# Patient Record
Sex: FEMALE | Race: WHITE
[De-identification: names, ages, dates, MRNs, and addresses within clinical notes are randomized per-mention and may not be internally consistent; named-entity substitution may affect disease eponyms.]

---

## 2021-01-06 ENCOUNTER — HOSPITAL ENCOUNTER (OUTPATIENT)
Dept: HOSPITAL 8 - CFH | Age: 31
Discharge: HOME | End: 2021-01-06
Payer: COMMERCIAL

## 2021-01-06 DIAGNOSIS — N20.0: Primary | ICD-10-CM

## 2021-01-06 DIAGNOSIS — N23: ICD-10-CM

## 2021-01-06 PROCEDURE — 74178 CT ABD&PLV WO CNTR FLWD CNTR: CPT

## 2022-01-05 ENCOUNTER — TELEPHONE (OUTPATIENT)
Dept: SCHEDULING | Facility: IMAGING CENTER | Age: 32
End: 2022-01-05

## 2022-02-07 ENCOUNTER — TELEPHONE (OUTPATIENT)
Dept: SCHEDULING | Facility: IMAGING CENTER | Age: 32
End: 2022-02-07

## 2023-07-19 ENCOUNTER — HOSPITAL ENCOUNTER (OUTPATIENT)
Dept: LAB | Facility: MEDICAL CENTER | Age: 33
End: 2023-07-19
Attending: UROLOGY
Payer: COMMERCIAL

## 2023-07-19 LAB
ALBUMIN SERPL BCP-MCNC: 4.2 G/DL (ref 3.2–4.9)
ALBUMIN/GLOB SERPL: 1.2 G/DL
ALP SERPL-CCNC: 98 U/L (ref 30–99)
ALT SERPL-CCNC: 22 U/L (ref 2–50)
ANION GAP SERPL CALC-SCNC: 12 MMOL/L (ref 7–16)
AST SERPL-CCNC: 22 U/L (ref 12–45)
BILIRUB SERPL-MCNC: 0.2 MG/DL (ref 0.1–1.5)
BUN SERPL-MCNC: 11 MG/DL (ref 8–22)
CALCIUM ALBUM COR SERPL-MCNC: 8.8 MG/DL (ref 8.5–10.5)
CALCIUM SERPL-MCNC: 9 MG/DL (ref 8.5–10.5)
CHLORIDE SERPL-SCNC: 104 MMOL/L (ref 96–112)
CO2 SERPL-SCNC: 22 MMOL/L (ref 20–33)
CREAT SERPL-MCNC: 0.67 MG/DL (ref 0.5–1.4)
GFR SERPLBLD CREATININE-BSD FMLA CKD-EPI: 118 ML/MIN/1.73 M 2
GLOBULIN SER CALC-MCNC: 3.6 G/DL (ref 1.9–3.5)
GLUCOSE SERPL-MCNC: 81 MG/DL (ref 65–99)
POTASSIUM SERPL-SCNC: 4.2 MMOL/L (ref 3.6–5.5)
PROT SERPL-MCNC: 7.8 G/DL (ref 6–8.2)
PTH-INTACT SERPL-MCNC: 47.3 PG/ML (ref 14–72)
SODIUM SERPL-SCNC: 138 MMOL/L (ref 135–145)
URATE SERPL-MCNC: 3.5 MG/DL (ref 1.9–8.2)

## 2023-07-19 PROCEDURE — 80053 COMPREHEN METABOLIC PANEL: CPT

## 2023-07-19 PROCEDURE — 36415 COLL VENOUS BLD VENIPUNCTURE: CPT

## 2023-07-19 PROCEDURE — 84550 ASSAY OF BLOOD/URIC ACID: CPT

## 2023-07-19 PROCEDURE — 83970 ASSAY OF PARATHORMONE: CPT

## 2023-08-17 ENCOUNTER — APPOINTMENT (OUTPATIENT)
Dept: URGENT CARE | Facility: CLINIC | Age: 33
End: 2023-08-17
Payer: COMMERCIAL

## 2023-08-18 ENCOUNTER — HOSPITAL ENCOUNTER (EMERGENCY)
Facility: MEDICAL CENTER | Age: 33
End: 2023-08-18
Attending: STUDENT IN AN ORGANIZED HEALTH CARE EDUCATION/TRAINING PROGRAM
Payer: COMMERCIAL

## 2023-08-18 VITALS
WEIGHT: 174.16 LBS | TEMPERATURE: 97.8 F | BODY MASS INDEX: 32.88 KG/M2 | HEART RATE: 85 BPM | SYSTOLIC BLOOD PRESSURE: 145 MMHG | DIASTOLIC BLOOD PRESSURE: 65 MMHG | RESPIRATION RATE: 18 BRPM | HEIGHT: 61 IN | OXYGEN SATURATION: 97 %

## 2023-08-18 DIAGNOSIS — R10.9 FLANK PAIN: ICD-10-CM

## 2023-08-18 LAB
ALBUMIN SERPL BCP-MCNC: 3.9 G/DL (ref 3.2–4.9)
ALBUMIN/GLOB SERPL: 1.2 G/DL
ALP SERPL-CCNC: 98 U/L (ref 30–99)
ALT SERPL-CCNC: 39 U/L (ref 2–50)
ANION GAP SERPL CALC-SCNC: 12 MMOL/L (ref 7–16)
APPEARANCE UR: CLEAR
AST SERPL-CCNC: 27 U/L (ref 12–45)
BACTERIA #/AREA URNS HPF: NEGATIVE /HPF
BASOPHILS # BLD AUTO: 0.5 % (ref 0–1.8)
BASOPHILS # BLD: 0.03 K/UL (ref 0–0.12)
BILIRUB SERPL-MCNC: 0.3 MG/DL (ref 0.1–1.5)
BILIRUB UR QL STRIP.AUTO: NEGATIVE
BUN SERPL-MCNC: 8 MG/DL (ref 8–22)
CALCIUM ALBUM COR SERPL-MCNC: 9.1 MG/DL (ref 8.5–10.5)
CALCIUM SERPL-MCNC: 9 MG/DL (ref 8.5–10.5)
CHLORIDE SERPL-SCNC: 105 MMOL/L (ref 96–112)
CO2 SERPL-SCNC: 19 MMOL/L (ref 20–33)
COLOR UR: YELLOW
CREAT SERPL-MCNC: 0.52 MG/DL (ref 0.5–1.4)
EOSINOPHIL # BLD AUTO: 0.15 K/UL (ref 0–0.51)
EOSINOPHIL NFR BLD: 2.6 % (ref 0–6.9)
EPI CELLS #/AREA URNS HPF: ABNORMAL /HPF
ERYTHROCYTE [DISTWIDTH] IN BLOOD BY AUTOMATED COUNT: 38.8 FL (ref 35.9–50)
GFR SERPLBLD CREATININE-BSD FMLA CKD-EPI: 126 ML/MIN/1.73 M 2
GLOBULIN SER CALC-MCNC: 3.3 G/DL (ref 1.9–3.5)
GLUCOSE SERPL-MCNC: 125 MG/DL (ref 65–99)
GLUCOSE UR STRIP.AUTO-MCNC: NEGATIVE MG/DL
HCG SERPL QL: NEGATIVE
HCT VFR BLD AUTO: 43.2 % (ref 37–47)
HGB BLD-MCNC: 14.4 G/DL (ref 12–16)
HYALINE CASTS #/AREA URNS LPF: ABNORMAL /LPF
IMM GRANULOCYTES # BLD AUTO: 0.01 K/UL (ref 0–0.11)
IMM GRANULOCYTES NFR BLD AUTO: 0.2 % (ref 0–0.9)
KETONES UR STRIP.AUTO-MCNC: 15 MG/DL
LEUKOCYTE ESTERASE UR QL STRIP.AUTO: ABNORMAL
LIPASE SERPL-CCNC: 30 U/L (ref 11–82)
LYMPHOCYTES # BLD AUTO: 1.63 K/UL (ref 1–4.8)
LYMPHOCYTES NFR BLD: 28.3 % (ref 22–41)
MCH RBC QN AUTO: 28.3 PG (ref 27–33)
MCHC RBC AUTO-ENTMCNC: 33.3 G/DL (ref 32.2–35.5)
MCV RBC AUTO: 85 FL (ref 81.4–97.8)
MICRO URNS: ABNORMAL
MONOCYTES # BLD AUTO: 0.63 K/UL (ref 0–0.85)
MONOCYTES NFR BLD AUTO: 10.9 % (ref 0–13.4)
NEUTROPHILS # BLD AUTO: 3.31 K/UL (ref 1.82–7.42)
NEUTROPHILS NFR BLD: 57.5 % (ref 44–72)
NITRITE UR QL STRIP.AUTO: NEGATIVE
NRBC # BLD AUTO: 0 K/UL
NRBC BLD-RTO: 0 /100 WBC (ref 0–0.2)
PH UR STRIP.AUTO: 5.5 [PH] (ref 5–8)
PLATELET # BLD AUTO: 283 K/UL (ref 164–446)
PMV BLD AUTO: 10 FL (ref 9–12.9)
POTASSIUM SERPL-SCNC: 3.8 MMOL/L (ref 3.6–5.5)
PROT SERPL-MCNC: 7.2 G/DL (ref 6–8.2)
PROT UR QL STRIP: NEGATIVE MG/DL
RBC # BLD AUTO: 5.08 M/UL (ref 4.2–5.4)
RBC # URNS HPF: ABNORMAL /HPF
RBC UR QL AUTO: ABNORMAL
SODIUM SERPL-SCNC: 136 MMOL/L (ref 135–145)
SP GR UR STRIP.AUTO: 1.01
UROBILINOGEN UR STRIP.AUTO-MCNC: 0.2 MG/DL
WBC # BLD AUTO: 5.8 K/UL (ref 4.8–10.8)
WBC #/AREA URNS HPF: ABNORMAL /HPF

## 2023-08-18 PROCEDURE — 80053 COMPREHEN METABOLIC PANEL: CPT

## 2023-08-18 PROCEDURE — 85025 COMPLETE CBC W/AUTO DIFF WBC: CPT

## 2023-08-18 PROCEDURE — 96374 THER/PROPH/DIAG INJ IV PUSH: CPT

## 2023-08-18 PROCEDURE — 84703 CHORIONIC GONADOTROPIN ASSAY: CPT

## 2023-08-18 PROCEDURE — 81001 URINALYSIS AUTO W/SCOPE: CPT

## 2023-08-18 PROCEDURE — 36415 COLL VENOUS BLD VENIPUNCTURE: CPT

## 2023-08-18 PROCEDURE — 700111 HCHG RX REV CODE 636 W/ 250 OVERRIDE (IP): Performed by: STUDENT IN AN ORGANIZED HEALTH CARE EDUCATION/TRAINING PROGRAM

## 2023-08-18 PROCEDURE — 83690 ASSAY OF LIPASE: CPT

## 2023-08-18 PROCEDURE — 99284 EMERGENCY DEPT VISIT MOD MDM: CPT

## 2023-08-18 PROCEDURE — 700105 HCHG RX REV CODE 258: Mod: JZ | Performed by: STUDENT IN AN ORGANIZED HEALTH CARE EDUCATION/TRAINING PROGRAM

## 2023-08-18 RX ORDER — SODIUM CHLORIDE, SODIUM LACTATE, POTASSIUM CHLORIDE, CALCIUM CHLORIDE 600; 310; 30; 20 MG/100ML; MG/100ML; MG/100ML; MG/100ML
1000 INJECTION, SOLUTION INTRAVENOUS ONCE
Status: COMPLETED | OUTPATIENT
Start: 2023-08-18 | End: 2023-08-18

## 2023-08-18 RX ORDER — IBUPROFEN 600 MG/1
600 TABLET ORAL EVERY 6 HOURS PRN
Qty: 30 TABLET | Refills: 0 | Status: SHIPPED
Start: 2023-08-18 | End: 2023-08-23

## 2023-08-18 RX ORDER — KETOROLAC TROMETHAMINE 30 MG/ML
15 INJECTION, SOLUTION INTRAMUSCULAR; INTRAVENOUS ONCE
Status: COMPLETED | OUTPATIENT
Start: 2023-08-18 | End: 2023-08-18

## 2023-08-18 RX ORDER — ONDANSETRON 4 MG/1
4 TABLET, ORALLY DISINTEGRATING ORAL EVERY 6 HOURS PRN
Qty: 10 TABLET | Refills: 0 | Status: SHIPPED | OUTPATIENT
Start: 2023-08-18 | End: 2023-08-23

## 2023-08-18 RX ORDER — TAMSULOSIN HYDROCHLORIDE 0.4 MG/1
0.4 CAPSULE ORAL DAILY
Qty: 14 CAPSULE | Refills: 0 | Status: SHIPPED | OUTPATIENT
Start: 2023-08-18 | End: 2023-08-30

## 2023-08-18 RX ADMIN — KETOROLAC TROMETHAMINE 15 MG: 30 INJECTION, SOLUTION INTRAMUSCULAR; INTRAVENOUS at 22:25

## 2023-08-18 RX ADMIN — SODIUM CHLORIDE, POTASSIUM CHLORIDE, SODIUM LACTATE AND CALCIUM CHLORIDE 1000 ML: 600; 310; 30; 20 INJECTION, SOLUTION INTRAVENOUS at 20:17

## 2023-08-19 NOTE — ED TRIAGE NOTES
"Chief Complaint   Patient presents with    Flank Pain     Started around 19:00. Pt has hx of kidney stones and had CT scan done  on Tuesday that showed 6 mm kidney stone. Pt feels like she may have passed it now.          Pt ambulated to triage for above complaint.  Pt is AO x 4, follows commands, and responds appropriately to questions. Patient's breathing is unlabored and pain is currently  0/10 on the 0-10 pain scale.  Pt placed in lobby. Patient educated on triage process and encouraged to alert staff for any changes.     BP (!) 172/104   Pulse 95   Temp 36.4 °C (97.5 °F) (Temporal)   Resp 16   Ht 1.549 m (5' 1\")   Wt 79 kg (174 lb 2.6 oz)   SpO2 97%     "

## 2023-08-19 NOTE — DISCHARGE INSTRUCTIONS
You were seen in the emergency department for flank pain.  Your labs show that your kidney function is normal.  Your urine is not consistent with urinary tract infection.  Please continue to use urine strainer to see if you pass kidney stone.  Please use Tylenol and Motrin at home to control your pain.  Please take Flomax to help with ureteral dilation to help you pass the stone.  Follow-up with your urologist for definitive management of the stone.  Return to the ER for uncontrolled pain, fever or any other concerns.  Follow-up with your doctor regarding routine health maintenance such as diabetes testing.

## 2023-08-19 NOTE — ED PROVIDER NOTES
ED Provider Note    CHIEF COMPLAINT  Chief Complaint   Patient presents with    Flank Pain     Started around 19:00. Pt has hx of kidney stones and had CT scan done  on Tuesday that showed 6 mm kidney stone. Pt feels like she may have passed it now.        EXTERNAL RECORDS REVIEWED  Other patient had CT scan of abdomen without contrast performed on 8/09/2023 which showed 5 mm calculus in the right kidney and 3 mm calculus in the left kidney with bilateral ureters and urinary bladder unremarkable    HPI/ROS  LIMITATION TO HISTORY   Select: : None  OUTSIDE HISTORIAN(S):  None    Deisy Gilbert is a 32 y.o. female with history of nephrolithiasis s/p ureteroscopy in 2017 who presents with right flank pain that started this evening.  States that pain was severe and sharp and feels like she was passing a kidney stone. Pain has now totally resolved spontaneously. She states that she had this pain earlier this week and was seen at outside ER where CT scan was done.  They were unable to get CT results but her urologist called her a few days ago and told her that she had a 6 mm calculus.  She is scheduled to see urology soon.  She took Tylenol for the pain last week but has not taken anything today.  She is not given any pain medications at ER visit earlier this week.  She also notes that she is getting over a stomach bug and states that the diarrhea that she previously had is getting better.  She is not vomiting.  She has no fever, abdominal pain, dysuria, hematuria.  She does feel some pain in the right flank when she tries to urinate but this is only present when she tries to urinate.  She otherwise has no chronic medical problems.    PAST MEDICAL HISTORY   She has history of nephrolithiasis    SURGICAL HISTORY  patient denies any surgical history    FAMILY HISTORY  History reviewed. No pertinent family history.    SOCIAL HISTORY  Social History     Tobacco Use    Smoking status: Never    Smokeless tobacco: Never  "  Substance and Sexual Activity    Alcohol use: Not Currently    Drug use: Not Currently    Sexual activity: Not on file       CURRENT MEDICATIONS  Home Medications       Reviewed by Kimberly Blancas R.N. (Registered Nurse) on 08/18/23 at 1931  Med List Status: Partial     Medication Last Dose Status        Patient Denny Taking any Medications                           ALLERGIES  No Known Allergies    PHYSICAL EXAM  VITAL SIGNS: BP (!) 172/104   Pulse 95   Temp 36.4 °C (97.5 °F) (Temporal)   Resp 16   Ht 1.549 m (5' 1\")   Wt 79 kg (174 lb 2.6 oz)   LMP 07/31/2023   SpO2 97%   BMI 32.91 kg/m²      Constitutional: Well developed, Well nourished, No acute distress, Non-toxic appearance.   HEENT: Normocephalic, Atraumatic,  external ears normal, pharynx pink,  Mucous  Membranes moist, No rhinorrhea or mucosal edema  Eyes: PERRL, EOMI, Conjunctiva normal, No discharge.   Neck: Normal range of motion, No tenderness, Supple, No stridor.   Cardiovascular: Regular Rate and Rhythm, No murmurs,  rubs, or gallops.   Thorax & Lungs: Lungs clear to auscultation bilaterally, No respiratory distress, No wheezes, rhales or rhonchi, No chest wall tenderness.   Abdomen: Bowel sounds normal, Soft, non tender, non distended,  No pulsatile masses., no rebound guarding or peritoneal signs.   Skin: Warm, Dry, No erythema, No rash,   Back:  No CVA tenderness,  No spinal tenderness, bony crepitance step offs or instability.   Extremities: Equal, intact distal pulses, No cyanosis, clubbing or edema,  No tenderness.   Musculoskeletal: Good range of motion in all major joints. No tenderness to palpation or major deformities noted.   Neurologic: Alert & oriented x 3,   No focal deficits noted.   Psychiatric: Affect normal, Judgment normal, Mood normal. No suicidal or homicidal ideation    DIAGNOSTIC STUDIES / PROCEDURES    LABS  Results for orders placed or performed during the hospital encounter of 08/18/23   CBC WITH DIFFERENTIAL "   Result Value Ref Range    WBC 5.8 4.8 - 10.8 K/uL    RBC 5.08 4.20 - 5.40 M/uL    Hemoglobin 14.4 12.0 - 16.0 g/dL    Hematocrit 43.2 37.0 - 47.0 %    MCV 85.0 81.4 - 97.8 fL    MCH 28.3 27.0 - 33.0 pg    MCHC 33.3 32.2 - 35.5 g/dL    RDW 38.8 35.9 - 50.0 fL    Platelet Count 283 164 - 446 K/uL    MPV 10.0 9.0 - 12.9 fL    Neutrophils-Polys 57.50 44.00 - 72.00 %    Lymphocytes 28.30 22.00 - 41.00 %    Monocytes 10.90 0.00 - 13.40 %    Eosinophils 2.60 0.00 - 6.90 %    Basophils 0.50 0.00 - 1.80 %    Immature Granulocytes 0.20 0.00 - 0.90 %    Nucleated RBC 0.00 0.00 - 0.20 /100 WBC    Neutrophils (Absolute) 3.31 1.82 - 7.42 K/uL    Lymphs (Absolute) 1.63 1.00 - 4.80 K/uL    Monos (Absolute) 0.63 0.00 - 0.85 K/uL    Eos (Absolute) 0.15 0.00 - 0.51 K/uL    Baso (Absolute) 0.03 0.00 - 0.12 K/uL    Immature Granulocytes (abs) 0.01 0.00 - 0.11 K/uL    NRBC (Absolute) 0.00 K/uL   COMP METABOLIC PANEL   Result Value Ref Range    Sodium 136 135 - 145 mmol/L    Potassium 3.8 3.6 - 5.5 mmol/L    Chloride 105 96 - 112 mmol/L    Co2 19 (L) 20 - 33 mmol/L    Anion Gap 12.0 7.0 - 16.0    Glucose 125 (H) 65 - 99 mg/dL    Bun 8 8 - 22 mg/dL    Creatinine 0.52 0.50 - 1.40 mg/dL    Calcium 9.0 8.5 - 10.5 mg/dL    Correct Calcium 9.1 8.5 - 10.5 mg/dL    AST(SGOT) 27 12 - 45 U/L    ALT(SGPT) 39 2 - 50 U/L    Alkaline Phosphatase 98 30 - 99 U/L    Total Bilirubin 0.3 0.1 - 1.5 mg/dL    Albumin 3.9 3.2 - 4.9 g/dL    Total Protein 7.2 6.0 - 8.2 g/dL    Globulin 3.3 1.9 - 3.5 g/dL    A-G Ratio 1.2 g/dL   LIPASE   Result Value Ref Range    Lipase 30 11 - 82 U/L   HCG QUAL SERUM   Result Value Ref Range    Beta-Hcg Qualitative Serum Negative Negative   URINALYSIS,CULTURE IF INDICATED    Specimen: Urine   Result Value Ref Range    Color Yellow     Character Clear     Specific Gravity 1.011 <1.035    Ph 5.5 5.0 - 8.0    Glucose Negative Negative mg/dL    Ketones 15 (A) Negative mg/dL    Protein Negative Negative mg/dL    Bilirubin Negative  Negative    Urobilinogen, Urine 0.2 Negative    Nitrite Negative Negative    Leukocyte Esterase Trace (A) Negative    Occult Blood Trace (A) Negative    Micro Urine Req Microscopic    URINE MICROSCOPIC (W/UA)   Result Value Ref Range    WBC 5-10 (A) /hpf    RBC 0-2 /hpf    Bacteria Negative None /hpf    Epithelial Cells Few /hpf    Hyaline Cast 0-2 /lpf   ESTIMATED GFR   Result Value Ref Range    GFR (CKD-EPI) 126 >60 mL/min/1.73 m 2     COURSE & MEDICAL DECISION MAKING    ED Observation Status? No; Patient does not meet criteria for ED Observation.     INITIAL ASSESSMENT, COURSE AND PLAN  Care Narrative: This is a 32-year-old female with history of nephrolithiasis who is presenting for evaluation of right flank pain that started this evening but has resolved since being in the emergency room spontaneously.  On arrival, her vital signs are stable without fever or tachycardia.  She is very well-appearing.  Her abdomen is soft and nontender on exam.  She has no flank tenderness on exam.    I reviewed her previous records and she recently had a CT scan done 3 days ago which showed that she had bilateral renal calculi.  She has been in touch with her urologist and is planning to have a follow-up appointment in procedure to address this further.  Labs are obtained and there is no leukocytosis.  Her hemoglobin is normal.  She is not pregnant.  Her bicarb was slightly low at 19 which may be reflective of mild dehydration therefore she was given an LR bolus.  Her kidney function is normal.  UA does show 5-10 white blood cells but is nitrite negative.  She has no dysuria, suprapubic pain, urinary urgency therefore I think urinary tract infection is unlikely.  A urine culture was sent.    I did consider obtaining repeat imaging of her abdomen but given that she just had a CT scan done 10 days ago and now no longer has pain, I do not think it is indicated at this time.  I discussed with the patient that the treatment for  kidney stones is pain control, urology follow-up and assessing for complications such as infected stone.  She states that she prefers to stick with Tylenol and Motrin for pain and does not want a prescription for any opiate medications.  I gave her a dose of Toradol here in case her pain were to return later this evening as she states that she does not have any Motrin at home right now.  I will provide her a prescription for Motrin as well as Zofran as needed.  Additionally provided her a course of Flomax to help her pass the stone.  She already has urology follow-up therefore will follow-up with them next week.  I discussed with the patient that she needs to return to the ER if she has uncontrolled pain, develop fever, persistent vomiting or any other concerns.  She already has urinary strainer and will use this to urinate through to see if she has passed a stone.  Patient has had no flank pain or any recurrence of pain since being in the emergency room.    HYDRATION: Based on the patient's presentation of Other mildly low bicarb the patient was given IV fluids. IV Hydration was used because oral hydration was not adequate alone. Upon recheck following hydration, the patient was improved with no tachycardia.        DISPOSITION AND DISCUSSIONS      Escalation of care considered, and ultimately not performed:diagnostic imaging however this was not done as the patient just had a CT scan done 10 days ago which demonstrated bilateral renal stones and she currently has no pain    Patient discharged home in stable condition with instructions to follow-up with urology.  Strict ER return precautions were discussed.  Patient is agreeable to discharge plan with no further questions.    FINAL DIAGNOSIS  1. Flank pain       Electronically signed by: Maite Joyner M.D., 8/18/2023 7:55 PM

## 2023-08-21 SDOH — ECONOMIC STABILITY: HOUSING INSECURITY
IN THE LAST 12 MONTHS, WAS THERE A TIME WHEN YOU DID NOT HAVE A STEADY PLACE TO SLEEP OR SLEPT IN A SHELTER (INCLUDING NOW)?: NO

## 2023-08-21 SDOH — ECONOMIC STABILITY: FOOD INSECURITY: WITHIN THE PAST 12 MONTHS, THE FOOD YOU BOUGHT JUST DIDN'T LAST AND YOU DIDN'T HAVE MONEY TO GET MORE.: NEVER TRUE

## 2023-08-21 SDOH — HEALTH STABILITY: PHYSICAL HEALTH: ON AVERAGE, HOW MANY MINUTES DO YOU ENGAGE IN EXERCISE AT THIS LEVEL?: 30 MIN

## 2023-08-21 SDOH — HEALTH STABILITY: PHYSICAL HEALTH: ON AVERAGE, HOW MANY DAYS PER WEEK DO YOU ENGAGE IN MODERATE TO STRENUOUS EXERCISE (LIKE A BRISK WALK)?: 3 DAYS

## 2023-08-21 SDOH — ECONOMIC STABILITY: FOOD INSECURITY: WITHIN THE PAST 12 MONTHS, YOU WORRIED THAT YOUR FOOD WOULD RUN OUT BEFORE YOU GOT MONEY TO BUY MORE.: NEVER TRUE

## 2023-08-21 SDOH — ECONOMIC STABILITY: HOUSING INSECURITY: IN THE LAST 12 MONTHS, HOW MANY PLACES HAVE YOU LIVED?: 1

## 2023-08-21 SDOH — ECONOMIC STABILITY: INCOME INSECURITY: HOW HARD IS IT FOR YOU TO PAY FOR THE VERY BASICS LIKE FOOD, HOUSING, MEDICAL CARE, AND HEATING?: NOT VERY HARD

## 2023-08-21 SDOH — ECONOMIC STABILITY: INCOME INSECURITY: IN THE LAST 12 MONTHS, WAS THERE A TIME WHEN YOU WERE NOT ABLE TO PAY THE MORTGAGE OR RENT ON TIME?: NO

## 2023-08-21 SDOH — ECONOMIC STABILITY: TRANSPORTATION INSECURITY
IN THE PAST 12 MONTHS, HAS THE LACK OF TRANSPORTATION KEPT YOU FROM MEDICAL APPOINTMENTS OR FROM GETTING MEDICATIONS?: NO

## 2023-08-21 SDOH — ECONOMIC STABILITY: TRANSPORTATION INSECURITY
IN THE PAST 12 MONTHS, HAS LACK OF RELIABLE TRANSPORTATION KEPT YOU FROM MEDICAL APPOINTMENTS, MEETINGS, WORK OR FROM GETTING THINGS NEEDED FOR DAILY LIVING?: NO

## 2023-08-21 SDOH — ECONOMIC STABILITY: TRANSPORTATION INSECURITY
IN THE PAST 12 MONTHS, HAS LACK OF TRANSPORTATION KEPT YOU FROM MEETINGS, WORK, OR FROM GETTING THINGS NEEDED FOR DAILY LIVING?: NO

## 2023-08-21 SDOH — HEALTH STABILITY: MENTAL HEALTH
STRESS IS WHEN SOMEONE FEELS TENSE, NERVOUS, ANXIOUS, OR CAN'T SLEEP AT NIGHT BECAUSE THEIR MIND IS TROUBLED. HOW STRESSED ARE YOU?: TO SOME EXTENT

## 2023-08-21 ASSESSMENT — SOCIAL DETERMINANTS OF HEALTH (SDOH)
HOW OFTEN DO YOU ATTEND CHURCH OR RELIGIOUS SERVICES?: NEVER
ARE YOU MARRIED, WIDOWED, DIVORCED, SEPARATED, NEVER MARRIED, OR LIVING WITH A PARTNER?: NEVER MARRIED
HOW OFTEN DO YOU HAVE A DRINK CONTAINING ALCOHOL: NEVER
HOW OFTEN DO YOU ATTEND CHURCH OR RELIGIOUS SERVICES?: NEVER
IN A TYPICAL WEEK, HOW MANY TIMES DO YOU TALK ON THE PHONE WITH FAMILY, FRIENDS, OR NEIGHBORS?: MORE THAN THREE TIMES A WEEK
WITHIN THE PAST 12 MONTHS, YOU WORRIED THAT YOUR FOOD WOULD RUN OUT BEFORE YOU GOT THE MONEY TO BUY MORE: NEVER TRUE
HOW OFTEN DO YOU ATTENT MEETINGS OF THE CLUB OR ORGANIZATION YOU BELONG TO?: NEVER
ARE YOU MARRIED, WIDOWED, DIVORCED, SEPARATED, NEVER MARRIED, OR LIVING WITH A PARTNER?: NEVER MARRIED
HOW MANY DRINKS CONTAINING ALCOHOL DO YOU HAVE ON A TYPICAL DAY WHEN YOU ARE DRINKING: PATIENT DOES NOT DRINK
HOW OFTEN DO YOU GET TOGETHER WITH FRIENDS OR RELATIVES?: PATIENT DECLINED
DO YOU BELONG TO ANY CLUBS OR ORGANIZATIONS SUCH AS CHURCH GROUPS UNIONS, FRATERNAL OR ATHLETIC GROUPS, OR SCHOOL GROUPS?: NO
HOW OFTEN DO YOU GET TOGETHER WITH FRIENDS OR RELATIVES?: PATIENT DECLINED
HOW OFTEN DO YOU HAVE SIX OR MORE DRINKS ON ONE OCCASION: NEVER
IN A TYPICAL WEEK, HOW MANY TIMES DO YOU TALK ON THE PHONE WITH FAMILY, FRIENDS, OR NEIGHBORS?: MORE THAN THREE TIMES A WEEK
HOW HARD IS IT FOR YOU TO PAY FOR THE VERY BASICS LIKE FOOD, HOUSING, MEDICAL CARE, AND HEATING?: NOT VERY HARD
DO YOU BELONG TO ANY CLUBS OR ORGANIZATIONS SUCH AS CHURCH GROUPS UNIONS, FRATERNAL OR ATHLETIC GROUPS, OR SCHOOL GROUPS?: NO
HOW OFTEN DO YOU ATTENT MEETINGS OF THE CLUB OR ORGANIZATION YOU BELONG TO?: NEVER

## 2023-08-21 ASSESSMENT — LIFESTYLE VARIABLES
AUDIT-C TOTAL SCORE: 0
HOW OFTEN DO YOU HAVE A DRINK CONTAINING ALCOHOL: NEVER
SKIP TO QUESTIONS 9-10: 1
HOW MANY STANDARD DRINKS CONTAINING ALCOHOL DO YOU HAVE ON A TYPICAL DAY: PATIENT DOES NOT DRINK
HOW OFTEN DO YOU HAVE SIX OR MORE DRINKS ON ONE OCCASION: NEVER

## 2023-08-22 ENCOUNTER — HOSPITAL ENCOUNTER (OUTPATIENT)
Dept: LAB | Facility: MEDICAL CENTER | Age: 33
End: 2023-08-22
Attending: STUDENT IN AN ORGANIZED HEALTH CARE EDUCATION/TRAINING PROGRAM
Payer: COMMERCIAL

## 2023-08-22 ENCOUNTER — HOSPITAL ENCOUNTER (OUTPATIENT)
Facility: MEDICAL CENTER | Age: 33
End: 2023-08-22
Attending: STUDENT IN AN ORGANIZED HEALTH CARE EDUCATION/TRAINING PROGRAM
Payer: COMMERCIAL

## 2023-08-22 ENCOUNTER — OFFICE VISIT (OUTPATIENT)
Dept: MEDICAL GROUP | Facility: MEDICAL CENTER | Age: 33
End: 2023-08-22
Payer: COMMERCIAL

## 2023-08-22 VITALS
RESPIRATION RATE: 20 BRPM | SYSTOLIC BLOOD PRESSURE: 110 MMHG | HEIGHT: 61 IN | DIASTOLIC BLOOD PRESSURE: 76 MMHG | BODY MASS INDEX: 32.38 KG/M2 | HEART RATE: 103 BPM | TEMPERATURE: 98.1 F | OXYGEN SATURATION: 97 % | WEIGHT: 171.52 LBS

## 2023-08-22 DIAGNOSIS — F33.0 MILD EPISODE OF RECURRENT MAJOR DEPRESSIVE DISORDER (HCC): ICD-10-CM

## 2023-08-22 DIAGNOSIS — R19.7 DIARRHEA, UNSPECIFIED TYPE: ICD-10-CM

## 2023-08-22 DIAGNOSIS — Z91.51: ICD-10-CM

## 2023-08-22 DIAGNOSIS — F33.1 MODERATE EPISODE OF RECURRENT MAJOR DEPRESSIVE DISORDER (HCC): ICD-10-CM

## 2023-08-22 DIAGNOSIS — N20.0 RECURRENT KIDNEY STONES: ICD-10-CM

## 2023-08-22 DIAGNOSIS — Z11.59 NEED FOR HEPATITIS C SCREENING TEST: ICD-10-CM

## 2023-08-22 DIAGNOSIS — F41.9 ANXIETY: ICD-10-CM

## 2023-08-22 LAB
ANION GAP SERPL CALC-SCNC: 12 MMOL/L (ref 7–16)
BUN SERPL-MCNC: 10 MG/DL (ref 8–22)
CALCIUM SERPL-MCNC: 9.3 MG/DL (ref 8.5–10.5)
CHLORIDE SERPL-SCNC: 105 MMOL/L (ref 96–112)
CO2 SERPL-SCNC: 21 MMOL/L (ref 20–33)
CREAT SERPL-MCNC: 0.56 MG/DL (ref 0.5–1.4)
CRP SERPL HS-MCNC: 1.11 MG/DL (ref 0–0.75)
ERYTHROCYTE [SEDIMENTATION RATE] IN BLOOD BY WESTERGREN METHOD: 8 MM/HOUR (ref 0–25)
GFR SERPLBLD CREATININE-BSD FMLA CKD-EPI: 124 ML/MIN/1.73 M 2
GLUCOSE SERPL-MCNC: 80 MG/DL (ref 65–99)
HCV AB SER QL: NORMAL
POTASSIUM SERPL-SCNC: 3.8 MMOL/L (ref 3.6–5.5)
SODIUM SERPL-SCNC: 138 MMOL/L (ref 135–145)
T4 FREE SERPL-MCNC: 5.2 NG/DL (ref 0.93–1.7)
TSH SERPL DL<=0.005 MIU/L-ACNC: <0.005 UIU/ML (ref 0.38–5.33)

## 2023-08-22 PROCEDURE — 3074F SYST BP LT 130 MM HG: CPT | Performed by: STUDENT IN AN ORGANIZED HEALTH CARE EDUCATION/TRAINING PROGRAM

## 2023-08-22 PROCEDURE — 83630 LACTOFERRIN FECAL (QUAL): CPT

## 2023-08-22 PROCEDURE — 86803 HEPATITIS C AB TEST: CPT

## 2023-08-22 PROCEDURE — 80048 BASIC METABOLIC PNL TOTAL CA: CPT

## 2023-08-22 PROCEDURE — 86140 C-REACTIVE PROTEIN: CPT

## 2023-08-22 PROCEDURE — 99204 OFFICE O/P NEW MOD 45 MIN: CPT | Performed by: STUDENT IN AN ORGANIZED HEALTH CARE EDUCATION/TRAINING PROGRAM

## 2023-08-22 PROCEDURE — 36415 COLL VENOUS BLD VENIPUNCTURE: CPT

## 2023-08-22 PROCEDURE — 87329 GIARDIA AG IA: CPT

## 2023-08-22 PROCEDURE — 84443 ASSAY THYROID STIM HORMONE: CPT

## 2023-08-22 PROCEDURE — 85652 RBC SED RATE AUTOMATED: CPT

## 2023-08-22 PROCEDURE — 3078F DIAST BP <80 MM HG: CPT | Performed by: STUDENT IN AN ORGANIZED HEALTH CARE EDUCATION/TRAINING PROGRAM

## 2023-08-22 PROCEDURE — 87328 CRYPTOSPORIDIUM AG IA: CPT

## 2023-08-22 PROCEDURE — 84439 ASSAY OF FREE THYROXINE: CPT

## 2023-08-22 RX ORDER — NORGESTREL AND ETHINYL ESTRADIOL 0.3-0.03MG
1 KIT ORAL
COMMUNITY
Start: 2023-07-23 | End: 2023-08-22

## 2023-08-22 RX ORDER — NORGESTREL AND ETHINYL ESTRADIOL 0.3-0.03MG
KIT ORAL
COMMUNITY
Start: 2014-07-01 | End: 2023-10-23 | Stop reason: SDUPTHER

## 2023-08-22 RX ORDER — POTASSIUM CITRATE 15 MEQ/1
TABLET, EXTENDED RELEASE ORAL
COMMUNITY
Start: 2023-07-24 | End: 2023-08-22

## 2023-08-22 RX ORDER — LOPERAMIDE HYDROCHLORIDE 2 MG/1
2 CAPSULE ORAL 4 TIMES DAILY PRN
COMMUNITY
End: 2023-08-30

## 2023-08-22 RX ORDER — POTASSIUM CITRATE 15 MEQ/1
TABLET, EXTENDED RELEASE ORAL
COMMUNITY
End: 2023-08-22

## 2023-08-22 RX ORDER — SERTRALINE HYDROCHLORIDE 25 MG/1
25 TABLET, FILM COATED ORAL DAILY
COMMUNITY
Start: 2022-09-17 | End: 2023-08-22

## 2023-08-22 RX ORDER — CYCLOBENZAPRINE HCL 5 MG
5 TABLET ORAL
COMMUNITY
End: 2023-08-22

## 2023-08-22 ASSESSMENT — FIBROSIS 4 INDEX: FIB4 SCORE: 0.49

## 2023-08-22 ASSESSMENT — ANXIETY QUESTIONNAIRES
4. TROUBLE RELAXING: MORE THAN HALF THE DAYS
5. BEING SO RESTLESS THAT IT IS HARD TO SIT STILL: NOT AT ALL
GAD7 TOTAL SCORE: 8
1. FEELING NERVOUS, ANXIOUS, OR ON EDGE: SEVERAL DAYS
2. NOT BEING ABLE TO STOP OR CONTROL WORRYING: MORE THAN HALF THE DAYS
6. BECOMING EASILY ANNOYED OR IRRITABLE: NOT AT ALL
3. WORRYING TOO MUCH ABOUT DIFFERENT THINGS: MORE THAN HALF THE DAYS
7. FEELING AFRAID AS IF SOMETHING AWFUL MIGHT HAPPEN: SEVERAL DAYS

## 2023-08-22 ASSESSMENT — PATIENT HEALTH QUESTIONNAIRE - PHQ9
5. POOR APPETITE OR OVEREATING: 2 - MORE THAN HALF THE DAYS
SUM OF ALL RESPONSES TO PHQ QUESTIONS 1-9: 11
CLINICAL INTERPRETATION OF PHQ2 SCORE: 3

## 2023-08-22 NOTE — LETTER
[facility]  Nacni Lucero M.D.  [address]  [place] [address]  Fax: [phone]   Authorization for Release/Disclosure of   Protected Health Information   Name: DEISY AVITIA : 1990 SSN: [id number]   Address: [facility] [address]  [place] [address] Phone:    [phone] (home)    I authorize the entity listed below to release/disclose the PHI below to:   [facility]/Nanci Lucero M.D. and Nanci Lucero M.D.   Provider or Entity Name: JOCELIN Del Rosario     Address   City, [facility]   Phone:   [phone]    Fax: [phone]     Reason for request: continuity of care   Information to be released:    [  ] LAST COLONOSCOPY,  including any PATH REPORT and follow-up  [  ] LAST FIT/COLOGUARD RESULT [  ] LAST DEXA  [  ] LAST MAMMOGRAM  [  ] LAST PAP  [  ] LAST LABS [  ] RETINA EXAM REPORT  [  ] IMMUNIZATION RECORDS  [ xxx ] Release all info      [  ] Check here and initial the line next to each item to release ALL health information INCLUDING  _____ Care and treatment for drug and / or alcohol abuse  _____ HIV testing, infection status, or AIDS  _____ Genetic Testing    DATES OF SERVICE OR TIME PERIOD TO BE DISCLOSED: _____________  I understand and acknowledge that:  * This Authorization may be revoked at any time by you in writing, except if your health information has already been used or disclosed.  * Your health information that will be used or disclosed as a result of you signing this authorization could be re-disclosed by the recipient. If this occurs, your re-disclosed health information may no longer be protected by State or Federal laws.  * You may refuse to sign this Authorization. Your refusal will not affect your ability to obtain treatment.  * This Authorization becomes effective upon signing and will  on (date) __________.      If no date is indicated, this Authorization will  one (1) year from the signature date.    Name: Deisy Avitia  Signature:  Date:   8/22/2023     PLEASE FAX REQUESTED RECORDS BACK TO: [phone]

## 2023-08-22 NOTE — PROGRESS NOTES
Subjective:     CC:  Diagnoses of Recurrent kidney stones, H/O: suicide attempt, Moderate episode of recurrent major depressive disorder (HCC), Diarrhea, unspecified type, Need for hepatitis C screening test, and Anxiety were pertinent to this visit.    HISTORY OF THE PRESENT ILLNESS: Patient is a 32 y.o. female. This pleasant patient is here today to establish care and discuss recent diarrhea and renal stones.     Problem   Diarrhea      Onset : 3 weeks back   Initially semi solid or liquid. 3=7 episodes/day no fever, chills at that time. Abdominal cramps present with some relief after defecation. Certain foods aggravated the symptoms so she has made some dietary changes - avoiding dairy and gluten . Took loperamide which helped. No h/o IBS or IBD. Denies blood in stool. May be some floating stools in toilet bowel but not sure.       Anxiety    MAR-7 Questionnaire    Feeling nervous, anxious, or on edge: Several days  Not being able to sop or control worrying: More than half the days  Worrying too much about different things: More than half the days  Trouble relaxing: More than half the days  Being so restless that it's hard to sit still: Not at all  Becoming easily annoyed or irritable: Not at all  Feeling afraid as if something awful might happen: Several days  Total: 8    Interpretation of MAR 7 Total Score   Score Severity :  0-4 No Anxiety   5-9 Mild Anxiety  10-14 Moderate Anxiety  15-21 Severe Anxiety         Recurrent Kidney Stones    H/o Recurrent renal stones:   Onset: 2 years back  Usually small stones that she usually pass with good hydration .  usually stones In right kidney.   Got established with Urology in July for recurrent stones.  Early Aug 2023 started having worsening flank pain   Got CT renal done 08/09/23 at [facility] showed b/l renal stone about 5-6 mm  Aug 18 ths symptoms worsen so was seen ER at [facility]--> received pain medications, Flomax. Patient states that after receiving Flomax and pain  med, felt better. Repeat CT at [facility] did not show a stone--> probably passed.Has f/u apt with urology --> if still has stones - plan to do lithotripsy.      H/O: Suicide Attempt    Patient with previous history of suicide attempts x 3  Reports that she was going to an acute stressful situation with break-up with her long-term boyfriend who she was planning to  and due to job changes.  The first and second attempt she was scared so she reported the plan(tried to hang herself but aborted). 3rd attempt was by taking all the pills she had when her mother found her and she was in hospital for a while. Previously with psychiatry and on medications but eventually once she started doing better, was tapered off the SSRI. Now following only with a psychologist .    NO current SI or HI thoughts.     Moderate Episode of Recurrent Major Depressive Disorder (Hcc)    Chronic, stable.Sometimes does feel little anxious but trying relaxation techniques.  Patient states she is doing well with psychotherapy Mariana  , usually remote visits.                   Health Maintenance: Completed    ROS:   Review of Systems   Constitutional:  Negative for fever and malaise/fatigue.   HENT:  Negative for congestion and sore throat.    Eyes:  Negative for blurred vision.   Respiratory:  Negative for cough, shortness of breath and wheezing.    Cardiovascular:  Negative for chest pain, palpitations and leg swelling.   Gastrointestinal:  Positive for diarrhea. Negative for blood in stool, heartburn and nausea.   Genitourinary:  Negative for dysuria and urgency.   Musculoskeletal:  Negative for falls and myalgias.   Neurological:  Negative for dizziness and headaches.   Psychiatric/Behavioral:  Negative for depression and suicidal ideas. The patient is nervous/anxious.          Objective:       Exam: /76 (BP Location: Left arm, Patient Position: Sitting, BP Cuff Size: Large adult long)   Pulse (!) 103   Temp 36.7 °C (98.1 °F)  "(Temporal)   Resp 20   Ht 1.549 m (5' 1\")   Wt 77.8 kg (171 lb 8.3 oz)   SpO2 97%  Body mass index is 32.41 kg/m².    Physical Exam  Constitutional:       Appearance: Normal appearance.   HENT:      Head: Normocephalic.   Eyes:      General: No scleral icterus.  Cardiovascular:      Rate and Rhythm: Normal rate and regular rhythm.      Pulses: Normal pulses.      Heart sounds: Normal heart sounds.   Pulmonary:      Effort: Pulmonary effort is normal.      Breath sounds: Normal breath sounds.   Musculoskeletal:      Right lower leg: No edema.      Left lower leg: No edema.   Skin:     General: Skin is warm.   Neurological:      Mental Status: She is alert and oriented to person, place, and time.   Psychiatric:         Mood and Affect: Mood normal.         Behavior: Behavior normal.           Assessment & Plan:   32 y.o. female with the following -    1. Recurrent kidney stones    Chronic, currently stable   Continue to follow up with urology   Flomax 0.4 mg once prn if recurrence    - Misc. Devices (STRAINER/STAINLESS STEEL/2.5\") Misc; 1 Each one time as needed (renal stones) for up to 1 dose.  Dispense: 1 Each; Refill: 3    2. H/O: suicide attempt - past   3. Moderate episode of recurrent major depressive disorder (HCC)  Depression Screening : Patient Health Questionnaire Score: 11   NO SI or HI .    Currently she is following up with psychologist. Recommended to Continue therapy   Extensive counseling provided and recommended referral to psychiatry . Importance of getting established with psychiatry for close follow up discussed with patient. She declined it at present.  Discussed starting a SSRI or SNRI at low dose, patient declined medications as well.  I will get few more labs including TSH levels as she is giving symptoms positive for intermittent episodes of feeling anxious. MAR score was 8.    - TSH WITH REFLEX TO FT4; Future    4. Diarrhea, unspecified type  Acute, improving   Unclear etiology , symptoms " for more than 3 weeks.  Plan:  Avoid dairy and gluten for few weeks.  Hydration, electrolytes supplements as needed  Ok to use loperamide 2 mg BID prn   We will get some labs and stool studies     - Sed Rate; Future  - CRP QUANTITIVE (NON-CARDIAC); Future  - Basic Metabolic Panel; Future  - FECAL LACTOFERRIN QUALITATIVE; Future    5. Need for hepatitis C screening test  - HEP C VIRUS ANTIBODY; Future      Return in about 4 weeks (around 9/19/2023) for labs f/u, chronic problems.    Please note that this dictation was created using voice recognition software. I have made every reasonable attempt to correct obvious errors, but I expect that there are errors of grammar and possibly content that I did not discover before finalizing the note.

## 2023-08-23 ENCOUNTER — HOSPITAL ENCOUNTER (OUTPATIENT)
Dept: LAB | Facility: MEDICAL CENTER | Age: 33
End: 2023-08-23
Attending: STUDENT IN AN ORGANIZED HEALTH CARE EDUCATION/TRAINING PROGRAM
Payer: COMMERCIAL

## 2023-08-23 ENCOUNTER — TELEMEDICINE (OUTPATIENT)
Dept: MEDICAL GROUP | Facility: MEDICAL CENTER | Age: 33
End: 2023-08-23
Payer: COMMERCIAL

## 2023-08-23 VITALS — WEIGHT: 171 LBS | HEIGHT: 61 IN | BODY MASS INDEX: 32.28 KG/M2

## 2023-08-23 DIAGNOSIS — R00.2 PALPITATIONS: ICD-10-CM

## 2023-08-23 DIAGNOSIS — R19.7 DIARRHEA, UNSPECIFIED TYPE: ICD-10-CM

## 2023-08-23 DIAGNOSIS — R79.89 ELEVATED SERUM FREE T4 LEVEL: ICD-10-CM

## 2023-08-23 DIAGNOSIS — F33.0 MILD EPISODE OF RECURRENT MAJOR DEPRESSIVE DISORDER (HCC): ICD-10-CM

## 2023-08-23 DIAGNOSIS — Z91.51: ICD-10-CM

## 2023-08-23 PROBLEM — F41.9 ANXIETY: Status: ACTIVE | Noted: 2023-08-23

## 2023-08-23 PROBLEM — F33.1 MODERATE EPISODE OF RECURRENT MAJOR DEPRESSIVE DISORDER (HCC): Status: ACTIVE | Noted: 2022-08-23

## 2023-08-23 LAB
G LAMBLIA+C PARVUM AG STL QL RAPID: NORMAL
LACTOFERRIN STL QL IA: NEGATIVE
SIGNIFICANT IND 70042: NORMAL
SITE SITE: NORMAL
SOURCE SOURCE: NORMAL
T3FREE SERPL-MCNC: 19.9 PG/ML (ref 2–4.4)
T4 FREE SERPL-MCNC: 5.87 NG/DL (ref 0.93–1.7)
TSH SERPL DL<=0.005 MIU/L-ACNC: <0.005 UIU/ML (ref 0.38–5.33)

## 2023-08-23 PROCEDURE — 84439 ASSAY OF FREE THYROXINE: CPT

## 2023-08-23 PROCEDURE — 99214 OFFICE O/P EST MOD 30 MIN: CPT | Mod: 95 | Performed by: STUDENT IN AN ORGANIZED HEALTH CARE EDUCATION/TRAINING PROGRAM

## 2023-08-23 PROCEDURE — 84481 FREE ASSAY (FT-3): CPT

## 2023-08-23 PROCEDURE — 83520 IMMUNOASSAY QUANT NOS NONAB: CPT

## 2023-08-23 PROCEDURE — 84443 ASSAY THYROID STIM HORMONE: CPT

## 2023-08-23 PROCEDURE — 36415 COLL VENOUS BLD VENIPUNCTURE: CPT

## 2023-08-23 RX ORDER — PROPRANOLOL HYDROCHLORIDE 10 MG/1
10 TABLET ORAL 3 TIMES DAILY
Qty: 30 TABLET | Refills: 0 | Status: SHIPPED | OUTPATIENT
Start: 2023-08-23 | End: 2023-08-30 | Stop reason: SDUPTHER

## 2023-08-23 ASSESSMENT — ENCOUNTER SYMPTOMS
NERVOUS/ANXIOUS: 1
PALPITATIONS: 0
NAUSEA: 0
DEPRESSION: 0
SORE THROAT: 0
HEADACHES: 0
SHORTNESS OF BREATH: 0
WHEEZING: 0
DIARRHEA: 1
DIZZINESS: 0
COUGH: 0
BLURRED VISION: 0
FALLS: 0
BLOOD IN STOOL: 0
FEVER: 0
MYALGIAS: 0
HEARTBURN: 0

## 2023-08-23 ASSESSMENT — FIBROSIS 4 INDEX: FIB4 SCORE: 0.49

## 2023-08-24 ENCOUNTER — PATIENT MESSAGE (OUTPATIENT)
Dept: MEDICAL GROUP | Facility: MEDICAL CENTER | Age: 33
End: 2023-08-24
Payer: COMMERCIAL

## 2023-08-24 DIAGNOSIS — E05.90 HYPERTHYROIDISM: ICD-10-CM

## 2023-08-24 DIAGNOSIS — F41.9 ANXIETY: ICD-10-CM

## 2023-08-25 LAB — TSH RECEP AB SER-ACNC: 9.72 IU/L

## 2023-08-25 NOTE — PROGRESS NOTES
Virtual Visit: Established Patient   This visit was conducted via Zoom using secure and encrypted videoconferencing technology.   The patient was in their home in the state of [place].    The patient's identity was confirmed and verbal consent was obtained for this virtual visit.    Subjective:   CC:   Chief Complaint   Patient presents with    Palpitations     Shaking uncontrollably, sweating profusely, rapid heart rate since last night       Lab Results     Follow up, has questions about the fecal test     Deisy Gilbert is a 32 y.o. female presenting for evaluation episode of anxiety with shaking palpitations profuse sweating.  Patient states that she has history of anxiety and sometimes she gets anxious this morning she had all these anxiety symptoms.  Unaware why they started no acute reason.  No triggering factors.  During the visit she was feeling little better compared to the morning when she woke up with the symptoms.    I reviewed her lab results her thyroid numbers are off.  Her free T4 is elevated with very low TSH levels.  TSH of less than 0.005 Free T4 of 5.87 .  Patient most likely has hyperthyroidism.  Based her chart review and as per history she does not have any previous history of thyroid problems.  Other lab results not concerning  ROS     Denies nausea, vomiting, abdominal pain, skin changes, blurred vision, headache, weakness or sensory changes.  No suicidal or homicidal ideations.  Review of system positive for ongoing diarrhea for 1 month and anxiety symptoms    Please see HPI for additional ROS.      Current medicines (including changes today)  Current Outpatient Medications   Medication Sig Dispense Refill    propranolol (INDERAL) 10 MG Tab Take 1 Tablet by mouth 3 times a day. 30 Tablet 0    norgestrel-ethinyl estradiol (LOW-OGESTREL) 0.3-30 MG-MCG Tab       loperamide (IMODIUM A-D) 2 MG Cap Take 2 mg by mouth 4 times a day as needed for Diarrhea.      Misc. Devices (STRAINER/STAINLESS  "STEEL/2.5\") Misc 1 Each one time as needed (renal stones) for up to 1 dose. 1 Each 3    Probiotic Product (PRO-BIOTIC BLEND PO) Take  by mouth.      tamsulosin (FLOMAX) 0.4 MG capsule Take 1 Capsule by mouth every day. (Patient not taking: Reported on 8/22/2023) 14 Capsule 0     No current facility-administered medications for this visit.       Patient Active Problem List    Diagnosis Date Noted    Diarrhea 08/23/2023    Anxiety 08/23/2023    Recurrent kidney stones 08/22/2023    H/O: suicide attempt 08/22/2023    Moderate episode of recurrent major depressive disorder (HCC) 08/23/2022        Objective:   Ht 1.549 m (5' 1\") Comment: Pt reported  Wt 77.6 kg (171 lb) Comment: Pt reported  LMP 08/08/2023   BMI 32.31 kg/m²     Physical Exam:  Constitutional: Alert, no distress, well-groomed.  Skin: No rashes in visible areas.  Eye: Round. Conjunctiva clear, lids normal. No icterus.   ENMT: Lips pink without lesions, good dentition, moist mucous membranes. Phonation normal.  Neck: No masses, no thyromegaly. Moves freely without pain.  Respiratory: Unlabored respiratory effort, no cough or audible wheeze  Psych: Alert and oriented x3, normal affect and mood.     Assessment and Plan:   The following treatment plan was discussed:     1. Elevated serum free T4 level    Patient with elevated free T4 and low TSH levels.  Most likely hyperthyroidism.  Given she never had a history I want to repeat the labs to make sure it is not a lab error.  I will also get TSH receptor antibodies test.  For symptomatic treatment I am going to start her on beta-blocker.  If confirmed hyperthyroidism then we will discuss about starting methimazole and sending referral to endocrinology    - propranolol (INDERAL) 10 MG Tab; Take 1 Tablet by mouth 3 times a day.  Dispense: 30 Tablet; Refill: 0  - T3 FREE; Future  - TSH RECEPTOR ANTIBODY; Future  - TSH WITH REFLEX TO FT4; Future    2. Palpitations  Most likely due to anxiety vs symptoms of " hyperthyroidism  Plan  I will start patient on propranolol 10 mg 3 times daily for symptomatic treatment.  If repeat thyroid labs comes back positive we will start patient on possibly methimazole and send referral to endocrinology    - propranolol (INDERAL) 10 MG Tab; Take 1 Tablet by mouth 3 times a day.  Dispense: 30 Tablet; Refill: 0  - T3 FREE; Future  - TSH RECEPTOR ANTIBODY; Future  - TSH WITH REFLEX TO FT4; Future        Follow-up: No follow-ups on file.

## 2023-08-30 ENCOUNTER — OFFICE VISIT (OUTPATIENT)
Dept: MEDICAL GROUP | Facility: MEDICAL CENTER | Age: 33
End: 2023-08-30
Payer: COMMERCIAL

## 2023-08-30 VITALS
TEMPERATURE: 98.1 F | WEIGHT: 170.4 LBS | DIASTOLIC BLOOD PRESSURE: 86 MMHG | RESPIRATION RATE: 18 BRPM | BODY MASS INDEX: 32.17 KG/M2 | HEART RATE: 99 BPM | HEIGHT: 61 IN | SYSTOLIC BLOOD PRESSURE: 120 MMHG | OXYGEN SATURATION: 98 %

## 2023-08-30 DIAGNOSIS — E05.90 HYPERTHYROIDISM: ICD-10-CM

## 2023-08-30 DIAGNOSIS — E05.00 GRAVES DISEASE: ICD-10-CM

## 2023-08-30 DIAGNOSIS — R00.2 PALPITATIONS: ICD-10-CM

## 2023-08-30 DIAGNOSIS — F41.9 ANXIETY: ICD-10-CM

## 2023-08-30 PROCEDURE — 99214 OFFICE O/P EST MOD 30 MIN: CPT | Performed by: STUDENT IN AN ORGANIZED HEALTH CARE EDUCATION/TRAINING PROGRAM

## 2023-08-30 PROCEDURE — 3079F DIAST BP 80-89 MM HG: CPT | Performed by: STUDENT IN AN ORGANIZED HEALTH CARE EDUCATION/TRAINING PROGRAM

## 2023-08-30 PROCEDURE — 3074F SYST BP LT 130 MM HG: CPT | Performed by: STUDENT IN AN ORGANIZED HEALTH CARE EDUCATION/TRAINING PROGRAM

## 2023-08-30 RX ORDER — METHIMAZOLE 10 MG/1
10 TABLET ORAL 3 TIMES DAILY
Qty: 90 TABLET | Refills: 0 | Status: SHIPPED | OUTPATIENT
Start: 2023-08-30 | End: 2023-09-11

## 2023-08-30 RX ORDER — POTASSIUM CITRATE 15 MEQ/1
TABLET, EXTENDED RELEASE ORAL
COMMUNITY
Start: 2023-08-24

## 2023-08-30 RX ORDER — POTASSIUM CITRATE 15 MEQ/1
TABLET, EXTENDED RELEASE ORAL
COMMUNITY
Start: 2023-08-24 | End: 2023-08-30

## 2023-08-30 RX ORDER — PROPRANOLOL HYDROCHLORIDE 10 MG/1
10 TABLET ORAL 3 TIMES DAILY
Qty: 30 TABLET | Refills: 0 | Status: SHIPPED | OUTPATIENT
Start: 2023-08-30 | End: 2023-09-11

## 2023-08-30 RX ORDER — METHIMAZOLE 5 MG/1
5 TABLET ORAL 3 TIMES DAILY
Qty: 90 TABLET | Refills: 0 | Status: SHIPPED | OUTPATIENT
Start: 2023-08-30 | End: 2023-08-30

## 2023-08-30 ASSESSMENT — FIBROSIS 4 INDEX: FIB4 SCORE: 0.49

## 2023-08-30 NOTE — PROGRESS NOTES
"Subjective:     CC: Follow-up for palpitations and hypothyroidism    HPI:   Deisy presents today to follow-up on her recently diagnosed hyperthyroidism based on her thyroid lab results.  Patient had recent thyroid labs done which showed very low TSH and high free T4 of 5.8.  I requested her to repeat the labs along with thyroglobulin antibodies to confirm.  Her repeat levels came back positive TSH of less than 0.005, free T4 of 5.8 and free T3 of 19.9.  Thyrotropin receptor antibodies high 9.72.  Patient likely has Graves' disease.    Review of system positive for increased heat sensitivity feeling hot all the time, anxiety, intermittent diarrhea, generalized fatigue and joint pain.  Patient reports symptoms of anxiety and palpitations has improved after she was started on propranolol.  She is doing little better.     Endocrinology referrals placed last visit patient reports she has an upcoming appointment next week with [facility] endocrinology.      Health Maintenance: Completed    ROS:  ROS    Review of systems unremarkable except for concerns noted by patient or items listed.    Please see HPI for additional ROS.      Objective:     Exam:  /86 (BP Location: Left arm, Patient Position: Sitting, BP Cuff Size: Large adult long)   Pulse 99   Temp 36.7 °C (98.1 °F) (Temporal)   Resp 18   Ht 1.549 m (5' 0.98\")   Wt 77.3 kg (170 lb 6.4 oz)   LMP 08/08/2023   SpO2 98%   BMI 32.21 kg/m²  Body mass index is 32.21 kg/m².    Physical Exam  Constitutional:       Appearance: Normal appearance.      Comments: Patient appears slightly diaphoretic , cheeks red in color   HENT:      Head: Normocephalic.   Eyes:      General: No scleral icterus.  Neck:      Comments: No thyroid nodules palpable on thyroid examination.  Unclear if enlarged  Cardiovascular:      Rate and Rhythm: Normal rate and regular rhythm.      Pulses: Normal pulses.      Heart sounds: Normal heart sounds.   Pulmonary:      Effort: Pulmonary effort " is normal.      Breath sounds: Normal breath sounds.   Musculoskeletal:      Right lower leg: No edema.      Left lower leg: No edema.   Skin:     General: Skin is warm.   Neurological:      Mental Status: She is alert and oriented to person, place, and time.   Psychiatric:         Mood and Affect: Mood normal.         Behavior: Behavior normal.             Labs: Reviewed    Assessment & Plan:     32 y.o. female with the following -     1. Hyperthyroidism  2. Graves disease    Patient with history of anxiety, depression who recently was seen for palpitations and headache.  Labs showed elevated free T4 T3 and thyroglobulin antibodies and very low TSH levels.  Patient most likely had hyperthyroidism due to Graves' disease    Plan    Continue propranolol 10 mg 3 times daily for symptomatic relief.  Start methimazole 10 mg 3 times daily.  This dose needs to be titrated eventually according to her symptoms and follow-up thyroid labs in next 6 to 8 weeks.  Discussed possible side effects of methimazole.  Patient verbalized understanding   Referral placed.Follow-up with endocrinology.   Continue plan to- methimazole (TAPAZOLE) 10 MG Tab; Take 1 Tablet by mouth 3 times a day.  Dispense: 90 Tablet; Refill: 0        3. Anxiety  4. Palpitations    Was likely due to hyperthyroidism and Graves' disease.  Patient reports improvement in her symptoms since she started taking beta-blocker  Plan  Continue propranolol 10 mg 3 times daily  - propranolol (INDERAL) 10 MG Tab; Take 1 Tablet by mouth 3 times a day.  Dispense: 30 Tablet; Refill: 0        Return in about 3 months (around 11/30/2023) for chronic problems.    Please note that this dictation was created using voice recognition software. I have made every reasonable attempt to correct obvious errors, but I expect that there are errors of grammar and possibly content that I did not discover before finalizing the note.

## 2023-09-07 ENCOUNTER — OFFICE VISIT (OUTPATIENT)
Dept: ENDOCRINOLOGY | Facility: MEDICAL CENTER | Age: 33
End: 2023-09-07
Attending: STUDENT IN AN ORGANIZED HEALTH CARE EDUCATION/TRAINING PROGRAM
Payer: COMMERCIAL

## 2023-09-07 ENCOUNTER — PATIENT MESSAGE (OUTPATIENT)
Dept: ENDOCRINOLOGY | Facility: MEDICAL CENTER | Age: 33
End: 2023-09-07

## 2023-09-07 VITALS
DIASTOLIC BLOOD PRESSURE: 70 MMHG | SYSTOLIC BLOOD PRESSURE: 102 MMHG | BODY MASS INDEX: 31.91 KG/M2 | HEIGHT: 61 IN | OXYGEN SATURATION: 97 % | HEART RATE: 88 BPM | WEIGHT: 169 LBS

## 2023-09-07 DIAGNOSIS — E05.00 GRAVES DISEASE: ICD-10-CM

## 2023-09-07 PROCEDURE — 99211 OFF/OP EST MAY X REQ PHY/QHP: CPT | Performed by: STUDENT IN AN ORGANIZED HEALTH CARE EDUCATION/TRAINING PROGRAM

## 2023-09-07 PROCEDURE — 99205 OFFICE O/P NEW HI 60 MIN: CPT | Performed by: STUDENT IN AN ORGANIZED HEALTH CARE EDUCATION/TRAINING PROGRAM

## 2023-09-07 PROCEDURE — 3078F DIAST BP <80 MM HG: CPT | Performed by: STUDENT IN AN ORGANIZED HEALTH CARE EDUCATION/TRAINING PROGRAM

## 2023-09-07 PROCEDURE — 3074F SYST BP LT 130 MM HG: CPT | Performed by: STUDENT IN AN ORGANIZED HEALTH CARE EDUCATION/TRAINING PROGRAM

## 2023-09-07 ASSESSMENT — FIBROSIS 4 INDEX: FIB4 SCORE: 0.49

## 2023-09-07 NOTE — PATIENT INSTRUCTIONS
Methimazole 10 mg three times a day.   Propranolol 10 mg three times a day, if heart rate at 60s or below -> decrease dose to 1/2 tab three times a day, if still at 60s -> 1/2 tab twice a day -> if still low heart rate -> stop it  Repeat thyroid labs in 4 weeks.   Let  me if you develop hypothyroid symptoms (low thyroid hormone): weight gain, day time sleepiness, cold intolerance, constipation.  Thyroid US.

## 2023-09-07 NOTE — PROGRESS NOTES
New Patient Consult Note for Endocrinology  Referred by: Nanci Lucero M.D.    Reason for consult:   Hyperthyroidism    HPI:  Deisy Gilbert is a very pleasant 32 y.o. lady, who was referred to endocrinology service for Graves disease evaluation and treatment. Here with her mom - Marcy.     Hyperthyroidism:  - was feeling tired for many months  - she lost 11 lb but associated with recent loss of her friend  - since beginning of 8/2023 patient noted nausea, diarrhea (having up to 4 BMS/day, watery stools), later on she noted palpitations  - she also noted worsening of her anxiety, reports  insomnia, sweating/hot flashes, heaviness on her chest, hand tremors, muscle weakness to the point that she needs to use cane to walk - persistent  - on labs from 8/23/23 - biochemical evidence of hyperthyroidism + elevated TrAbs  - was started on MMI 30 mg/day + propranolol 10 mg TID - no improvement of symptoms so far  - nonsmoker  - Fhx - thyroid disease in her aunt, not sure about the diagnosis    Past Medical History:  Patient Active Problem List    Diagnosis Date Noted    Palpitations 08/30/2023    Graves disease 08/30/2023    Hyperthyroidism 08/30/2023    Diarrhea 08/23/2023    Anxiety 08/23/2023    Recurrent kidney stones 08/22/2023    H/O: suicide attempt 08/22/2023    Moderate episode of recurrent major depressive disorder (HCC) 08/23/2022       Past Surgical History:  No past surgical history on file.    Allergies:  Lac bovis and Lactose    Social History:  Social History     Socioeconomic History    Marital status: Single     Spouse name: Not on file    Number of children: Not on file    Years of education: Not on file    Highest education level: Bachelor's degree (e.g., BA, AB, BS)   Occupational History    Not on file   Tobacco Use    Smoking status: Never    Smokeless tobacco: Never   Vaping Use    Vaping Use: Never used   Substance and Sexual Activity    Alcohol use: Not Currently    Drug use: Not Currently     Sexual activity: Not Currently     Birth control/protection: Pill   Other Topics Concern    Not on file   Social History Narrative    Not on file     Social Determinants of Health     Financial Resource Strain: Low Risk  (8/21/2023)    Overall Financial Resource Strain (CARDIA)     Difficulty of Paying Living Expenses: Not very hard   Food Insecurity: No Food Insecurity (8/21/2023)    Hunger Vital Sign     Worried About Running Out of Food in the Last Year: Never true     Ran Out of Food in the Last Year: Never true   Transportation Needs: No Transportation Needs (8/21/2023)    PRAPARE - Transportation     Lack of Transportation (Medical): No     Lack of Transportation (Non-Medical): No   Physical Activity: Insufficiently Active (8/21/2023)    Exercise Vital Sign     Days of Exercise per Week: 3 days     Minutes of Exercise per Session: 30 min   Stress: Stress Concern Present (8/21/2023)    [employer] [employer] - Occupational Stress Questionnaire     Feeling of Stress : To some extent   Social Connections: Socially Isolated (8/21/2023)    Social Connection and Isolation Panel [NHANES]     Frequency of Communication with Friends and Family: More than three times a week     Frequency of Social Gatherings with Friends and Family: Patient refused     Attends Taoism Services: Never     Active Member of Clubs or Organizations: No     Attends Club or Organization Meetings: Never     Marital Status: Never    Intimate Partner Violence: Not on file   Housing Stability: Low Risk  (8/21/2023)    Housing Stability Vital Sign     Unable to Pay for Housing in the Last Year: No     Number of Places Lived in the Last Year: 1     Unstable Housing in the Last Year: No       Family History:  Family History   Problem Relation Age of Onset    No Known Problems Mother     Eczema Father     No Known Problems Brother        Medications:    Current Outpatient Medications:     Potassium Citrate 15 MEQ (1620 MG)  "Tab CR, , Disp: , Rfl:     propranolol (INDERAL) 10 MG Tab, Take 1 Tablet by mouth 3 times a day., Disp: 30 Tablet, Rfl: 0    methimazole (TAPAZOLE) 10 MG Tab, Take 1 Tablet by mouth 3 times a day., Disp: 90 Tablet, Rfl: 0    norgestrel-ethinyl estradiol (LOW-OGESTREL) 0.3-30 MG-MCG Tab, , Disp: , Rfl:     Probiotic Product (PRO-BIOTIC BLEND PO), Take  by mouth., Disp: , Rfl:     Physical Examination:   Vital signs: /70 (BP Location: Right arm, Patient Position: Sitting, BP Cuff Size: Adult)   Pulse 88   Ht 1.549 m (5' 1\")   Wt 76.7 kg (169 lb)   LMP 09/03/2023   SpO2 97%   BMI 31.93 kg/m²   General: No distress, cooperative, well dressed and well nourished.   Eyes: No scleral icterus or discharge. No proptosis, no ptosis, no chemosis, no erythema, double vision with down gaze  ENMT: Normal on external inspection of nose, lips, No nasal drainage   Neck: No abnormal masses on inspection. Thyroid gland is mildly enlarged.   Resp: Normal effort.  CVS: Regular rate and rhythm.  Extremities: No edema bilateral extremities  Neuro: Alert and oriented.   Skin: No rash, No Ulcers  Psych: Normal mood and affect    Labs:  - reviewed      Imaging:  - reviewed    Assessment and Plan:  1. 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 - periodic hypokalemic paralysis and thyrotoxic periodic  paralysis are unlikely given persistent nature of muscle weakness vs myopathic attacks, already on propranolol - treatment for thyrotoxic periodic paralysis  - appropriately started on MMI and nonselective beta-blocker  - we discussed cause and natural history of the Graves disease, treatment approaches  - I explained that it takes up to 3-4 weeks to " reach euthyroidism    Plan:  Continue MMI 30 mg/day  Continue propranolol 10 mg TID, given instruction how to taper off the dose of the medication based on HR if needed.   Consider ophthalmologist consult if eye symptoms persist.   Repeat TFTS in 3 weeks, obtain thyroid US.     - TRIIDOTHYRONINE; Future  - FREE THYROXINE; Future  - TSH; Future  - US-THYROID; Future    RTC: 3 -4 weeks    Total time (face-to-face and non-face-to face time):  60 min - extensive discussion about Graves disease natural course, treatment approaches, medical charts, lab review, documentation.    Plan reviewed with the patient and agreed with plan.  All questions answered to patient's satisfaction.  Thank you kindly for allowing me to participate in the care plan for this patient.    Kamilah Danielson MD    CC:   Nanci Lucero M.D.

## 2023-09-10 ENCOUNTER — PATIENT MESSAGE (OUTPATIENT)
Dept: MEDICAL GROUP | Facility: MEDICAL CENTER | Age: 33
End: 2023-09-10
Payer: COMMERCIAL

## 2023-09-10 DIAGNOSIS — R19.7 DIARRHEA, UNSPECIFIED TYPE: ICD-10-CM

## 2023-09-11 ENCOUNTER — TELEPHONE (OUTPATIENT)
Dept: ENDOCRINOLOGY | Facility: MEDICAL CENTER | Age: 33
End: 2023-09-11
Payer: COMMERCIAL

## 2023-09-11 DIAGNOSIS — E05.90 HYPERTHYROIDISM: ICD-10-CM

## 2023-09-11 DIAGNOSIS — E05.00 GRAVES DISEASE: ICD-10-CM

## 2023-09-11 DIAGNOSIS — R00.2 PALPITATIONS: ICD-10-CM

## 2023-09-11 RX ORDER — METHIMAZOLE 10 MG/1
10 TABLET ORAL 3 TIMES DAILY
Qty: 90 TABLET | Refills: 0 | Status: SHIPPED | OUTPATIENT
Start: 2023-09-11 | End: 2023-09-12 | Stop reason: SDUPTHER

## 2023-09-11 RX ORDER — PROPRANOLOL HYDROCHLORIDE 10 MG/1
10 TABLET ORAL 3 TIMES DAILY
Qty: 30 TABLET | Refills: 0 | Status: SHIPPED | OUTPATIENT
Start: 2023-09-11 | End: 2023-09-12 | Stop reason: SDUPTHER

## 2023-09-11 NOTE — TELEPHONE ENCOUNTER
Patient stopped by because her primary refilled propranolol 10 mg for only 10 days, however she is down to her last day today. She was only given enough to until she saw the specialist.     She also requested a refill on Methimazole 10 mg. Sent to [facility] on [address]

## 2023-09-11 NOTE — TELEPHONE ENCOUNTER
Received request via: Pharmacy    Was the patient seen in the last year in this department? Yes    Does the patient have an active prescription (recently filled or refills available) for medication(s) requested? No    Does the patient have snf Plus and need 100 day supply (blood pressure, diabetes and cholesterol meds only)? Patient does not have SCP    Future Appointments         Provider Department [place]    9/22/2023 10:20 AM (Arrive by 10:05 AM) Nanci Lucero M.D. [facility] Medical [facility]    10/2/2023 11:00 AM (Arrive by 10:45 AM) [place]  [facility]    10/10/2023 9:00 AM Kamilah Danielson M.D. [facility]

## 2023-09-12 DIAGNOSIS — E05.00 GRAVES DISEASE: ICD-10-CM

## 2023-09-12 DIAGNOSIS — E05.90 HYPERTHYROIDISM: ICD-10-CM

## 2023-09-12 DIAGNOSIS — R00.2 PALPITATIONS: ICD-10-CM

## 2023-09-12 RX ORDER — METHIMAZOLE 10 MG/1
10 TABLET ORAL 3 TIMES DAILY
Qty: 90 TABLET | Refills: 0 | Status: SHIPPED | OUTPATIENT
Start: 2023-09-12 | End: 2023-09-18 | Stop reason: SDUPTHER

## 2023-09-12 RX ORDER — PROPRANOLOL HYDROCHLORIDE 10 MG/1
10 TABLET ORAL 3 TIMES DAILY
Qty: 270 TABLET | Refills: 0 | Status: SHIPPED | OUTPATIENT
Start: 2023-09-12 | End: 2023-09-18 | Stop reason: SDUPTHER

## 2023-09-12 NOTE — TELEPHONE ENCOUNTER
Is having heart palpitations they are coming back (coming and going this week). She is taking propanolol 10mg TID. Her high rate has been high as well.Wants to know if this diet related and when she should go to the ER to get an EKG>

## 2023-09-15 ENCOUNTER — HOSPITAL ENCOUNTER (OUTPATIENT)
Dept: LAB | Facility: MEDICAL CENTER | Age: 33
End: 2023-09-15
Attending: STUDENT IN AN ORGANIZED HEALTH CARE EDUCATION/TRAINING PROGRAM
Payer: COMMERCIAL

## 2023-09-15 DIAGNOSIS — R19.7 DIARRHEA, UNSPECIFIED TYPE: ICD-10-CM

## 2023-09-15 PROCEDURE — 36415 COLL VENOUS BLD VENIPUNCTURE: CPT

## 2023-09-15 PROCEDURE — 86364 TISS TRNSGLTMNASE EA IG CLAS: CPT | Mod: 91

## 2023-09-15 PROCEDURE — 82784 ASSAY IGA/IGD/IGG/IGM EACH: CPT

## 2023-09-17 LAB
IGA SERPL-MCNC: 178 MG/DL (ref 68–408)
TTG IGA SER IA-ACNC: <2 U/ML (ref 0–3)

## 2023-09-18 DIAGNOSIS — E05.90 HYPERTHYROIDISM: ICD-10-CM

## 2023-09-18 DIAGNOSIS — R00.2 PALPITATIONS: ICD-10-CM

## 2023-09-18 DIAGNOSIS — E05.00 GRAVES DISEASE: ICD-10-CM

## 2023-09-18 RX ORDER — METHIMAZOLE 10 MG/1
10 TABLET ORAL 3 TIMES DAILY
Qty: 90 TABLET | Refills: 2 | Status: SHIPPED | OUTPATIENT
Start: 2023-09-18 | End: 2023-12-21

## 2023-09-18 RX ORDER — PROPRANOLOL HYDROCHLORIDE 10 MG/1
10 TABLET ORAL 3 TIMES DAILY
Qty: 270 TABLET | Refills: 0 | Status: SHIPPED | OUTPATIENT
Start: 2023-09-18 | End: 2023-12-17

## 2023-09-19 LAB — TTG IGG SER IA-ACNC: 2 U/ML (ref 0–5)

## 2023-09-22 ENCOUNTER — OFFICE VISIT (OUTPATIENT)
Dept: MEDICAL GROUP | Facility: MEDICAL CENTER | Age: 33
End: 2023-09-22
Payer: COMMERCIAL

## 2023-09-22 VITALS
OXYGEN SATURATION: 96 % | SYSTOLIC BLOOD PRESSURE: 106 MMHG | BODY MASS INDEX: 32.28 KG/M2 | TEMPERATURE: 97.1 F | WEIGHT: 171 LBS | HEART RATE: 79 BPM | RESPIRATION RATE: 18 BRPM | HEIGHT: 61 IN | DIASTOLIC BLOOD PRESSURE: 68 MMHG

## 2023-09-22 DIAGNOSIS — E66.9 OBESITY (BMI 30.0-34.9): ICD-10-CM

## 2023-09-22 DIAGNOSIS — R10.83 ABDOMINAL COLIC: ICD-10-CM

## 2023-09-22 DIAGNOSIS — Z23 NEED FOR VACCINATION: ICD-10-CM

## 2023-09-22 PROBLEM — B35.1 ONYCHOMYCOSIS: Status: ACTIVE | Noted: 2017-08-22

## 2023-09-22 PROBLEM — F41.1 GENERALIZED ANXIETY DISORDER: Status: ACTIVE | Noted: 2017-06-27

## 2023-09-22 PROCEDURE — 3078F DIAST BP <80 MM HG: CPT | Performed by: STUDENT IN AN ORGANIZED HEALTH CARE EDUCATION/TRAINING PROGRAM

## 2023-09-22 PROCEDURE — 90471 IMMUNIZATION ADMIN: CPT | Performed by: STUDENT IN AN ORGANIZED HEALTH CARE EDUCATION/TRAINING PROGRAM

## 2023-09-22 PROCEDURE — 90686 IIV4 VACC NO PRSV 0.5 ML IM: CPT | Performed by: STUDENT IN AN ORGANIZED HEALTH CARE EDUCATION/TRAINING PROGRAM

## 2023-09-22 PROCEDURE — 99213 OFFICE O/P EST LOW 20 MIN: CPT | Mod: 25 | Performed by: STUDENT IN AN ORGANIZED HEALTH CARE EDUCATION/TRAINING PROGRAM

## 2023-09-22 PROCEDURE — 3074F SYST BP LT 130 MM HG: CPT | Performed by: STUDENT IN AN ORGANIZED HEALTH CARE EDUCATION/TRAINING PROGRAM

## 2023-09-22 PROCEDURE — 90651 9VHPV VACCINE 2/3 DOSE IM: CPT | Performed by: STUDENT IN AN ORGANIZED HEALTH CARE EDUCATION/TRAINING PROGRAM

## 2023-09-22 PROCEDURE — 90472 IMMUNIZATION ADMIN EACH ADD: CPT | Performed by: STUDENT IN AN ORGANIZED HEALTH CARE EDUCATION/TRAINING PROGRAM

## 2023-09-22 RX ORDER — DICYCLOMINE HYDROCHLORIDE 10 MG/1
10 CAPSULE ORAL 3 TIMES DAILY PRN
Qty: 60 CAPSULE | Refills: 0 | Status: SHIPPED | OUTPATIENT
Start: 2023-09-22 | End: 2024-03-18

## 2023-09-22 ASSESSMENT — FIBROSIS 4 INDEX: FIB4 SCORE: 0.49

## 2023-09-22 NOTE — PROGRESS NOTES
"Subjective:     CC: Follow-up    HPI:   Deisy presents today to follow-up on her lab results.  Due to her ongoing intermittent diarrhea symptoms they did celiac disease work-up which was negative.    For hyperthyroidism she is already started on methimazole 10 mg 3 times daily and propranolol 10 mg 3 times daily.  She has established care with endocrinology and is going to follow-up with them.  Currently she reports she is doing much better with a lot of improvement in the symptoms.    She is also following up with urology for kidney stones.  She is traveling to [place] and once she is back she is going to schedule her procedure for stone removal.        Health Maintenance: Completed    ROS:  ROS    Review of systems unremarkable except for concerns noted by patient or items listed.    Please see HPI for additional ROS.      Objective:     Exam:  /68 (BP Location: Left arm, Patient Position: Sitting, BP Cuff Size: Large adult long)   Pulse 79   Temp 36.2 °C (97.1 °F) (Temporal)   Resp 18   Ht 1.549 m (5' 1\") Comment: Pt reported  Wt 77.6 kg (171 lb)   LMP 09/03/2023   SpO2 96%   BMI 32.31 kg/m²  Body mass index is 32.31 kg/m².    Physical Exam  Constitutional:       Appearance: Normal appearance.   HENT:      Head: Normocephalic.   Eyes:      General: No scleral icterus.  Cardiovascular:      Rate and Rhythm: Normal rate and regular rhythm.      Pulses: Normal pulses.      Heart sounds: Normal heart sounds.   Pulmonary:      Effort: Pulmonary effort is normal.      Breath sounds: Normal breath sounds.   Musculoskeletal:      Right lower leg: No edema.      Left lower leg: No edema.   Skin:     General: Skin is warm.   Neurological:      Mental Status: She is alert and oriented to person, place, and time.   Psychiatric:         Mood and Affect: Mood normal.         Behavior: Behavior normal.             Labs: Reviewed    Assessment & Plan:     32 y.o. female with the following -     1. Need for " vaccination  - 9VHPV Vaccine 2-3 Dose (GARDASIL 9)  - INFLUENZA VACCINE QUAD INJ (PF)    2. Abdominal colic  Patient with intermittent abdominal colic and diarrhea which is getting better with treatment for hypothyroidism but still has few symptoms.  Education and reassurance provided  As needed dicyclomine for pain management  Encouraged her to have good fiber diet and hydration.  We will follow-up in few weeks for a Pap smear and repeat labs to check electrolytes.    - dicyclomine (BENTYL) 10 MG Cap; Take 1 Capsule by mouth 3 times a day as needed (colic pain).  Dispense: 60 Capsule; Refill: 0  3.  Obesity BMI 32.31 kg/m2  Appropriate counseling provided for diet and exercise     Return in about 3 months (around 12/22/2023) for preventive wellness, Pap smear.    Please note that this dictation was created using voice recognition software. I have made every reasonable attempt to correct obvious errors, but I expect that there are errors of grammar and possibly content that I did not discover before finalizing the note.

## 2023-09-25 ENCOUNTER — PATIENT MESSAGE (OUTPATIENT)
Dept: ENDOCRINOLOGY | Facility: MEDICAL CENTER | Age: 33
End: 2023-09-25
Payer: COMMERCIAL

## 2023-09-25 DIAGNOSIS — H02.401 PTOSIS OF RIGHT EYELID: ICD-10-CM

## 2023-09-25 DIAGNOSIS — E05.00 GRAVES DISEASE: ICD-10-CM

## 2023-09-25 DIAGNOSIS — E07.9 THYROID EYE DISEASE: ICD-10-CM

## 2023-09-25 DIAGNOSIS — H57.89 THYROID EYE DISEASE: ICD-10-CM

## 2023-09-26 NOTE — PROGRESS NOTES
Patient with Graves disease reports new eye symptoms:  - eye discomfort, double vision - resolved, mild periorbital swelling, intermittent eye redness, bothersome ptosis R >L, she denies worsening ptosis during the day. Concerns of ALEJANDRO, unlikely myasthenia gravis. Will refer patient to neuro-ophthalmologist.

## 2023-09-29 ENCOUNTER — HOSPITAL ENCOUNTER (OUTPATIENT)
Dept: LAB | Facility: MEDICAL CENTER | Age: 33
End: 2023-09-29
Attending: STUDENT IN AN ORGANIZED HEALTH CARE EDUCATION/TRAINING PROGRAM
Payer: COMMERCIAL

## 2023-09-29 DIAGNOSIS — E05.00 GRAVES DISEASE: ICD-10-CM

## 2023-09-29 LAB
T3 SERPL-MCNC: 165 NG/DL (ref 60–181)
T4 FREE SERPL-MCNC: 0.98 NG/DL (ref 0.93–1.7)
TSH SERPL DL<=0.005 MIU/L-ACNC: <0.005 UIU/ML (ref 0.38–5.33)

## 2023-09-29 PROCEDURE — 84480 ASSAY TRIIODOTHYRONINE (T3): CPT

## 2023-09-29 PROCEDURE — 84443 ASSAY THYROID STIM HORMONE: CPT

## 2023-09-29 PROCEDURE — 84439 ASSAY OF FREE THYROXINE: CPT

## 2023-09-29 PROCEDURE — 36415 COLL VENOUS BLD VENIPUNCTURE: CPT

## 2023-10-02 ENCOUNTER — HOSPITAL ENCOUNTER (OUTPATIENT)
Dept: RADIOLOGY | Facility: MEDICAL CENTER | Age: 33
End: 2023-10-02
Attending: STUDENT IN AN ORGANIZED HEALTH CARE EDUCATION/TRAINING PROGRAM
Payer: COMMERCIAL

## 2023-10-02 DIAGNOSIS — E05.00 GRAVES DISEASE: ICD-10-CM

## 2023-10-02 PROCEDURE — 76536 US EXAM OF HEAD AND NECK: CPT

## 2023-10-06 ENCOUNTER — PATIENT MESSAGE (OUTPATIENT)
Dept: ENDOCRINOLOGY | Facility: MEDICAL CENTER | Age: 33
End: 2023-10-06
Payer: COMMERCIAL

## 2023-10-06 ENCOUNTER — PATIENT MESSAGE (OUTPATIENT)
Dept: MEDICAL GROUP | Facility: MEDICAL CENTER | Age: 33
End: 2023-10-06
Payer: COMMERCIAL

## 2023-10-06 DIAGNOSIS — R19.8 GI SYMPTOMS: ICD-10-CM

## 2023-10-06 DIAGNOSIS — R10.30 RECURRENT LOWER ABDOMINAL PAIN: ICD-10-CM

## 2023-10-08 NOTE — PROGRESS NOTES
Follow up Endocrinology Visit  Initial consult/last visit on: 9/7/23  Referred by:  Nanci Lucero M.D.    Chief complaint:  Deisy Gilbert, is a very pleasant 32 y.o.female, who is here for follow up for Graves disease    Interval history:   - since last visit feeling much better: tremor stopped, muscle weakness resolved, able to squat without any problem, currently using cane intermittently with back pains, which she associates with kidney stones  - diarrhea improved, but not resolved, patient asked for referral to GI specialist from PCP  - reports new right eye periorbital edema, gritty sensation, light sensitivity, ptosis, intermittent blurry vision, scheduled with neuro-ophthalmologist on 10/17/2023  - plans for surgical kidney stones removal on 10/18/2023  - reviewed with the patient new labs and thyroid US    ALEJANDRO symptoms:  Dry, gritty eye  sensation - in R eye  Light sensitivity - in R eye  Tearing - no  Red eye - no  Proptosis -  R eye  Pain or pressure behind the eye - pressure behind R eye  Retroorbital pain - no  Pain with eye movement  - no   Eyelid retraction -  no  Eyelid swelling - R eye  Discomfort or pain in or behind the eye with looking L/R or up/down - no  Double vision - no  Blurry vision - intermittently  Color vision loss - no  Vision loss - no    Hyperthyroidism HPI:  - was feeling tired for many months  - she lost 11 lb but associated with recent loss of her friend  - since beginning of 8/2023 patient noted nausea, diarrhea (having up to 4 BMS/day, watery stools), later on she noted palpitations  - she also noted worsening of her anxiety, reports  insomnia, sweating/hot flashes, heaviness on her chest, hand tremors, muscle weakness to the point that she needs to use cane to walk - persistent  - on labs from 8/23/23 - biochemical evidence of hyperthyroidism + elevated TrAbs  - was started on MMI 30 mg/day + propranolol 10 mg TID - no improvement of symptoms so far  - nonsmoker  - Fhx -  "thyroid disease in her aunt, not sure about the diagnosis    Medications:  Current Outpatient Medications:     dicyclomine (BENTYL) 10 MG Cap, Take 1 Capsule by mouth 3 times a day as needed (colic pain)., Disp: 60 Capsule, Rfl: 0    propranolol (INDERAL) 10 MG Tab, Take 1 Tablet by mouth 3 times a day for 90 days., Disp: 270 Tablet, Rfl: 0    methimazole (TAPAZOLE) 10 MG Tab, Take 1 Tablet by mouth 3 times a day., Disp: 90 Tablet, Rfl: 2    Potassium Citrate 15 MEQ (1620 MG) Tab CR, , Disp: , Rfl:     norgestrel-ethinyl estradiol (LOW-OGESTREL) 0.3-30 MG-MCG Tab, , Disp: , Rfl:     Probiotic Product (PRO-BIOTIC BLEND PO), Take  by mouth., Disp: , Rfl:     Physical Examination:   Vital signs: /86 (BP Location: Right arm, Patient Position: Sitting, BP Cuff Size: Adult)   Pulse 85   Ht 1.549 m (5' 1\")   Wt 79.4 kg (175 lb)   SpO2 96%   BMI 33.07 kg/m² ]  General: No distress, cooperative, well dressed and well nourished.   Eyes: No scleral icterus or discharge.  Right eyelid ptosis, mild right eye proptosis, possible, right periorbital edema, no chemosis, no erythema, double vision with down gaze  ENMT: Normal on external inspection of nose, lips, No nasal drainage   Neck: No abnormal masses on inspection. Thyroid gland is mildly enlarged.   Resp: Normal effort.  CVS: Regular rate and rhythm.  Extremities: No edema bilateral extremities  Neuro: Alert and oriented.   Skin: No rash, No Ulcers  Psych: Normal mood and affect    LEA:  Spontaneous oribtal pain - no  Gaze-evoked orbital pain - no  Eyelid swelling - yes  Eyelid erythema - no  Conjunctival redness - no  Chemosis - no  Inflammation of caruncle or plica - no  LEA 1    Labs:  MOST RECENT LABS:  - reviewed      PRIOR PERTINENT LABS:  - reviewed      Imaging:  - reviewed  Thyroid US on 10/2/2023:  HISTORY/REASON FOR EXAM:  Hyperthyroidism/Graves disease, thyroid gland enlargement.  TECHNIQUE/EXAM DESCRIPTION:  Ultrasound of the soft tissues of the head " and neck.  COMPARISON:  None  FINDINGS:  The thyroid gland is heterogeneous.  Vascularity is increased.  The right lobe of the thyroid gland measures 2.63 cm x 4.56 cm x 2.41 cm. The contour and echogenicity are normal. No focal mass lesions are identified.  The left lobe of the thyroid gland measures 2.08 cm x 4.21 cm x 1.73 cm. The contour and echogenicity are normal. No focal mass lesions are identified.  The isthmus measures 0.54 cm.  IMPRESSION:  1.  Mildly enlarged heterogeneous thyroid gland with hyperemia. Findings are consistent with Graves' disease.    Assessment and Plan:  1. Graves disease      ALEJANDRO (R eye)      Thyroid myopathy, resolved  - clinical and biochemical improvement of hyperthyroidism with normalization of free T4, TSH is still suppressed but likely is lagging in response  - we will decrease MMI to maintenance dose of 10 mg a day  - patient continues to have diarrhea, unlikely related to hyperthyroidism, celiac disease work-up was negative, agree with PCP with GI referral  - new symptoms consistent with right thyroid eye disease, referred to neuro-ophthalmologist    Prior notes:  - clinical and biochemical evidence of hyperthyroidism + elevated TrAbs  - no clinical signs of ALEJANDRO but patient reports eye soreness and double vision - 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 - periodic hypokalemic paralysis and thyrotoxic periodic  paralysis are unlikely given persistent nature of muscle weakness vs myopathic attacks, already on propranolol - treatment for thyrotoxic periodic paralysis  - appropriately started on MMI and nonselective beta-blocker  - we discussed cause and natural history of the Graves disease, treatment approaches  - I explained that it takes up to 3-4 weeks to reach euthyroidism    Plan:  MMI 30 ->10 mg/day.  Repeat TFTs in 4 weeks  Follow neuro-ophthalmologist recommendations.  Meanwhile, use  sunglasses, artificial teardrops, avoid primary and secondary smoking  Repeat TFTS in 4 weeks.  Okay to proceed with surgical removal of the kidney stones.      RTC: 4 weeks    Total time (face-to-face and non-face-to face time):  30 min    Plan reviewed with the patient and agreed with plan.  All questions answered to patient's satisfaction.  Thank you kindly for allowing me to participate in the care plan for this patient.    Kamilah Danielson MD    CC:   Nanci Lucero M.D.

## 2023-10-10 ENCOUNTER — OFFICE VISIT (OUTPATIENT)
Dept: ENDOCRINOLOGY | Facility: MEDICAL CENTER | Age: 33
End: 2023-10-10
Attending: STUDENT IN AN ORGANIZED HEALTH CARE EDUCATION/TRAINING PROGRAM
Payer: COMMERCIAL

## 2023-10-10 VITALS
DIASTOLIC BLOOD PRESSURE: 86 MMHG | HEIGHT: 61 IN | BODY MASS INDEX: 33.04 KG/M2 | OXYGEN SATURATION: 96 % | WEIGHT: 175 LBS | HEART RATE: 85 BPM | SYSTOLIC BLOOD PRESSURE: 118 MMHG

## 2023-10-10 DIAGNOSIS — E05.00 GRAVES DISEASE: ICD-10-CM

## 2023-10-10 PROCEDURE — 3079F DIAST BP 80-89 MM HG: CPT | Performed by: STUDENT IN AN ORGANIZED HEALTH CARE EDUCATION/TRAINING PROGRAM

## 2023-10-10 PROCEDURE — 99211 OFF/OP EST MAY X REQ PHY/QHP: CPT | Performed by: STUDENT IN AN ORGANIZED HEALTH CARE EDUCATION/TRAINING PROGRAM

## 2023-10-10 PROCEDURE — 3074F SYST BP LT 130 MM HG: CPT | Performed by: STUDENT IN AN ORGANIZED HEALTH CARE EDUCATION/TRAINING PROGRAM

## 2023-10-10 PROCEDURE — 99214 OFFICE O/P EST MOD 30 MIN: CPT | Performed by: STUDENT IN AN ORGANIZED HEALTH CARE EDUCATION/TRAINING PROGRAM

## 2023-10-10 ASSESSMENT — FIBROSIS 4 INDEX: FIB4 SCORE: 0.49

## 2023-10-12 ENCOUNTER — OFFICE VISIT (OUTPATIENT)
Dept: MEDICAL GROUP | Facility: MEDICAL CENTER | Age: 33
End: 2023-10-12
Payer: COMMERCIAL

## 2023-10-12 ENCOUNTER — HOSPITAL ENCOUNTER (OUTPATIENT)
Facility: MEDICAL CENTER | Age: 33
End: 2023-10-12
Attending: STUDENT IN AN ORGANIZED HEALTH CARE EDUCATION/TRAINING PROGRAM
Payer: COMMERCIAL

## 2023-10-12 VITALS
BODY MASS INDEX: 33.34 KG/M2 | OXYGEN SATURATION: 98 % | WEIGHT: 176.59 LBS | DIASTOLIC BLOOD PRESSURE: 64 MMHG | RESPIRATION RATE: 18 BRPM | HEART RATE: 81 BPM | TEMPERATURE: 97.5 F | HEIGHT: 61 IN | SYSTOLIC BLOOD PRESSURE: 120 MMHG

## 2023-10-12 DIAGNOSIS — Z12.4 PAP SMEAR FOR CERVICAL CANCER SCREENING: ICD-10-CM

## 2023-10-12 DIAGNOSIS — Z00.00 PREVENTATIVE HEALTH CARE: ICD-10-CM

## 2023-10-12 PROCEDURE — 3074F SYST BP LT 130 MM HG: CPT | Performed by: STUDENT IN AN ORGANIZED HEALTH CARE EDUCATION/TRAINING PROGRAM

## 2023-10-12 PROCEDURE — 88175 CYTOPATH C/V AUTO FLUID REDO: CPT

## 2023-10-12 PROCEDURE — 87624 HPV HI-RISK TYP POOLED RSLT: CPT

## 2023-10-12 PROCEDURE — 3078F DIAST BP <80 MM HG: CPT | Performed by: STUDENT IN AN ORGANIZED HEALTH CARE EDUCATION/TRAINING PROGRAM

## 2023-10-12 PROCEDURE — 99395 PREV VISIT EST AGE 18-39: CPT | Performed by: STUDENT IN AN ORGANIZED HEALTH CARE EDUCATION/TRAINING PROGRAM

## 2023-10-12 ASSESSMENT — FIBROSIS 4 INDEX: FIB4 SCORE: 0.49

## 2023-10-12 NOTE — PROGRESS NOTES
SUBJECTIVE: 32 y.o. female for annual routine gynecologic exam  Chief Complaint   Patient presents with    Annual Exam     Pap        OB History   No obstetric history on file.      Last Pap: > 6 years back, normal   Social History     Substance and Sexual Activity   Sexual Activity Not Currently    Birth control/protection: Pill     H/O Abnormal Pap no  She  reports that she has never smoked. She has never used smokeless tobacco.        Allergies: Lactose     ROS:    Menses every month with 30-35 days moderate bleeding.  Cramping is mild.   She does not take OTC analgesics for cramping  Reports no menopause symptoms of hot flashes, night sweats, sleep disruption, mood changes, vaginal dryness.   No significant bloating/fluid retention, pelvic pain, or dyspareunia. No vaginal discharge   No breast tenderness, mass, nipple discharge, changes in size or contour, or abnormal cyclic discomfort.  No urinary tract symptoms, no incontinence.   Positive for intermittent abdominal pain, change in bowel habits.  No unusual headaches, no visual changes, menstrual migraines   No prolonged cough. No dyspnea or chest pain on exertion.  No depression, labile mood, anxiety ,libido changes, insomnia.  No new/concerning skin lesions, concerns.     Exercise: minimal exercise  Preventive Care:  Health Maintenance Topics with due status: Overdue       Topic Date Due    Cervical Cancer Screening Never done    Hepatitis B Vaccine (Hep B) Never done    Chickenpox Vaccine (Varicella) Never done    COVID-19 Vaccine 01/05/2023       Current medicines (including changes today)  Current Outpatient Medications   Medication Sig Dispense Refill    dicyclomine (BENTYL) 10 MG Cap Take 1 Capsule by mouth 3 times a day as needed (colic pain). 60 Capsule 0    methimazole (TAPAZOLE) 10 MG Tab Take 1 Tablet by mouth 3 times a day. 90 Tablet 2    norgestrel-ethinyl estradiol (LOW-OGESTREL) 0.3-30 MG-MCG Tab       propranolol (INDERAL) 10 MG Tab Take 1  "Tablet by mouth 3 times a day for 90 days. (Patient taking differently: Take 5 mg by mouth 2 times a day.) 270 Tablet 0    Potassium Citrate 15 MEQ (1620 MG) Tab CR       Probiotic Product (PRO-BIOTIC BLEND PO) Take  by mouth.       No current facility-administered medications for this visit.     She  has a past medical history of History of kidney stones.  She  has no past surgical history on file.     Family History:   Family History   Problem Relation Age of Onset    No Known Problems Mother     Eczema Father     No Known Problems Brother        Family History negative for : Colon, Lung, or female organ cancer, thyroid disease, CAD, Diabetes, osteoporosis.     Positive for breast cancer in family.    OBJECTIVE:   /64 (BP Location: Left arm, Patient Position: Sitting, BP Cuff Size: Large adult long)   Pulse 81   Temp 36.4 °C (97.5 °F) (Temporal)   Resp 18   Ht 1.549 m (5' 1\") Comment: Pt reported  Wt 80.1 kg (176 lb 9.4 oz)   LMP 09/26/2023   SpO2 98%   BMI 33.37 kg/m²   Body mass index is 33.37 kg/m².    HEAD AND NECK:  Ears normal.  Throat, oral cavity and tongue normal.  Neck supple. No adenopathy or masses in the neck or supraclavicular regions.  No carotid bruits. No thyromegaly. NEURO: Cranial nerves are normal. DTR's normal and symmetric.  CHEST:  Clear, good air entry, no wheezes or rales. HEART:  Regular rate and rhythm.  S1 and S2 normal.   No edema or JVD. ABDOMEN:  Soft without tenderness, guarding, mass or organomegaly.  No CVA tenderness or inguinal adenopathy. EXTREMITIES:  Extremities, reflexes and peripheral pulses are normal. SKIN: color normal, vascularity normal, no edema, temperature normal   No rashes or suspicious skin lesions noted.     Breast Exam: Performed with instruction during examination. No axillary lymphadenopathy, no skin changes, no dominant masses. No nipple retraction  Pelvic Exam -  Normal external genitalia with no lesions. Normal vaginal mucosa with normal " rugation and scant discharge. Cervix with no visible lesions. No cervical motion tenderness. Uterus is normal sized with no masses. No adnexal tenderness or enlargement appreciated. Sure Path Pap is obtained, vaginal swab is obtained and specimen(s) sent to lab  Rectal: deferred    <ASSESSMENT and PLAN>  1. Pap smear for cervical cancer screening  THINPREP PAP WITH HPV      2. Preventative health care            Discussed  STD prevention, feminine hygiene, use and side effects of OCP's, diet and exercise   Follow-up in 3 years for next Gyn exam and 5 years for next Pap.   Next office visit for recheck of chronic medical conditions is due in 4 months

## 2023-10-13 ENCOUNTER — HOSPITAL ENCOUNTER (OUTPATIENT)
Dept: CARDIOLOGY | Facility: MEDICAL CENTER | Age: 33
End: 2023-10-13
Attending: UROLOGY
Payer: COMMERCIAL

## 2023-10-13 LAB — EKG IMPRESSION: NORMAL

## 2023-10-13 PROCEDURE — 93010 ELECTROCARDIOGRAM REPORT: CPT | Performed by: INTERNAL MEDICINE

## 2023-10-13 PROCEDURE — 93005 ELECTROCARDIOGRAM TRACING: CPT | Performed by: UROLOGY

## 2023-10-17 ENCOUNTER — OFFICE VISIT (OUTPATIENT)
Dept: OPHTHALMOLOGY | Facility: MEDICAL CENTER | Age: 33
End: 2023-10-17
Payer: COMMERCIAL

## 2023-10-17 ENCOUNTER — HOSPITAL ENCOUNTER (OUTPATIENT)
Dept: LAB | Facility: MEDICAL CENTER | Age: 33
End: 2023-10-17
Attending: STUDENT IN AN ORGANIZED HEALTH CARE EDUCATION/TRAINING PROGRAM
Payer: COMMERCIAL

## 2023-10-17 DIAGNOSIS — E07.9 THYROID EYE DISEASE: ICD-10-CM

## 2023-10-17 DIAGNOSIS — H57.89 THYROID EYE DISEASE: ICD-10-CM

## 2023-10-17 DIAGNOSIS — H02.401 PTOSIS OF RIGHT EYELID: ICD-10-CM

## 2023-10-17 LAB
CYTOLOGIST CVX/VAG CYTO: NORMAL
CYTOLOGY CVX/VAG DOC CYTO: NORMAL
CYTOLOGY CVX/VAG DOC THIN PREP: NORMAL
HPV I/H RISK 4 DNA CVX QL PROBE+SIG AMP: NEGATIVE
NOTE NL11727A: NORMAL
OTHER STN SPEC: NORMAL
QC REVIEWED BY NL11722A: NORMAL
STAT OF ADQ CVX/VAG CYTO-IMP: NORMAL

## 2023-10-17 PROCEDURE — 99204 OFFICE O/P NEW MOD 45 MIN: CPT | Mod: 25 | Performed by: STUDENT IN AN ORGANIZED HEALTH CARE EDUCATION/TRAINING PROGRAM

## 2023-10-17 PROCEDURE — 83519 RIA NONANTIBODY: CPT

## 2023-10-17 PROCEDURE — 83516 IMMUNOASSAY NONANTIBODY: CPT

## 2023-10-17 PROCEDURE — 92250 FUNDUS PHOTOGRAPHY W/I&R: CPT | Performed by: STUDENT IN AN ORGANIZED HEALTH CARE EDUCATION/TRAINING PROGRAM

## 2023-10-17 PROCEDURE — 36415 COLL VENOUS BLD VENIPUNCTURE: CPT

## 2023-10-17 ASSESSMENT — MARGIN REFLEX DISTANCE
OS_MRD2: 6
OS_MRD1: 5
OD_MRD1: 3
OD_MRD2: 7

## 2023-10-17 ASSESSMENT — VISUAL ACUITY
OS_CC: J1+
OS_CC: 20/20
OD_CC: J1+
METHOD: SNELLEN - LINEAR
OD_CC: 20/20
CORRECTION_TYPE: GLASSES

## 2023-10-17 ASSESSMENT — REFRACTION_MANIFEST
OD_CYLINDER: +0.75
OS_AXIS: 168
OD_SPHERE: -0.50
OS_SPHERE: +0.25
METHOD_AUTOREFRACTION: 1
OS_CYLINDER: -0.75
OD_AXIS: 013

## 2023-10-17 ASSESSMENT — TONOMETRY
OD_IOP_MMHG: 21
OS_IOP_MMHG: 21
IOP_METHOD: I-CARE

## 2023-10-17 ASSESSMENT — REFRACTION_WEARINGRX
OD_CYLINDER: -1.00
OS_SPHERE: -1.00
OS_CYLINDER: -0.50
OS_AXIS: 163
OD_SPHERE: -1.00
OD_AXIS: 018
SPECS_TYPE: SVL

## 2023-10-17 ASSESSMENT — CUP TO DISC RATIO
OS_RATIO: 0.3
OD_RATIO: 0.3

## 2023-10-17 ASSESSMENT — SLIT LAMP EXAM - LIDS
COMMENTS: NORMAL
COMMENTS: NORMAL

## 2023-10-17 ASSESSMENT — CONF VISUAL FIELD
OD_SUPERIOR_NASAL_RESTRICTION: 0
OS_NORMAL: 1
OS_SUPERIOR_NASAL_RESTRICTION: 0
OD_INFERIOR_TEMPORAL_RESTRICTION: 0
OS_INFERIOR_TEMPORAL_RESTRICTION: 0
OS_INFERIOR_NASAL_RESTRICTION: 0
OD_NORMAL: 1
OD_INFERIOR_NASAL_RESTRICTION: 0
OD_SUPERIOR_TEMPORAL_RESTRICTION: 0
OS_SUPERIOR_TEMPORAL_RESTRICTION: 0

## 2023-10-17 ASSESSMENT — ENCOUNTER SYMPTOMS: HEADACHES: 1

## 2023-10-17 ASSESSMENT — EXTERNAL EXAM - LEFT EYE: OS_EXAM: TRACE UPPER LID EDEMA

## 2023-10-17 NOTE — ASSESSMENT & PLAN NOTE
Ptosis OD starting in September 2023. Ptosis fluctuates and worsens with fatigue. Possibly related to David however trace lid edema is equal OU. Discussed possibility of other autoimmune conditions concurrently occurring such as MG. Will obtain AchR Ab given fluctuation. No evidence of anisocoria, EOM abnormalities and pupils appear brisk. Pt reports no recent neck manipulations or injury. Symptomatically denies any unitlateral headache, weakness/numbness.  Low suspicion for 3rd or Horners.

## 2023-10-17 NOTE — ASSESSMENT & PLAN NOTE
Diagnosed with Graves disease and followed with Dr Danielson    Currently denies any double vision, blurred vision, loss of vision. Subjectively reports proptosis and ptosis OD since September. Pt is being treated with Methimazole with normalization of  FT4.    Exam: OD slightly more proptotic than OS although within range of normal. Normal resistance to retropulsion. No chemosis or injection. Afferent and efferent function normal.     Discussed with patient that examination is normal. No concerns for optic nerve involvement at this time. Discussed LEA score low (~1-2). Discussed treatment options vs monitoring given low disease activity at this time. Pt prefers to monitor but will update if any changes in vision or concerns of worsening proptosis.     Plan:   -RTC in 6 months time

## 2023-10-17 NOTE — PROGRESS NOTES
Peds/Neuro Ophthalmology:   Landy Christiansen M.D.    Date & Time note created:    10/17/2023   9:12 AM     Referring MD / APRN:  Nanci Lucero M.D., No att. providers found    Patient ID:  Name:             Deisy Gilbert     YOB: 1990  Age:                 33 y.o.  female   MRN:               [id number]    Chief Complaint/Reason for Visit:     Graves' Disease (New patient evaluation for both eyes)      History of Present Illness:      New patient evaluation for graves diease.    Ms Vladimir currently reports that her vision is fine. She denies any loss of vision, double vision, blurred vision currently. She does wear glasses full time. Her vision can occasionally get really blurry for a few seconds, but resolves with blinking. When initially diagnosed with Graves, patient reported some double vision. The double vision was oblique in the right gaze. She did not attempt to close either eye to see if it was resolved, therefore she was unsure if  it was binocular. After she started methimazole the double vision went away in 1 month (10/10 no double vision).    Pt does feel her right eye is more bulging more and tender than the left eye. Patient states the right eye began drooping in September and that the right eye is slightly more bulging more than the left. The ptosis can fluctuate, and is worse when more fatigued. She additionally reports diffuse muscle weakness in September which improved with methimazole. She does get watery itchy, and dry eyes OU. Does not use eye drops at this time. Pt does have seasonal allergies but never uses medication.    Pt has surgery Thursday for kidney stones    Endocrinology: Kamilah Danielson  Methimazole 1mg , weaning of propanolol   No thymectomy        Review of Systems:  Review of Systems   Eyes:         Graves disease     Neurological:  Positive for headaches.   All other systems reviewed and are negative.      Past Medical History:   Past Medical History:   Diagnosis  Date    Graves disease 08/18/2023    History of kidney stones        Past Surgical History:  Past Surgical History:   Procedure Laterality Date    CYSTOSCOPY STENT PLACEMENT  2017       Current Outpatient Medications:  Current Outpatient Medications   Medication Sig Dispense Refill    dicyclomine (BENTYL) 10 MG Cap Take 1 Capsule by mouth 3 times a day as needed (colic pain). 60 Capsule 0    propranolol (INDERAL) 10 MG Tab Take 1 Tablet by mouth 3 times a day for 90 days. (Patient taking differently: Take 5 mg by mouth 2 times a day.) 270 Tablet 0    methimazole (TAPAZOLE) 10 MG Tab Take 1 Tablet by mouth 3 times a day. 90 Tablet 2    Potassium Citrate 15 MEQ (1620 MG) Tab CR       norgestrel-ethinyl estradiol (LOW-OGESTREL) 0.3-30 MG-MCG Tab       Probiotic Product (PRO-BIOTIC BLEND PO) Take  by mouth.       No current facility-administered medications for this visit.       Allergies:  Allergies   Allergen Reactions    Lactose      Other reaction(s): GI Intolerance       Family History:  Family History   Problem Relation Age of Onset    Hypertension Mother     Eczema Father     No Known Problems Brother     Breast Cancer Paternal Aunt     Diabetes Paternal Uncle     Hypertension Maternal Grandmother        Social History:  Social History     Socioeconomic History    Marital status: Single     Spouse name: Not on file    Number of children: Not on file    Years of education: Not on file    Highest education level: Bachelor's degree (e.g., BA, AB, BS)   Occupational History    Not on file   Tobacco Use    Smoking status: Former     Types: Cigarettes    Smokeless tobacco: Never   Vaping Use    Vaping Use: Never used   Substance and Sexual Activity    Alcohol use: Not Currently    Drug use: Not Currently    Sexual activity: Not Currently     Birth control/protection: Pill   Other Topics Concern    Not on file   Social History Narrative    Senior tech artist      Social Determinants of Health     Financial Resource  Strain: Low Risk  (8/21/2023)    Overall Financial Resource Strain (CARDIA)     Difficulty of Paying Living Expenses: Not very hard   Food Insecurity: No Food Insecurity (8/21/2023)    Hunger Vital Sign     Worried About Running Out of Food in the Last Year: Never true     Ran Out of Food in the Last Year: Never true   Transportation Needs: No Transportation Needs (8/21/2023)    PRAPARE - Transportation     Lack of Transportation (Medical): No     Lack of Transportation (Non-Medical): No   Physical Activity: Insufficiently Active (8/21/2023)    Exercise Vital Sign     Days of Exercise per Week: 3 days     Minutes of Exercise per Session: 30 min   Stress: Stress Concern Present (8/21/2023)    [employer] - Occupational Stress Questionnaire     Feeling of Stress : To some extent   Social Connections: Socially Isolated (8/21/2023)    Social Connection and Isolation Panel [NHANES]     Frequency of Communication with Friends and Family: More than three times a week     Frequency of Social Gatherings with Friends and Family: Patient refused     Attends Zoroastrian Services: Never     Active Member of Clubs or Organizations: No     Attends Club or Organization Meetings: Never     Marital Status: Never    Intimate Partner Violence: Not on file   Housing Stability: Low Risk  (8/21/2023)    Housing Stability Vital Sign     Unable to Pay for Housing in the Last Year: No     Number of Places Lived in the Last Year: 1     Unstable Housing in the Last Year: No          Physical Exam:  Physical Exam  Neurological:      Comments: 5/5 orbicularis oculi strength  Upgaze fatigability  No cogans twitch          Oriented x 3  Weight/BMI: There is no height or weight on file to calculate BMI.  There were no vitals taken for this visit.    Base Eye Exam       Visual Acuity (Snellen - Linear)         Right Left    Dist cc 20/20 20/20    Near cc J1+ J1+      Correction: Glasses              Tonometry  ([facility], 7:47 AM)         Right Left    Pressure 21 21              Pupils         Pupils Dark Light Shape React APD    Right PERRL 3 2 Round Brisk None    Left PERRL 3 2 Round Brisk None              Visual Fields         Right Left     Full Full              Extraocular Movement         Right Left     Full, Ortho Full, Ortho              Neuro/Psych       Oriented x3: Yes    Mood/Affect: Normal                  Additional Tests       Color         Right Left    [place] 12/12 12/12              Stereo       Fly: +    Animals: 3/3    Circles: 9/9                  Slit Lamp and Fundus Exam       External Exam         Right Left    External partial ptosis, trace upper lid edema trace upper lid edema    MRD1 3 mm 5 mm    MRD2 7 mm 6 mm    Normal resistance to retropulsion              Exophthalmometry (Base: 120 mm)         Right Left     19 mm 18 mm              Slit Lamp Exam         Right Left    Lids/Lashes Normal Normal    Conjunctiva/Sclera White and quiet White and quiet    Cornea Clear Clear    Anterior Chamber Deep and quiet Deep and quiet    Iris Round and reactive Round and reactive    Lens Clear Clear              Fundus Exam         Right Left    Disc Normal Normal    C/D Ratio 0.3 0.3    Macula Normal Normal                  Refraction       Wearing Rx         Sphere Cylinder Axis    Right -1.00 -1.00 018    Left -1.00 -0.50 163      Age: 3m    Type: SVL              Manifest Refraction (Auto)         Sphere Cylinder Axis    Right -0.50 +0.75 013    Left +0.25 -0.75 168                    Pertinent Lab/Test/Imaging Review:  FT4 9/2023 0.98  TSH < 0.005    Assessment and Plan:     Thyroid eye disease  Diagnosed with Graves disease and followed with Dr Danielson    Currently denies any double vision, blurred vision, loss of vision. Subjectively reports proptosis and ptosis OD since September. Pt is being treated with Methimazole with normalization of  FT4.    Exam: OD slightly more proptotic than OS although  within range of normal. Normal resistance to retropulsion. No chemosis or injection. Afferent and efferent function normal.     Discussed with patient that examination is normal. No concerns for optic nerve involvement at this time. Discussed LEA score low (~1-2). Discussed treatment options vs monitoring given low disease activity at this time. Pt prefers to monitor but will update if any changes in vision or concerns of worsening proptosis.     Plan:   -RTC in 6 months time    Ptosis of right eyelid  Ptosis OD starting in September 2023. Ptosis fluctuates and worsens with fatigue. Possibly related to David however trace lid edema is equal OU. Discussed possibility of other autoimmune conditions concurrently occurring such as MG. Will obtain AchR Ab given fluctuation. No evidence of anisocoria, EOM abnormalities and pupils appear brisk. Pt reports no recent neck manipulations or injury. Symptomatically denies any unitlateral headache, weakness/numbness.  Low suspicion for 3rd or Horners.         Landy Christiansen M.D.

## 2023-10-19 LAB
ACHR BIND AB SER-SCNC: 0 NMOL/L (ref 0–0.4)
ACHR BLOCK AB/ACHR TOTAL SFR SER: 10 % (ref 0–26)

## 2023-10-21 ENCOUNTER — PATIENT MESSAGE (OUTPATIENT)
Dept: MEDICAL GROUP | Facility: MEDICAL CENTER | Age: 33
End: 2023-10-21
Payer: COMMERCIAL

## 2023-10-24 RX ORDER — NORGESTREL AND ETHINYL ESTRADIOL 0.3-0.03MG
1 KIT ORAL DAILY
Qty: 84 TABLET | Refills: 4 | Status: SHIPPED | OUTPATIENT
Start: 2023-10-24

## 2023-11-06 ENCOUNTER — HOSPITAL ENCOUNTER (OUTPATIENT)
Dept: LAB | Facility: MEDICAL CENTER | Age: 33
End: 2023-11-06
Attending: STUDENT IN AN ORGANIZED HEALTH CARE EDUCATION/TRAINING PROGRAM
Payer: COMMERCIAL

## 2023-11-06 ENCOUNTER — TELEPHONE (OUTPATIENT)
Dept: ENDOCRINOLOGY | Facility: MEDICAL CENTER | Age: 33
End: 2023-11-06
Payer: COMMERCIAL

## 2023-11-06 DIAGNOSIS — E05.00 GRAVES DISEASE: ICD-10-CM

## 2023-11-06 LAB
T4 FREE SERPL-MCNC: 1.56 NG/DL (ref 0.93–1.7)
TSH SERPL DL<=0.005 MIU/L-ACNC: <0.005 UIU/ML (ref 0.38–5.33)

## 2023-11-06 PROCEDURE — 36415 COLL VENOUS BLD VENIPUNCTURE: CPT

## 2023-11-06 PROCEDURE — 84443 ASSAY THYROID STIM HORMONE: CPT

## 2023-11-06 PROCEDURE — 84439 ASSAY OF FREE THYROXINE: CPT

## 2023-11-06 NOTE — PROGRESS NOTES
Follow up Endocrinology Visit  Initial consult/last visit on: 9/7/23  Last visit on: 10/10/23  Referred by:  Nanci Lucero M.D.    Chief complaint:  Deisy Gilbert, is a very pleasant 32 y.o.female, who is here for follow up for Graves disease    Interval history:   - overall doing much better, however still feels very tired,  despite sleeping well  - gained couple lb since last visit  - still has diarrhea, even it much improved since initiation Rx with MMI; plans for GI evaluation  - had surgery for kidney stone removal, unfortunately, kidney stone was no evaluated for composition  - still has R eye ptosis and tearing, myasthenia gravis was ruled out  - followed by neuro-ophthalmologist, for now recommended active surveillance  - recent labs were reviewed    Prior notes:  - since last visit feeling much better: tremor stopped, muscle weakness resolved, able to squat without any problem, currently using cane intermittently with back pains, which she associates with kidney stones  - diarrhea improved, but not resolved, patient asked for referral to GI specialist from PCP  - reports new right eye periorbital edema, gritty sensation, light sensitivity, ptosis, intermittent blurry vision, scheduled with neuro-ophthalmologist on 10/17/2023  - plans for surgical kidney stones removal on 10/18/2023  - reviewed with the patient new labs and thyroid US    ALEJANDRO symptoms:  Dry, gritty eye  sensation - in R eye  Light sensitivity - in R eye  Tearing - no  Red eye - no  Proptosis -  R eye  Pain or pressure behind the eye - pressure behind R eye  Retroorbital pain - no  Pain with eye movement  - no   Eyelid retraction -  no  Eyelid swelling - R eye  Discomfort or pain in or behind the eye with looking L/R or up/down - no  Double vision - no  Blurry vision - intermittently  Color vision loss - no  Vision loss - no    Hyperthyroidism HPI:  - was feeling tired for many months  - she lost 11 lb but associated with recent loss of her  "friend  - since beginning of 8/2023 patient noted nausea, diarrhea (having up to 4 BMS/day, watery stools), later on she noted palpitations  - she also noted worsening of her anxiety, reports  insomnia, sweating/hot flashes, heaviness on her chest, hand tremors, muscle weakness to the point that she needs to use cane to walk - persistent  - on labs from 8/23/23 - biochemical evidence of hyperthyroidism + elevated TrAbs  - was started on MMI 30 mg/day + propranolol 10 mg TID - no improvement of symptoms so far  - nonsmoker  - Fhx - thyroid disease in her aunt, not sure about the diagnosis    Medications:  Current Outpatient Medications:     norgestrel-ethinyl estradiol (LOW-OGESTREL) 0.3-30 MG-MCG Tab, Take 1 Tablet by mouth every day., Disp: 84 Tablet, Rfl: 4    dicyclomine (BENTYL) 10 MG Cap, Take 1 Capsule by mouth 3 times a day as needed (colic pain)., Disp: 60 Capsule, Rfl: 0    propranolol (INDERAL) 10 MG Tab, Take 1 Tablet by mouth 3 times a day for 90 days. (Patient taking differently: Take 5 mg by mouth 2 times a day.), Disp: 270 Tablet, Rfl: 0    methimazole (TAPAZOLE) 10 MG Tab, Take 1 Tablet by mouth 3 times a day., Disp: 90 Tablet, Rfl: 2    Potassium Citrate 15 MEQ (1620 MG) Tab CR, , Disp: , Rfl:     Probiotic Product (PRO-BIOTIC BLEND PO), Take  by mouth., Disp: , Rfl:     Physical Examination:   Vital signs: /80 (BP Location: Right arm, Patient Position: Sitting, BP Cuff Size: Adult)   Pulse (!) 102   Ht 1.549 m (5' 1\")   Wt 80.7 kg (178 lb)   LMP 10/27/2023   SpO2 96%   BMI 33.63 kg/m²   General: No distress, cooperative, well dressed and well nourished.   Eyes: No scleral icterus or discharge.  Right eyelid ptosis, mild right eye proptosis, possible, right periorbital edema, no chemosis, no erythema, double vision with down gaze  ENMT: Normal on external inspection of nose, lips, No nasal drainage   Neck: No abnormal masses on inspection. Thyroid gland is mildly enlarged.   Resp: Normal " effort.  CVS: Regular rate and rhythm.  Extremities: No edema bilateral extremities  Neuro: Alert and oriented.   Skin: No rash, No Ulcers  Psych: Normal mood and affect    LEA:  Spontaneous oribtal pain - no  Gaze-evoked orbital pain - no  Eyelid swelling - yes  Eyelid erythema - no  Conjunctival redness - no  Chemosis - no  Inflammation of caruncle or plica - no  LEA 1    Labs:  MOST RECENT LABS:  - reviewed      PRIOR PERTINENT LABS:  - reviewed          Imaging:  - reviewed  Thyroid US on 10/2/2023:  HISTORY/REASON FOR EXAM:  Hyperthyroidism/Graves disease, thyroid gland enlargement.  TECHNIQUE/EXAM DESCRIPTION:  Ultrasound of the soft tissues of the head and neck.  COMPARISON:  None  FINDINGS:  The thyroid gland is heterogeneous.  Vascularity is increased.  The right lobe of the thyroid gland measures 2.63 cm x 4.56 cm x 2.41 cm. The contour and echogenicity are normal. No focal mass lesions are identified.  The left lobe of the thyroid gland measures 2.08 cm x 4.21 cm x 1.73 cm. The contour and echogenicity are normal. No focal mass lesions are identified.  The isthmus measures 0.54 cm.  IMPRESSION:  1.  Mildly enlarged heterogeneous thyroid gland with hyperemia. Findings are consistent with Graves' disease.    Assessment and Plan:  1. 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 - periodic  hypokalemic paralysis and thyrotoxic periodic  paralysis are unlikely given persistent nature of muscle weakness vs myopathic attacks, already on propranolol - treatment for thyrotoxic periodic paralysis  - appropriately started on MMI and nonselective beta-blocker  - we discussed cause and natural history of the Graves disease, treatment approaches  - I explained that it takes up to 3-4 weeks to reach euthyroidism  - clinical and biochemical improvement of hyperthyroidism with normalization of free T4, TSH is still suppressed but likely is lagging in response  - we will decrease MMI to maintenance dose of 10 mg a day  - patient continues to have diarrhea, unlikely related to hyperthyroidism, celiac disease work-up was negative, agree with PCP with GI referral  - new symptoms consistent with right thyroid eye disease, referred to neuro-ophthalmologist    Plan:  MMI 10 ->15 mg/day x 2 weeks -> 10 mg/day  Repeat TFTs in  2 mo  Follow neuro-ophthalmologist recommendations.  Meanwhile, use sunglasses, artificial teardrops, avoid primary and secondary smoking  Agree with GI evaluation of diarrhea that now is unlikely to be explained by hyperthyroidism    RTC: 4 weeks    Total time (face-to-face and non-face-to face time):  30 min    Plan reviewed with the patient and agreed with plan.  All questions answered to patient's satisfaction.  Thank you kindly for allowing me to participate in the care plan for this patient.    Kamilah Danielson MD    CC:   Nanci Lucero M.D.

## 2023-11-09 ENCOUNTER — OFFICE VISIT (OUTPATIENT)
Dept: ENDOCRINOLOGY | Facility: MEDICAL CENTER | Age: 33
End: 2023-11-09
Attending: STUDENT IN AN ORGANIZED HEALTH CARE EDUCATION/TRAINING PROGRAM
Payer: COMMERCIAL

## 2023-11-09 VITALS
HEIGHT: 61 IN | HEART RATE: 102 BPM | OXYGEN SATURATION: 96 % | WEIGHT: 178 LBS | DIASTOLIC BLOOD PRESSURE: 80 MMHG | SYSTOLIC BLOOD PRESSURE: 116 MMHG | BODY MASS INDEX: 33.61 KG/M2

## 2023-11-09 DIAGNOSIS — E05.00 GRAVES DISEASE: ICD-10-CM

## 2023-11-09 PROCEDURE — 99214 OFFICE O/P EST MOD 30 MIN: CPT | Performed by: STUDENT IN AN ORGANIZED HEALTH CARE EDUCATION/TRAINING PROGRAM

## 2023-11-09 PROCEDURE — 3079F DIAST BP 80-89 MM HG: CPT | Performed by: STUDENT IN AN ORGANIZED HEALTH CARE EDUCATION/TRAINING PROGRAM

## 2023-11-09 PROCEDURE — 3074F SYST BP LT 130 MM HG: CPT | Performed by: STUDENT IN AN ORGANIZED HEALTH CARE EDUCATION/TRAINING PROGRAM

## 2023-11-09 PROCEDURE — 99211 OFF/OP EST MAY X REQ PHY/QHP: CPT | Performed by: STUDENT IN AN ORGANIZED HEALTH CARE EDUCATION/TRAINING PROGRAM

## 2023-11-09 ASSESSMENT — FIBROSIS 4 INDEX: FIB4 SCORE: 0.5

## 2023-12-04 ENCOUNTER — OFFICE VISIT (OUTPATIENT)
Dept: MEDICAL GROUP | Facility: MEDICAL CENTER | Age: 33
End: 2023-12-04
Payer: COMMERCIAL

## 2023-12-04 VITALS
HEIGHT: 61 IN | HEART RATE: 93 BPM | SYSTOLIC BLOOD PRESSURE: 102 MMHG | DIASTOLIC BLOOD PRESSURE: 70 MMHG | BODY MASS INDEX: 33.64 KG/M2 | WEIGHT: 178.2 LBS | OXYGEN SATURATION: 95 % | TEMPERATURE: 97.5 F

## 2023-12-04 DIAGNOSIS — M25.50 MULTIPLE JOINT PAIN: ICD-10-CM

## 2023-12-04 DIAGNOSIS — B35.1 ONYCHOMYCOSIS: ICD-10-CM

## 2023-12-04 DIAGNOSIS — Z11.3 SCREENING EXAMINATION FOR SEXUALLY TRANSMITTED DISEASE: ICD-10-CM

## 2023-12-04 PROCEDURE — 3078F DIAST BP <80 MM HG: CPT | Performed by: STUDENT IN AN ORGANIZED HEALTH CARE EDUCATION/TRAINING PROGRAM

## 2023-12-04 PROCEDURE — 3074F SYST BP LT 130 MM HG: CPT | Performed by: STUDENT IN AN ORGANIZED HEALTH CARE EDUCATION/TRAINING PROGRAM

## 2023-12-04 PROCEDURE — 99214 OFFICE O/P EST MOD 30 MIN: CPT | Performed by: STUDENT IN AN ORGANIZED HEALTH CARE EDUCATION/TRAINING PROGRAM

## 2023-12-04 RX ORDER — IBUPROFEN 600 MG/1
TABLET ORAL
COMMUNITY
End: 2023-12-19

## 2023-12-04 RX ORDER — TAMSULOSIN HYDROCHLORIDE 0.4 MG/1
CAPSULE ORAL
COMMUNITY
End: 2023-12-19

## 2023-12-04 RX ORDER — ONDANSETRON 4 MG/1
TABLET, ORALLY DISINTEGRATING ORAL
COMMUNITY
End: 2023-12-19

## 2023-12-04 RX ORDER — CICLOPIROX 80 MG/ML
SOLUTION TOPICAL
Qty: 6 ML | Refills: 3 | Status: SHIPPED | OUTPATIENT
Start: 2023-12-04

## 2023-12-04 RX ORDER — POLYETHYLENE GLYCOL-3350 AND ELECTROLYTES 236; 6.74; 5.86; 2.97; 22.74 G/274.31G; G/274.31G; G/274.31G; G/274.31G; G/274.31G
POWDER, FOR SOLUTION ORAL
COMMUNITY
End: 2023-12-19

## 2023-12-04 RX ORDER — KETOROLAC TROMETHAMINE 10 MG/1
TABLET, FILM COATED ORAL
COMMUNITY
End: 2023-12-19

## 2023-12-04 ASSESSMENT — FIBROSIS 4 INDEX: FIB4 SCORE: 0.5

## 2023-12-04 NOTE — PROGRESS NOTES
"Subjective:     CC:  toenail fungus, joint pains    HPI:   Deisy presents today with the following concerns    Left toe fungal issues  Patient reports that for few years she has noticed some discoloration and deformity in her left big toe needing and has been told she has fungal infection of the toe nail.  So far she has used intermittently over-the-counter fungal nail polish without much help.  Patient wants to start prescription medication.        Multiple joint pain  Patient with chronic history of intermittent episodes of multiple joint pains in knees, wrist and small joints.  Recently she has noticed increased frequency especially in winter cold season.  Reports stiffness in joints along with pain.    Patient was not sexually active for few months.  Now in a new relationship.  She wants to get STDs checked.      Health Maintenance: Completed    ROS:  ROS    Review of systems unremarkable except for concerns noted by patient or items listed.    Please see HPI for additional ROS.      Objective:     Exam:  /70 (BP Location: Left arm, Patient Position: Sitting, BP Cuff Size: Adult)   Pulse 93   Temp 36.4 °C (97.5 °F) (Temporal)   Ht 1.549 m (5' 1\")   Wt 80.8 kg (178 lb 3.2 oz)   LMP 11/28/2023   SpO2 95%   BMI 33.67 kg/m²  Body mass index is 33.67 kg/m².    Physical Exam  Constitutional:       Appearance: Normal appearance.   HENT:      Head: Normocephalic.   Eyes:      General: No scleral icterus.  Cardiovascular:      Rate and Rhythm: Normal rate and regular rhythm.      Pulses: Normal pulses.      Heart sounds: Normal heart sounds.   Pulmonary:      Effort: Pulmonary effort is normal.      Breath sounds: Normal breath sounds.   Musculoskeletal:      Right lower leg: No edema.      Left lower leg: No edema.   Skin:     General: Skin is warm.   Neurological:      Mental Status: She is alert and oriented to person, place, and time.   Psychiatric:         Mood and Affect: Mood normal.         " Behavior: Behavior normal.             Labs: Reviewed    Assessment & Plan:     33 y.o. female with the following -     1. Onychomycosis  Chronic, unstable  Discussed risk benefits of oral antifungals.  Shared decision made to start with a topical medication.  Plan  Recommended hygiene, moisturizing to avoid cracks and cuts.  Start topical ciclopirox once daily for 9 to 12 months.  - ciclopirox (PENLAC) 8 % solution; Apply evenly over entire nail plate nightly to all affected nails.  Dispense: 6 mL; Refill: 3    2. Screening examination for sexually transmitted disease  Routine screening testing.  No high risk behavior  Counseling provided for safe sex and contraception  Recommended barrier contraception to avoid STDs  - HIV AG/AB Combo Assay Screening; Future  - T.Pallidum AB ANDREA (Screening); Future  - Trichomonas Vaginalis by TMA  - Chlamydia/GC, PCR (Urine); Future    3. Multiple joint pain  Chronic, unclear etiology  Given her history of Graves' disease, it is reasonable to check for autoimmune panel to rule out rheumatoid arthritis  - MEGHAN REFLEXIVE PROFILE; Future  - URIC ACID; Future  - CCP  - Sed Rate; Future  - CRP QUANTITIVE (NON-CARDIAC); Future  - RHEUMATOID ARTHRITIS FACTOR; Future      Updates  Patient has colonoscopy planned with GI next month for chronic intermittent diarrhea concerns.       Return in about 3 months (around 3/4/2024) for labs f/u.    Please note that this dictation was created using voice recognition software. I have made every reasonable attempt to correct obvious errors, but I expect that there are errors of grammar and possibly content that I did not discover before finalizing the note.

## 2023-12-08 ENCOUNTER — HOSPITAL ENCOUNTER (OUTPATIENT)
Dept: LAB | Facility: MEDICAL CENTER | Age: 33
End: 2023-12-08
Attending: STUDENT IN AN ORGANIZED HEALTH CARE EDUCATION/TRAINING PROGRAM
Payer: COMMERCIAL

## 2023-12-08 DIAGNOSIS — Z11.3 SCREENING EXAMINATION FOR SEXUALLY TRANSMITTED DISEASE: ICD-10-CM

## 2023-12-08 DIAGNOSIS — M25.50 MULTIPLE JOINT PAIN: ICD-10-CM

## 2023-12-08 LAB
CRP SERPL HS-MCNC: 1.22 MG/DL (ref 0–0.75)
ERYTHROCYTE [SEDIMENTATION RATE] IN BLOOD BY WESTERGREN METHOD: 5 MM/HOUR (ref 0–25)
HIV 1+2 AB+HIV1 P24 AG SERPL QL IA: NORMAL
RHEUMATOID FACT SER IA-ACNC: <10 IU/ML (ref 0–14)
T PALLIDUM AB SER QL IA: NORMAL
URATE SERPL-MCNC: 3.8 MG/DL (ref 1.9–8.2)

## 2023-12-08 PROCEDURE — 86235 NUCLEAR ANTIGEN ANTIBODY: CPT | Mod: 91

## 2023-12-08 PROCEDURE — 86038 ANTINUCLEAR ANTIBODIES: CPT

## 2023-12-08 PROCEDURE — 86200 CCP ANTIBODY: CPT

## 2023-12-08 PROCEDURE — 87491 CHLMYD TRACH DNA AMP PROBE: CPT

## 2023-12-08 PROCEDURE — 85652 RBC SED RATE AUTOMATED: CPT

## 2023-12-08 PROCEDURE — 84550 ASSAY OF BLOOD/URIC ACID: CPT

## 2023-12-08 PROCEDURE — 87591 N.GONORRHOEAE DNA AMP PROB: CPT

## 2023-12-08 PROCEDURE — 36415 COLL VENOUS BLD VENIPUNCTURE: CPT

## 2023-12-08 PROCEDURE — 86140 C-REACTIVE PROTEIN: CPT

## 2023-12-08 PROCEDURE — 86039 ANTINUCLEAR ANTIBODIES (ANA): CPT

## 2023-12-08 PROCEDURE — 86225 DNA ANTIBODY NATIVE: CPT

## 2023-12-08 PROCEDURE — 86431 RHEUMATOID FACTOR QUANT: CPT

## 2023-12-08 PROCEDURE — 87389 HIV-1 AG W/HIV-1&-2 AB AG IA: CPT

## 2023-12-08 PROCEDURE — 86780 TREPONEMA PALLIDUM: CPT

## 2023-12-09 LAB
C TRACH DNA SPEC QL NAA+PROBE: NEGATIVE
N GONORRHOEA DNA SPEC QL NAA+PROBE: NEGATIVE
SPECIMEN SOURCE: NORMAL

## 2023-12-10 LAB — NUCLEAR IGG SER QL IA: DETECTED

## 2023-12-11 ENCOUNTER — HOSPITAL ENCOUNTER (OUTPATIENT)
Facility: MEDICAL CENTER | Age: 33
End: 2023-12-11
Attending: INTERNAL MEDICINE
Payer: COMMERCIAL

## 2023-12-11 LAB
G LAMBLIA+C PARVUM AG STL QL RAPID: NORMAL
SIGNIFICANT IND 70042: NORMAL
SITE SITE: NORMAL
SOURCE SOURCE: NORMAL

## 2023-12-11 PROCEDURE — 83993 ASSAY FOR CALPROTECTIN FECAL: CPT

## 2023-12-11 PROCEDURE — 87329 GIARDIA AG IA: CPT

## 2023-12-11 PROCEDURE — 87328 CRYPTOSPORIDIUM AG IA: CPT

## 2023-12-11 PROCEDURE — 82653 EL-1 FECAL QUANTITATIVE: CPT

## 2023-12-11 PROCEDURE — 82705 FATS/LIPIDS FECES QUAL: CPT

## 2023-12-13 LAB
ANA INTERPRETIVE COMMENT Q5143: ABNORMAL
ANA PATTERN Q5144: ABNORMAL
ANA TITER Q5145: ABNORMAL
ANTINUCLEAR ANTIBODY (ANA), HEP-2, IGG Q5142: DETECTED
DSDNA AB TITR SER CLIF: NORMAL {TITER}
ENA JO1 AB TITR SER: 3 AU/ML (ref 0–40)
ENA SCL70 IGG SER QL: 8 AU/ML (ref 0–40)
ENA SM IGG SER-ACNC: 5 AU/ML (ref 0–40)
ENA SS-B IGG SER IA-ACNC: 1 AU/ML (ref 0–40)
FAT STL QL: NORMAL
NEUTRAL FAT STL QL: NORMAL
SSA52 R0ENA AB IGG Q0420: 4 AU/ML (ref 0–40)
SSA60 R0ENA AB IGG Q0419: 5 AU/ML (ref 0–40)

## 2023-12-14 LAB
CALPROTECTIN STL-MCNT: 18 UG/G
ELASTASE PANC STL-MCNT: >800 UG/G
U1 SNRNP IGG SER QL: 4 UNITS (ref 0–19)

## 2023-12-19 ENCOUNTER — TELEMEDICINE (OUTPATIENT)
Dept: MEDICAL GROUP | Facility: MEDICAL CENTER | Age: 33
End: 2023-12-19
Payer: COMMERCIAL

## 2023-12-19 VITALS — BODY MASS INDEX: 33.61 KG/M2 | WEIGHT: 178 LBS | HEIGHT: 61 IN

## 2023-12-19 DIAGNOSIS — F41.9 ANXIETY: ICD-10-CM

## 2023-12-19 DIAGNOSIS — R76.8 ANA POSITIVE: ICD-10-CM

## 2023-12-19 DIAGNOSIS — M25.50 MULTIPLE JOINT PAIN: ICD-10-CM

## 2023-12-19 PROCEDURE — 99214 OFFICE O/P EST MOD 30 MIN: CPT | Mod: 95 | Performed by: STUDENT IN AN ORGANIZED HEALTH CARE EDUCATION/TRAINING PROGRAM

## 2023-12-19 ASSESSMENT — ENCOUNTER SYMPTOMS
NAUSEA: 0
DEPRESSION: 0
WHEEZING: 0
BLOOD IN STOOL: 0
SHORTNESS OF BREATH: 0
FALLS: 0
FEVER: 0
HEARTBURN: 0
BLURRED VISION: 0
COUGH: 0
MYALGIAS: 0
PALPITATIONS: 0
DIZZINESS: 0
HEADACHES: 0
SORE THROAT: 0

## 2023-12-19 ASSESSMENT — FIBROSIS 4 INDEX: FIB4 SCORE: 0.5

## 2023-12-19 NOTE — PROGRESS NOTES
Virtual Visit: Established Patient   This visit was conducted via Zoom using secure and encrypted videoconferencing technology.   The patient was in their home in the [facility].    The patient's identity was confirmed and verbal consent was obtained for this virtual visit.    Subjective:   CC:   Chief Complaint   Patient presents with    Lab Results     Follow up      Deisy Gilbert is a 33 y.o. female presenting for lab follow-up    Lab results reviewed and discussed with patient today     She has elevated CRP which is inflammation marker.  She also have positive MEGHAN titers.  Given her history of multiple joint pains, it is reasonable to proceed further with referral to rheumatology for further evaluation and treatment.    Patient had colonoscopy done last month which was normal.  She also had an EGD which was normal in appearance as well.  She has biopsies done which are still pending results    Patient is following up with endocrinology for hyperthyroidism.  All other chronic medical problems are stable    ROS   Review of Systems   Constitutional:  Negative for fever and malaise/fatigue.   HENT:  Negative for congestion and sore throat.    Eyes:  Negative for blurred vision.   Respiratory:  Negative for cough, shortness of breath and wheezing.    Cardiovascular:  Negative for chest pain, palpitations and leg swelling.   Gastrointestinal:  Negative for blood in stool, heartburn and nausea.   Genitourinary:  Negative for dysuria and urgency.   Musculoskeletal:  Positive for joint pain. Negative for falls and myalgias.   Neurological:  Negative for dizziness and headaches.   Psychiatric/Behavioral:  Negative for depression and suicidal ideas.                Current medicines (including changes today)  Current Outpatient Medications   Medication Sig Dispense Refill    ciclopirox (PENLAC) 8 % solution Apply evenly over entire nail plate nightly to all affected nails. 6 mL 3    norgestrel-ethinyl estradiol  "(LOW-OGESTREL) 0.3-30 MG-MCG Tab Take 1 Tablet by mouth every day. 84 Tablet 4    dicyclomine (BENTYL) 10 MG Cap Take 1 Capsule by mouth 3 times a day as needed (colic pain). 60 Capsule 0    Potassium Citrate 15 MEQ (1620 MG) Tab CR       Probiotic Product (PRO-BIOTIC BLEND PO) Take  by mouth.      methimazole (TAPAZOLE) 10 MG Tab Take 1 Tablet by mouth 3 times a day. (Patient taking differently: Take 10 mg by mouth every day.) 90 Tablet 2     No current facility-administered medications for this visit.       Patient Active Problem List    Diagnosis Date Noted    Thyroid eye disease 10/17/2023    Ptosis of right eyelid 10/17/2023    Palpitations 08/30/2023    Graves disease 08/30/2023    Hyperthyroidism 08/30/2023    Diarrhea 08/23/2023    Anxiety 08/23/2023    Recurrent kidney stones 08/22/2023    H/O: suicide attempt 08/22/2023    Moderate episode of recurrent major depressive disorder (HCC) 08/23/2022    Onychomycosis 08/22/2017    Generalized anxiety disorder 06/27/2017    Acquired complex renal cyst 10/03/2016    Dysmenorrhea 02/02/2016        Objective:   Ht 1.549 m (5' 1\") Comment: Pt reported  Wt 80.7 kg (178 lb) Comment: Pt reported  LMP 11/28/2023   BMI 33.63 kg/m²     Physical Exam:  Constitutional: Alert, no distress, well-groomed.  Skin: No rashes in visible areas.  Eye: Round. Conjunctiva clear, lids normal. No icterus.   ENMT: Lips pink without lesions, good dentition, moist mucous membranes. Phonation normal.  Neck: No masses, no thyromegaly. Moves freely without pain.  Respiratory: Unlabored respiratory effort, no cough or audible wheeze  Psych: Alert and oriented x3, normal affect and mood.     Assessment and Plan:   The following treatment plan was discussed:     1. Multiple joint pain    Patient with history of intermittent pain in multiple joints including Graves' disease autoimmune workup was done.  She has positive MEGHAN titers and elevated CRP levels.  I would refer her to rheumatology for " further evaluation and treatment  - Referral to Rheumatology    2. MEGHAN positive  - Referral to Rheumatology    3. Anxiety  Chronic, uncontrolled  Patient is doing much better with the treatment of Graves' disease with methimazole  She has propranolol 10 mg twice daily as needed    Follow-up: Return in about 6 months (around 6/19/2024) for preventive wellness.

## 2023-12-21 DIAGNOSIS — E05.00 GRAVES DISEASE: ICD-10-CM

## 2023-12-21 DIAGNOSIS — E05.90 HYPERTHYROIDISM: ICD-10-CM

## 2023-12-21 RX ORDER — METHIMAZOLE 10 MG/1
10 TABLET ORAL 3 TIMES DAILY
Qty: 270 TABLET | Refills: 1 | Status: SHIPPED | OUTPATIENT
Start: 2023-12-21 | End: 2024-02-09

## 2023-12-25 LAB — CCP IGA+IGG SERPL IA-ACNC: 16 UNITS (ref 0–19)

## 2024-01-28 ENCOUNTER — OFFICE VISIT (OUTPATIENT)
Dept: URGENT CARE | Facility: CLINIC | Age: 34
End: 2024-01-28
Payer: COMMERCIAL

## 2024-01-28 VITALS
DIASTOLIC BLOOD PRESSURE: 80 MMHG | TEMPERATURE: 97.8 F | WEIGHT: 178.4 LBS | HEART RATE: 96 BPM | RESPIRATION RATE: 18 BRPM | BODY MASS INDEX: 33.68 KG/M2 | HEIGHT: 61 IN | OXYGEN SATURATION: 96 % | SYSTOLIC BLOOD PRESSURE: 120 MMHG

## 2024-01-28 DIAGNOSIS — R09.82 POST-NASAL DRAINAGE: ICD-10-CM

## 2024-01-28 DIAGNOSIS — J06.9 VIRAL UPPER RESPIRATORY TRACT INFECTION: ICD-10-CM

## 2024-01-28 PROCEDURE — 99213 OFFICE O/P EST LOW 20 MIN: CPT

## 2024-01-28 PROCEDURE — 3074F SYST BP LT 130 MM HG: CPT

## 2024-01-28 PROCEDURE — 3079F DIAST BP 80-89 MM HG: CPT

## 2024-01-28 RX ORDER — FLUTICASONE PROPIONATE 50 MCG
1 SPRAY, SUSPENSION (ML) NASAL DAILY
Qty: 16 G | Refills: 0 | Status: SHIPPED | OUTPATIENT
Start: 2024-01-28 | End: 2024-03-27

## 2024-01-28 ASSESSMENT — ENCOUNTER SYMPTOMS
ABDOMINAL PAIN: 0
COUGH: 0
CHILLS: 0
SINUS PAIN: 0
FEVER: 0
RHINORRHEA: 0
HEADACHES: 0

## 2024-01-28 ASSESSMENT — FIBROSIS 4 INDEX: FIB4 SCORE: 0.5

## 2024-01-28 NOTE — PATIENT INSTRUCTIONS
"As we discussed your clinical condition would benefit from over-the-counter remedies:    Congestion:    Nasal rinsing with saline nasal spray or salt water (e.g., neti pot) can help relieve nasal dryness.    Breathe Right nasal strips at night for nasal congestion    Ponaris nasal emmollient for nasal congestion, dryness, and inflammation (do not use with iodine sensitivity)    Cool mist humidification, chest rubs, warm tea with honey, increased fluid intake to thin secretions    SALINE IRRIGATION/SPRAY 2 to 3 times per day    You may consider using a saline nasal irrigation (such as a \"Neti pot\" or similar device using sterile water, NOT tap water) or OTC saline nasal spray. Common brands include Brian Med, Sinex, [place] Nasal. May use short term nasal sprays, such as oxymetazoline (Afrin) to help relieve nasal discomfort, congestion, and/or pressure. Decongestant sprays should not be used longer than three consecutive days.     Over the counter medications:    OTC second generation antihistamine daily (cetirizine, desloratadine, fexofenadine, levocetirizine, and loratadine) daily IN COMBINATION WITH:    FLONASE (once per day) x 2 weeks     You may consider intranasal steroids such as fluticasone (brand name Flonase), (other options include Nasonex, Rhinocort, Nasacort) daily; continue for at least 2-3 weeks. Any generic should work as well but may cause slightly more nasal irritation. Please take according to package directions.  This steroid will help reduce inflammation of your sinuses.  This is the number one medication to help with seasonal allergies and treat nasal inflammation.  Cost: around $8 at [facility] for the generic fluticasone [facility] product (as of 5/20).    SUDAFED (low dose to see if tolerated) daily x 4-5 days    You may consider over-the-counter Sudafed (pseudoephedrine) to act as a decongestant to improve your breathing through your nose.  Please do not use this medication if you already have known " high blood pressure.  Please take according to package directions and note that this has a stimulating effect and should not be taken late in the day.  There is a low dose 12 hour formulation and a higher dose 24 hour formulation.  Start with a low dose to make sure you tolerate the medication.  Do not take late in the day as it may interfere with sleep.   Cost: Around $6 for the generic [facility] branded product at a 10mg dose (as of 2/2023).      SORE THROAT:    Symptom management for a sore throat includes:     Sipping cold or warm beverages (eg, tea with honey or lemon)     Eating cold or frozen desserts (eg, ice cream, popsicles)    Sucking on ice    Sucking on hard candy, CEPACOL lozenges, or medicated throat sprays. These contain the active ingredient benzocaine which is an anesthetic agent.  It helps relieve the symptoms of sore throat and may diminish your cough reflex.Please note that taking too frequently may cause harm - pay attention to package directions.    Gargling with warm salt water -  ¼ to ½ teaspoon of salt per 8 ounces (approximately 240 mL) of warm water.  Cool mist humidification  OTC Tylenol/ibuprofen PRN for pain/fever     PAIN OR FEVER:    NSAIDs  You may take Ibuprofen (brand name Motrin or Advil) 600 to 800 mg may be taken up to three times per day taken with food (do not exceed 2400 mg per day).  If you prefer you may consider Naproxen (brand name Naprosyn or Aleve) around 500 mg twice per day with food (do not exceed 1200 mg per day). Please do not take if you have known stomach ulcers or known kidney disease.     TYLENOL  You may take Tylenol according to package directions (1000 mg every 8 hours not to exceed 3000 mg per day).  Please do not consume with any alcohol products, or if you have known or suspected liver disease. These will help reduce inflammation, help with pain control, and can reduce fevers.

## 2024-01-28 NOTE — PROGRESS NOTES
Subjective:   Deisy Gilbert is a very pleasant 33 y.o. female who presents for:    Chief Complaint   Patient presents with    Otalgia     Right ear ach, this morning     Other     Nasal drip, started Wednesday        HPI:    URI   Episode onset: four days ago. There has been no fever. Associated symptoms include congestion. Pertinent negatives include no abdominal pain, coughing, ear pain, headaches, rhinorrhea or sinus pain. Associated symptoms comments: Post nasal drainage that is causing a sore throat, bilateral ear fulllness. She has tried nothing for the symptoms.       ROS:    Review of Systems   Constitutional:  Negative for chills, fever and malaise/fatigue.   HENT:  Positive for congestion. Negative for ear pain, rhinorrhea and sinus pain.    Respiratory:  Negative for cough.    Gastrointestinal:  Negative for abdominal pain.   Neurological:  Negative for headaches.   All other systems reviewed and are negative.      Medications:      Current Outpatient Medications   Medication Sig    methimazole (TAPAZOLE) 10 MG Tab TAKE 1 TABLET BY MOUTH THREE TIMES A DAY    ciclopirox (PENLAC) 8 % solution Apply evenly over entire nail plate nightly to all affected nails.    norgestrel-ethinyl estradiol (LOW-OGESTREL) 0.3-30 MG-MCG Tab Take 1 Tablet by mouth every day.    dicyclomine (BENTYL) 10 MG Cap Take 1 Capsule by mouth 3 times a day as needed (colic pain).    Potassium Citrate 15 MEQ (1620 MG) Tab CR     Probiotic Product (PRO-BIOTIC BLEND PO) Take  by mouth.       Allergies:     Allergies   Allergen Reactions    Lactose      Other reaction(s): GI Intolerance       Problem List:     Patient Active Problem List   Diagnosis    Moderate episode of recurrent major depressive disorder (HCC)    Recurrent kidney stones    H/O: suicide attempt    Diarrhea    Anxiety    Palpitations    Graves disease    Hyperthyroidism    Acquired complex renal cyst    Dysmenorrhea    Generalized anxiety disorder    Onychomycosis     Thyroid eye disease    Ptosis of right eyelid       Surgical History:    Past Surgical History:   Procedure Laterality Date    CYSTOSCOPY STENT PLACEMENT  2017       Past Social Hx:     Social History     Socioeconomic History    Marital status: Single    Highest education level: Bachelor's degree (e.g., BA, AB, BS)   Tobacco Use    Smoking status: Former     Types: Cigarettes    Smokeless tobacco: Never   Vaping Use    Vaping Use: Never used   Substance and Sexual Activity    Alcohol use: Not Currently    Drug use: Not Currently   Social History Narrative    Senior tech artist      Social Determinants of Health     Financial Resource Strain: Low Risk  (8/21/2023)    Overall Financial Resource Strain (CARDIA)     Difficulty of Paying Living Expenses: Not very hard   Food Insecurity: No Food Insecurity (8/21/2023)    Hunger Vital Sign     Worried About Running Out of Food in the Last Year: Never true     Ran Out of Food in the Last Year: Never true   Transportation Needs: No Transportation Needs (8/21/2023)    PRAPARE - Transportation     Lack of Transportation (Medical): No     Lack of Transportation (Non-Medical): No   Physical Activity: Insufficiently Active (8/21/2023)    Exercise Vital Sign     Days of Exercise per Week: 3 days     Minutes of Exercise per Session: 30 min   Stress: Stress Concern Present (8/21/2023)    [employer] - Occupational Stress Questionnaire     Feeling of Stress : To some extent   Social Connections: Socially Isolated (8/21/2023)    Social Connection and Isolation Panel [NHANES]     Frequency of Communication with Friends and Family: More than three times a week     Frequency of Social Gatherings with Friends and Family: Patient refused     Attends Zoroastrian Services: Never     Active Member of Clubs or Organizations: No     Attends Club or Organization Meetings: Never     Marital Status: Never    Housing Stability: Low Risk  (8/21/2023)    Housing  Stability Vital Sign     Unable to Pay for Housing in the Last Year: No     Number of Places Lived in the Last Year: 1     Unstable Housing in the Last Year: No        Past Family Hx:      Family History   Problem Relation Age of Onset    Hypertension Mother     Eczema Father     No Known Problems Brother     Breast Cancer Paternal Aunt     Diabetes Paternal Uncle     Hypertension Maternal Grandmother        Problem list, medications, and allergies reviewed by myself today in Epic.     Objective:     Vitals:    01/28/24 1313   BP: 120/80   Pulse: 96   Resp: 18   Temp: 36.6 °C (97.8 °F)   SpO2: 96%       Physical Exam  Vitals reviewed.   Constitutional:       General: She is not in acute distress.     Appearance: Normal appearance. She is not ill-appearing, toxic-appearing or diaphoretic.   HENT:      Head: Normocephalic and atraumatic.      Right Ear: Tympanic membrane, ear canal and external ear normal.      Left Ear: Tympanic membrane, ear canal and external ear normal.      Nose: Congestion present. No rhinorrhea.      Mouth/Throat:      Mouth: Mucous membranes are moist.      Pharynx: Oropharynx is clear.   Eyes:      Extraocular Movements: Extraocular movements intact.      Conjunctiva/sclera: Conjunctivae normal.      Pupils: Pupils are equal, round, and reactive to light.   Cardiovascular:      Rate and Rhythm: Normal rate and regular rhythm.      Pulses: Normal pulses.      Heart sounds: Normal heart sounds.   Pulmonary:      Effort: Pulmonary effort is normal.      Breath sounds: Normal breath sounds.   Abdominal:      General: Abdomen is flat.      Palpations: Abdomen is soft.   Musculoskeletal:         General: Normal range of motion.      Cervical back: Normal range of motion.   Skin:     General: Skin is warm and dry.   Neurological:      General: No focal deficit present.      Mental Status: She is alert and oriented to person, place, and time.   Psychiatric:         Mood and Affect: Mood normal.          Behavior: Behavior normal.         Thought Content: Thought content normal.       Assessment/Plan:     Diagnosis and associated orders:     1. Viral upper respiratory tract infection    2. Post-nasal drainage  - fluticasone (FLONASE) 50 MCG/ACT nasal spray; Administer 1 Spray into affected nostril(S) every day.  Dispense: 16 g; Refill: 0           Comments/MDM:     Based on patient's symptoms, symptom duration, and physical exam findings, it was discussed with patient that the likely etiology of the illness is viral.   Prescription for intranasal fluticasone sent to patient's preferred pharmacy for the symptomatic treatment of post nasal drainage   No evidence of lower respiratory involvement on exam today      Recommend symptomatic care:  OTC second generation antihistamine daily (cetirizine, desloratadine, fexofenadine, levocetirizine, and loratadine) daily IN COMBINATION WITH:  OTC decongestant (Sudafed - Pseudoephedrine) unless contraindication in place, such as hypertension, CAD, narrow-angle glaucoma. Use with caution if the patient has a history of cardiac dysrhythmias, hyperthyroidism, DM, prostatic hypertrophy, and glaucoma should use with caution.  Intranasal fluticasone (Flonase) daily  Nasal saline rinses 2-3 times a day   May use short term nasal sprays, such as oxymetazoline (Afrin) to help relieve nasal discomfort, congestion, and/or pressure. Decongestant sprays should not be used longer than three consecutive days.   Nasal rinsing with saline nasal spray or salt water (e.g., neti pot) can help relieve nasal dryness.  Breathe Right nasal strips at night for nasal congestion  Ponaris nasal emmollient for nasal congestion, dryness, and inflammation (do not use with iodine sensitivity)  Cool mist humidification, chest rubs, warm tea with honey, increased fluid intake to thin secretions  Tylenol or ibuprofen as needed for fever control, body aches, and headaches.  Return precautions discussed with  patient.  Patient verbalized understanding and is in agreement with this plan of care.           All questions answered. Patient verbalized understanding and is in agreement with this plan of care.     If symptoms are worsening or not improving in 3-5 days, follow-up with PCP or return to UC. Differential diagnosis, natural history, and supportive care discussed. AVS handout given and reviewed with patient. Patient educated on red flags and when to seek treatment back in ED or UC.     I personally reviewed prior external notes and test results pertinent to today's visit.  I have independently reviewed and interpreted all diagnostics ordered during this urgent care visit.     This dictation has been created using voice recognition software. The accuracy of the dictation is limited by the abilities of the software. I expect there may be some errors of grammar and possibly content. I made every attempt to manually correct the errors within my dictation. However, errors related to voice recognition software may still exist and should be interpreted within the appropriate context.    This note was electronically signed by DANYA Jean

## 2024-02-05 ENCOUNTER — HOSPITAL ENCOUNTER (OUTPATIENT)
Dept: LAB | Facility: MEDICAL CENTER | Age: 34
End: 2024-02-05
Attending: STUDENT IN AN ORGANIZED HEALTH CARE EDUCATION/TRAINING PROGRAM
Payer: COMMERCIAL

## 2024-02-05 DIAGNOSIS — E05.00 GRAVES DISEASE: ICD-10-CM

## 2024-02-05 LAB
T4 FREE SERPL-MCNC: 1.22 NG/DL (ref 0.93–1.7)
TSH SERPL DL<=0.005 MIU/L-ACNC: <0.005 UIU/ML (ref 0.38–5.33)

## 2024-02-05 PROCEDURE — 84439 ASSAY OF FREE THYROXINE: CPT

## 2024-02-05 PROCEDURE — 84443 ASSAY THYROID STIM HORMONE: CPT

## 2024-02-05 PROCEDURE — 36415 COLL VENOUS BLD VENIPUNCTURE: CPT

## 2024-02-07 ENCOUNTER — HOSPITAL ENCOUNTER (OUTPATIENT)
Dept: LAB | Facility: MEDICAL CENTER | Age: 34
End: 2024-02-07
Attending: STUDENT IN AN ORGANIZED HEALTH CARE EDUCATION/TRAINING PROGRAM
Payer: COMMERCIAL

## 2024-02-07 ENCOUNTER — HOSPITAL ENCOUNTER (OUTPATIENT)
Dept: RADIOLOGY | Facility: MEDICAL CENTER | Age: 34
End: 2024-02-07
Attending: STUDENT IN AN ORGANIZED HEALTH CARE EDUCATION/TRAINING PROGRAM
Payer: COMMERCIAL

## 2024-02-07 ENCOUNTER — OFFICE VISIT (OUTPATIENT)
Dept: RHEUMATOLOGY | Facility: MEDICAL CENTER | Age: 34
End: 2024-02-07
Attending: STUDENT IN AN ORGANIZED HEALTH CARE EDUCATION/TRAINING PROGRAM
Payer: COMMERCIAL

## 2024-02-07 VITALS
TEMPERATURE: 98.7 F | HEART RATE: 104 BPM | OXYGEN SATURATION: 97 % | SYSTOLIC BLOOD PRESSURE: 124 MMHG | WEIGHT: 182 LBS | DIASTOLIC BLOOD PRESSURE: 80 MMHG | BODY MASS INDEX: 34.36 KG/M2 | HEIGHT: 61 IN

## 2024-02-07 DIAGNOSIS — R76.8 SEROLOGIC AUTOIMMUNITY: ICD-10-CM

## 2024-02-07 DIAGNOSIS — E05.00 GRAVES DISEASE: ICD-10-CM

## 2024-02-07 DIAGNOSIS — M25.50 ARTHRALGIA, UNSPECIFIED JOINT: ICD-10-CM

## 2024-02-07 LAB
C3 SERPL-MCNC: 139.2 MG/DL (ref 87–200)
C4 SERPL-MCNC: 27.3 MG/DL (ref 19–52)
THYROPEROXIDASE AB SERPL-ACNC: 246 IU/ML (ref 0–9)

## 2024-02-07 PROCEDURE — 3079F DIAST BP 80-89 MM HG: CPT | Performed by: STUDENT IN AN ORGANIZED HEALTH CARE EDUCATION/TRAINING PROGRAM

## 2024-02-07 PROCEDURE — 86800 THYROGLOBULIN ANTIBODY: CPT

## 2024-02-07 PROCEDURE — 86376 MICROSOMAL ANTIBODY EACH: CPT

## 2024-02-07 PROCEDURE — 3074F SYST BP LT 130 MM HG: CPT | Performed by: STUDENT IN AN ORGANIZED HEALTH CARE EDUCATION/TRAINING PROGRAM

## 2024-02-07 PROCEDURE — 83516 IMMUNOASSAY NONANTIBODY: CPT

## 2024-02-07 PROCEDURE — 86160 COMPLEMENT ANTIGEN: CPT | Mod: 91

## 2024-02-07 PROCEDURE — 99204 OFFICE O/P NEW MOD 45 MIN: CPT | Performed by: STUDENT IN AN ORGANIZED HEALTH CARE EDUCATION/TRAINING PROGRAM

## 2024-02-07 PROCEDURE — 99212 OFFICE O/P EST SF 10 MIN: CPT | Performed by: STUDENT IN AN ORGANIZED HEALTH CARE EDUCATION/TRAINING PROGRAM

## 2024-02-07 PROCEDURE — 73565 X-RAY EXAM OF KNEES: CPT

## 2024-02-07 PROCEDURE — 36415 COLL VENOUS BLD VENIPUNCTURE: CPT

## 2024-02-07 PROCEDURE — 86162 COMPLEMENT TOTAL (CH50): CPT

## 2024-02-07 ASSESSMENT — FIBROSIS 4 INDEX: FIB4 SCORE: 0.5

## 2024-02-07 NOTE — PROGRESS NOTES
[facility] RHEUMATOLOGY  [address] [facility] [address], [address], [place] [address]  Phone: [phone] ? Fax: [phone]  [facility]/Health-Services/Rheumatology    NEW CONSULT VISIT NOTE      DATE OF SERVICE: 02/07/2024         Subjective     REFERRING PRACTITIONER:  Patria Lucero M.D.  [address]  [place] [address]    PATIENT IDENTIFICATION:  Deisy Gilbert  [address]  [place] [address]    YOB: 1990    MEDICAL RECORD NUMBER: [id number]         CHIEF COMPLAINT:   Chief Complaint   Patient presents with    New Patient     Multiple joint pain       HISTORY OF PRESENT ILLNESS:  Deisy Gilbert is a 33 y.o. female with pertinent history notable for Graves' disease with hyperthyroidism diagnosed in 8/2023, thyroid eye disease, recurrent kidney stones s/p laser lithotripsies (in 2017 and 2023), anxiety and depression among other comorbidities. Presents for rheumatologic evaluation of arthralgia in the setting of positive MEGHAN that raised concern for connective tissue disease. Reports onset of symptoms sometime following her diagnosis of Graves' disease in 8/2023 with variable course including joint/muscle pain mostly in her knees (right worse than left), and to a lesser extent ankles and wrists, worsening pain on physical activity, fatigue with body aches, and dry eyes with pain/redness (attributed to her thyroid eye disease) among other symptoms. Reports other aspects of her symptoms and medical history as noted on the questionnaire scanned under media tab.    Pertinent positive labs: Positive anti-TSHR 9.72 (in 8/2023); positive MEGHAN 1:320 homogenous pattern with negative reflex panel (in 12/2023); elevated CRP 1.22<1.11 with normal ESR 5<8 (in 12/2023<8/2023); low TSH 0.005 with normal FT4 and FT3 (in 2/2024).    Pertinent negative labs: Negative anti-tTG (in 9/2023), anti-AChR (in 10/2023), RF, anti-CCP, uric acid, fecal calprotectin, chlamydia, gonorrhea, syphilis, and  HIV (in 12/2023), HCV, eGFR, creatinine, LFTs, and CBC (in 8/2023).    US thyroid imaging (in 10/2023): Mildly enlarged heterogeneous thyroid gland with hyperemia consistent with Graves' disease.     REVIEW OF SYSTEMS:  Except as noted in the history above, relevant review of systems with emphasis on autoimmune rheumatic diseases was otherwise negative.      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) 02/07/2024    Arthralgia 02/07/2024    Thyroid eye disease 10/17/2023    Ptosis of right eyelid 10/17/2023    Palpitations 08/30/2023    Graves disease 08/30/2023    Hyperthyroidism 08/30/2023    Diarrhea 08/23/2023    Anxiety 08/23/2023    Recurrent kidney stones 08/22/2023    H/O: suicide attempt 08/22/2023    Moderate episode of recurrent major depressive disorder (HCC) 08/23/2022    Onychomycosis 08/22/2017    Generalized anxiety disorder 06/27/2017    Acquired complex renal cyst 10/03/2016    Dysmenorrhea 02/02/2016       PAST MEDICAL HISTORY:  Past Medical History:   Diagnosis Date    Graves disease 08/18/2023    History of kidney stones        PAST SURGICAL HISTORY:  Past Surgical History:   Procedure Laterality Date    CYSTOSCOPY STENT PLACEMENT  2017       MEDICATIONS:  Current Outpatient Medications   Medication Sig    fluticasone (FLONASE) 50 MCG/ACT nasal spray Administer 1 Spray into affected nostril(S) every day.    methimazole (TAPAZOLE) 10 MG Tab TAKE 1 TABLET BY MOUTH THREE TIMES A DAY    ciclopirox (PENLAC) 8 % solution Apply evenly over entire nail plate nightly to all affected nails.    norgestrel-ethinyl estradiol (LOW-OGESTREL) 0.3-30 MG-MCG Tab Take 1 Tablet by mouth every day.    dicyclomine (BENTYL) 10 MG Cap Take 1 Capsule by mouth 3 times a day as needed (colic pain).    Potassium Citrate 15 MEQ (1620 MG) Tab CR     Probiotic Product (PRO-BIOTIC BLEND PO) Take  by mouth.       ALLERGIES:   Allergies    Allergen Reactions    Lactose      Other reaction(s): GI Intolerance       IMMUNIZATIONS:  Immunization History   Administered Date(s) Administered    9VHPV VACCINE 2-3 DOSE IM (GARDASIL 9) 08/23/2016, 08/22/2017, 09/22/2023    Influenza Vac Subunit Quad Inj (Pf) 09/12/2016, 11/01/2018, 09/17/2020    Influenza Vaccine Adult HD 09/22/2022, 09/22/2022    Influenza Vaccine Quad Inj (Pf) 08/22/2017, 11/21/2019, 09/20/2021, 09/22/2023    PFIZER PURPLE CAP SARS-COV-2 VACCINATION (12+) 04/05/2021, 04/26/2021, 12/15/2021, 11/10/2022    Tdap Vaccine 08/23/2016, 08/23/2022       SOCIAL HISTORY:   Social History     Socioeconomic History    Marital status: Single    Highest education level: Bachelor's degree (e.g., BA, AB, BS)   Tobacco Use    Smoking status: Former     Types: Cigarettes    Smokeless tobacco: Never   Vaping Use    Vaping Use: Never used   Substance and Sexual Activity    Alcohol use: Not Currently    Drug use: Not Currently   Social History Narrative    Senior tech artist      Social Determinants of Health     Financial Resource Strain: Low Risk  (8/21/2023)    Overall Financial Resource Strain (CARDIA)     Difficulty of Paying Living Expenses: Not very hard   Food Insecurity: No Food Insecurity (8/21/2023)    Hunger Vital Sign     Worried About Running Out of Food in the Last Year: Never true     Ran Out of Food in the Last Year: Never true   Transportation Needs: No Transportation Needs (8/21/2023)    PRAPARE - Transportation     Lack of Transportation (Medical): No     Lack of Transportation (Non-Medical): No   Physical Activity: Insufficiently Active (8/21/2023)    Exercise Vital Sign     Days of Exercise per Week: 3 days     Minutes of Exercise per Session: 30 min   Stress: Stress Concern Present (8/21/2023)    [employer] [place] [employer] [facility] [employer] - Occupational Stress Questionnaire     Feeling of Stress : To some extent   Social Connections: Socially Isolated (8/21/2023)    Social Connection and  "Isolation Panel [NHANES]     Frequency of Communication with Friends and Family: More than three times a week     Frequency of Social Gatherings with Friends and Family: Patient refused     Attends Mandaen Services: Never     Active Member of Clubs or Organizations: No     Attends Club or Organization Meetings: Never     Marital Status: Never    Housing Stability: Low Risk  (8/21/2023)    Housing Stability Vital Sign     Unable to Pay for Housing in the Last Year: No     Number of Places Lived in the Last Year: 1     Unstable Housing in the Last Year: No       FAMILY HISTORY:  Family History   Problem Relation Age of Onset    Hypertension Mother     Eczema Father     No Known Problems Brother     Breast Cancer Paternal Aunt     Diabetes Paternal Uncle     Hypertension Maternal Grandmother             Objective     Vital Signs: /80 (BP Location: Left arm, Patient Position: Sitting, BP Cuff Size: Adult)   Pulse (!) 104   Temp 37.1 °C (98.7 °F) (Temporal)   Ht 1.549 m (5' 1\")   Wt 82.6 kg (182 lb)   SpO2 97% Body mass index is 34.39 kg/m².    General: Appears well and comfortable  Eyes: No scleral or conjunctival lesions  ENT: No apparent oral or nasal lesions  Head/Neck: No apparent scalp or neck lesions  Cardiovascular: Regular rate and rhythm; no pericardial rubs  Respiratory: Breathing quiet and unlabored; no rales or pleural rubs  Gastrointestinal: No apparent organomegaly or abdominal masses  Integumentary: No significant cutaneous lesions or dyspigmentation  Musculoskeletal: Mild tenderness of knees (over medial joint line, right worse than left); no periarticular soft tissue swelling, warmth, erythema, or overt synovitis; no significant restriction in range of motion of joints examined  Neurologic: No focal sensory or motor deficits  Psychiatric: Mood and affect appropriate      LABORATORY RESULTS REVIEWED AND INTERPRETED BY ME:  Lab Results   Component Value Date/Time    CREACTPROT 1.22 " (H) 12/08/2023 03:38 PM    SEDRATEWES 5 12/08/2023 03:38 PM    URICACID 3.8 12/08/2023 03:38 PM     Lab Results   Component Value Date/Time    RHEUMFACTN <10 12/08/2023 03:38 PM     Lab Results   Component Value Date/Time    ANTINUCAB Detected (A) 12/08/2023 03:38 PM     Lab Results   Component Value Date/Time    ANTIDNADS SEE BELOW 12/08/2023 03:38 PM    SMITHAB 5 12/08/2023 03:38 PM    RNPAB 4 12/08/2023 03:38 PM    CZTMFXT15 8 12/08/2023 03:38 PM    SSA60 5 12/08/2023 03:38 PM    SSA52 4 12/08/2023 03:38 PM    ANTISSBSJ 1 12/08/2023 03:38 PM    JO1AB 3 12/08/2023 03:38 PM     Lab Results   Component Value Date/Time     09/15/2023 11:53 AM     Lab Results   Component Value Date/Time    TTRANSIGA <2 09/15/2023 11:53 AM     Lab Results   Component Value Date/Time    TSHULTRASEN <0.005 (L) 02/05/2024 03:28 PM    FREET4 1.22 02/05/2024 03:28 PM     Lab Results   Component Value Date/Time    PTHINTACT 47.3 07/19/2023 03:18 PM     Lab Results   Component Value Date/Time    WBC 5.8 08/18/2023 08:05 PM    RBC 5.08 08/18/2023 08:05 PM    HEMOGLOBIN 14.4 08/18/2023 08:05 PM    HEMATOCRIT 43.2 08/18/2023 08:05 PM    MCV 85.0 08/18/2023 08:05 PM    MCH 28.3 08/18/2023 08:05 PM    MCHC 33.3 08/18/2023 08:05 PM    RDW 38.8 08/18/2023 08:05 PM    PLATELETCT 283 08/18/2023 08:05 PM    MPV 10.0 08/18/2023 08:05 PM    NEUTS 3.31 08/18/2023 08:05 PM    LYMPHOCYTES 28.30 08/18/2023 08:05 PM    MONOCYTES 10.90 08/18/2023 08:05 PM    EOSINOPHILS 2.60 08/18/2023 08:05 PM    BASOPHILS 0.50 08/18/2023 08:05 PM     Lab Results   Component Value Date/Time    ASTSGOT 27 08/18/2023 08:05 PM    ALTSGPT 39 08/18/2023 08:05 PM    ALKPHOSPHAT 98 08/18/2023 08:05 PM    TBILIRUBIN 0.3 08/18/2023 08:05 PM    TOTPROTEIN 7.2 08/18/2023 08:05 PM    ALBUMIN 3.9 08/18/2023 08:05 PM     Lab Results   Component Value Date/Time    SODIUM 138 08/22/2023 11:21 AM    POTASSIUM 3.8 08/22/2023 11:21 AM    CHLORIDE 105 08/22/2023 11:21 AM    CO2 21  08/22/2023 11:21 AM    GLUCOSE 80 08/22/2023 11:21 AM    BUN 10 08/22/2023 11:21 AM    CREATININE 0.56 08/22/2023 11:21 AM    CALCIUM 9.3 08/22/2023 11:21 AM     Lab Results   Component Value Date/Time    COLORURINE Yellow 08/18/2023 07:53 PM    SPECGRAVITY 1.011 08/18/2023 07:53 PM    PHURINE 5.5 08/18/2023 07:53 PM    GLUCOSEUR Negative 08/18/2023 07:53 PM    KETONES 15 (A) 08/18/2023 07:53 PM    PROTEINURIN Negative 08/18/2023 07:53 PM     Lab Results   Component Value Date/Time    HEPCAB Non-Reactive 08/22/2023 11:21 AM       RADIOLOGY RESULTS REVIEWED AND INTERPRETED BY ME:  No loadable results found in the system. Pertinent non-system results, if applicable, summarized under history above.      All relevant laboratory and imaging results reported on this note were reviewed and interpreted by me.         Assessment & Plan     Deisy Gilbert is a 33 y.o. female with history as noted above whose presentation merits the following clinical impressions and recommendations:    1. 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 Notably, the MEGHAN test is highly sensitive and lacks diagnostic specificity as it can be seen in a variety of non-rheumatic conditions, such as acute or chronic bacterial or viral infections (presumably via molecular mimicry), autoimmune thyroid disease (Hashimoto's thyroiditis or Graves' disease), autoimmune hepatobiliary disease, inflammatory bowel disease, and lymphoproliferative disease or malignancy, as well as in over 10% of healthy individuals with no clinical evidence of autoimmune rheumatic disease. In this case, it is conceivable that her Graves' disease or its treatment with methimazole may be contributing to the MEGHAN positivity. In  any case, it must be interpreted in the context of the overall clinical picture with close attention to disease-specific manifestations and disease-specific antibody subtypes. That said, the presence of persistently positive MEGHAN, especially in high or rising titers, does confer some risk of MEGHAN-associated disease, so if truly inflammatory symptoms develop and persist, clinical follow-up for re-evaluation would be reasonable. For now, reasonable to undertake the additional workup noted below for confirmatory, exclusionary, and risk stratification purposes based on which additional recommendations may be provided.  - ANTI-HISTONE ABS; Future  - CHROMATIN AB,IGG; Future  - COMPLEMENT C3; Future  - COMPLEMENT C4; Future  - COMPLEMENT TOTAL (CH50); Future    2. Arthralgia, unspecified joint  Clinically consistent with biomechanical arthralgia raising concern for osteoarthritis, so reasonable to assess her most bothersome joints with radiographs.  - DX-KNEES-AP BILATERAL STANDING; Future    3. Graves disease  Autoimmune thyroid disease in which the disease itself, particularly in the setting of thyroid-related antibodies, or its treatment with methimazole could trigger positive MEGHAN, hence the additional workup being undertaken.  - ANTITHYROGLOBULIN AB; Future  - THYROID PEROXIDASE  (TPO) AB; Future      The above assessment and plan were discussed with the patient who acknowledged understanding of the plan.    FOLLOW-UP: Return for follow-up TBD.         Thank you for the opportunity to participate in the care of Deisy Gilbert.    Akash Baldwin MD, MS, FACR  Rheumatologist, [facility] Rheumatology ? [facility]   of Clinical Medicine, Department of Internal Medicine  [facility] ? [facility]

## 2024-02-07 NOTE — PATIENT INSTRUCTIONS
AFTER VISIT INSTRUCTIONS    Below are important information to help you navigate your healthcare needs and help us serve you safely and effectively:  If laboratory tests and/or imaging studies were ordered, remember to go get them done as instructed.  If new prescriptions and/or refills were sent, remember to go pick them up from your local pharmacy, or call the specialty pharmacy to request shipment.  Always take your prescription medications exactly as prescribed unless instructed otherwise.  Note that antirheumatic drugs and steroids are immunosuppressive which means they increase your risk of infections and have multiple potential adverse effects on various organ systems in your body, though many of them are uncommon.  It is important that you are up-to-date on age-appropriate immunizations, particularly shingles and bacterial/viral pneumonia vaccines, which you can request from me or your primary care provider.  Be sure to read the drug package inserts to learn about the potential side effects of your medications before you start taking them.  If you experience any significant drug side effects, stop taking the medication and notify me promptly, and depending on the severity of the side effects, consider going to an urgent care or emergency department for immediate attention.  If there are significant findings on your lab tests and imaging studies that warrant further action, I will notify you with explanations via [employer] or phone call, otherwise you can view them on [employer] and let me know if you have any questions.  Note that [employer] messages are typically read during office hours and may take 1-7 business days before a response depending on the urgency of the situation and how busy my clinic schedule is.  In general, [employer] messaging is for non-urgent matters that do not require immediate attention, so for urgent matters that cannot wait, you are advised to go to an urgent care.  You are granted [employer]  access to my documentation of your visit and are encouraged to read my note which details my assessment and plan for your condition.  To learn more about your condition and rheumatic diseases evaluated and treated by rheumatologists, as well as gain access to many helpful resources about these diseases, visit our website: [email or web address].  To properly dispose of your unused, unwanted, or residual medications/supplies, visit the Drug Enforcement Administration website to locate your closest drop-off location: www.[place].gov/everyday-takeback-day.

## 2024-02-09 ENCOUNTER — OFFICE VISIT (OUTPATIENT)
Dept: ENDOCRINOLOGY | Facility: MEDICAL CENTER | Age: 34
End: 2024-02-09
Attending: STUDENT IN AN ORGANIZED HEALTH CARE EDUCATION/TRAINING PROGRAM
Payer: COMMERCIAL

## 2024-02-09 VITALS
HEIGHT: 61 IN | BODY MASS INDEX: 35.12 KG/M2 | SYSTOLIC BLOOD PRESSURE: 146 MMHG | DIASTOLIC BLOOD PRESSURE: 105 MMHG | WEIGHT: 186 LBS | OXYGEN SATURATION: 96 % | HEART RATE: 89 BPM

## 2024-02-09 DIAGNOSIS — E05.90 HYPERTHYROIDISM: ICD-10-CM

## 2024-02-09 LAB
CH50 SERPL-ACNC: 88.3 U/ML (ref 38.7–89.9)
CHROMATIN IGG SERPL-ACNC: 5 UNITS (ref 0–19)
THYROGLOB AB SERPL-ACNC: 1 IU/ML (ref 0–4)

## 2024-02-09 PROCEDURE — 99211 OFF/OP EST MAY X REQ PHY/QHP: CPT | Performed by: STUDENT IN AN ORGANIZED HEALTH CARE EDUCATION/TRAINING PROGRAM

## 2024-02-09 PROCEDURE — 3080F DIAST BP >= 90 MM HG: CPT | Performed by: STUDENT IN AN ORGANIZED HEALTH CARE EDUCATION/TRAINING PROGRAM

## 2024-02-09 PROCEDURE — 99215 OFFICE O/P EST HI 40 MIN: CPT | Performed by: STUDENT IN AN ORGANIZED HEALTH CARE EDUCATION/TRAINING PROGRAM

## 2024-02-09 PROCEDURE — 3077F SYST BP >= 140 MM HG: CPT | Performed by: STUDENT IN AN ORGANIZED HEALTH CARE EDUCATION/TRAINING PROGRAM

## 2024-02-09 RX ORDER — METHIMAZOLE 10 MG/1
15 TABLET ORAL DAILY
Qty: 140 TABLET | Refills: 4 | Status: SHIPPED | OUTPATIENT
Start: 2024-02-09

## 2024-02-09 ASSESSMENT — FIBROSIS 4 INDEX: FIB4 SCORE: 0.5

## 2024-02-09 NOTE — PROGRESS NOTES
"Follow up Endocrinology Visit  Initial consult/last visit on: 9/7/23  Last visit on: 11/09/23  Referred by:  Nanci Lucero M.D.    Chief complaint:  Deisy Gilbert, is a very pleasant 32 y.o.female, who is here for follow up for Graves disease    Interval history:   - patient developed disturbing knee pains, noted elevated MEGHAN, was seen by rheumatologist, there is a concern of possible MMI-induced SLE  - patient continues to feel fatigue  - eye \"are getting better\", she still feels gritty  sensation, uses artifical tears, denies blurry/double vision    Wt Readings from Last 5 Encounters:   02/09/24 84.4 kg (186 lb)   02/07/24 82.6 kg (182 lb)   01/28/24 80.9 kg (178 lb 6.4 oz)   12/19/23 80.7 kg (178 lb)   12/04/23 80.8 kg (178 lb 3.2 oz)     Prior notes:  - since last visit feeling much better: tremor stopped, muscle weakness resolved, able to squat without any problem, currently using cane intermittently with back pains, which she associates with kidney stones  - diarrhea improved, but not resolved, patient asked for referral to GI specialist from PCP  - reports new right eye periorbital edema, gritty sensation, light sensitivity, ptosis, intermittent blurry vision, scheduled with neuro-ophthalmologist on 10/17/2023  - plans for surgical kidney stones removal on 10/18/2023  - reviewed with the patient new labs and thyroid US  11/09/23  - overall doing much better, however still feels very tired,  despite sleeping well  - gained couple lb since last visit  - still has diarrhea, even it much improved since initiation Rx with MMI; plans for GI evaluation  - had surgery for kidney stone removal, unfortunately, kidney stone was no evaluated for composition  - still has R eye ptosis and tearing, myasthenia gravis was ruled out  - followed by neuro-ophthalmologist, for now recommended active surveillance  - recent labs were reviewed    ALEJANDRO symptoms:  Dry, gritty eye  sensation - in R eye  Light sensitivity - in R " eye  Tearing - no  Red eye - no  Proptosis -  R eye  Pain or pressure behind the eye - pressure behind R eye  Retroorbital pain - no  Pain with eye movement  - no   Eyelid retraction -  no  Eyelid swelling - R eye  Discomfort or pain in or behind the eye with looking L/R or up/down - no  Double vision - no  Blurry vision - intermittently  Color vision loss - no  Vision loss - no    Hyperthyroidism HPI:  - was feeling tired for many months  - she lost 11 lb but associated with recent loss of her friend  - since beginning of 8/2023 patient noted nausea, diarrhea (having up to 4 BMS/day, watery stools), later on she noted palpitations  - she also noted worsening of her anxiety, reports  insomnia, sweating/hot flashes, heaviness on her chest, hand tremors, muscle weakness to the point that she needs to use cane to walk - persistent  - on labs from 8/23/23 - biochemical evidence of hyperthyroidism + elevated TrAbs  - was started on MMI 30 mg/day + propranolol 10 mg TID - no improvement of symptoms so far  - nonsmoker  - Fhx - thyroid disease in her aunt, not sure about the diagnosis    Medications:  Current Outpatient Medications:     fluticasone (FLONASE) 50 MCG/ACT nasal spray, Administer 1 Spray into affected nostril(S) every day., Disp: 16 g, Rfl: 0    methimazole (TAPAZOLE) 10 MG Tab, TAKE 1 TABLET BY MOUTH THREE TIMES A DAY, Disp: 270 Tablet, Rfl: 1    ciclopirox (PENLAC) 8 % solution, Apply evenly over entire nail plate nightly to all affected nails., Disp: 6 mL, Rfl: 3    norgestrel-ethinyl estradiol (LOW-OGESTREL) 0.3-30 MG-MCG Tab, Take 1 Tablet by mouth every day., Disp: 84 Tablet, Rfl: 4    dicyclomine (BENTYL) 10 MG Cap, Take 1 Capsule by mouth 3 times a day as needed (colic pain)., Disp: 60 Capsule, Rfl: 0    Potassium Citrate 15 MEQ (1620 MG) Tab CR, , Disp: , Rfl:     Probiotic Product (PRO-BIOTIC BLEND PO), Take  by mouth., Disp: , Rfl:     Physical Examination:   Vital signs: BP (!) 146/105 (BP  "Location: Left arm, Patient Position: Sitting, BP Cuff Size: Adult)   Pulse 89   Ht 1.549 m (5' 1\")   Wt 84.4 kg (186 lb)   SpO2 96%   BMI 35.14 kg/m²   General: No distress, cooperative, well dressed and well nourished.   Resp: Normal effort.  CVS: Regular rate and rhythm.  Extremities: No edema bilateral extremities  Neuro: Alert and oriented.   Skin: No rash, No Ulcers  Psych: Normal mood and affect    Labs:  MOST RECENT LABS:  - reviewed      PRIOR PERTINENT LABS:  - reviewed              Imaging:  - reviewed  Thyroid US on 10/2/2023:  HISTORY/REASON FOR EXAM:  Hyperthyroidism/Graves disease, thyroid gland enlargement.  TECHNIQUE/EXAM DESCRIPTION:  Ultrasound of the soft tissues of the head and neck.  COMPARISON:  None  FINDINGS:  The thyroid gland is heterogeneous.  Vascularity is increased.  The right lobe of the thyroid gland measures 2.63 cm x 4.56 cm x 2.41 cm. The contour and echogenicity are normal. No focal mass lesions are identified.  The left lobe of the thyroid gland measures 2.08 cm x 4.21 cm x 1.73 cm. The contour and echogenicity are normal. No focal mass lesions are identified.  The isthmus measures 0.54 cm.  IMPRESSION:  1.  Mildly enlarged heterogeneous thyroid gland with hyperemia. Findings are consistent with Graves' disease.    Assessment and Plan:  1. Graves disease      ALEJANDRO (R eye)      Thyroid myopathy, resolved  - on maintenance dose of MMI, fT4 wnl, TSH is still suppressed - likely indicates that patient needs higher dose of MMI  - concerns of MMI- induced SLE, follows rheumatologist  - had extensive discussion of possible approaches, there is a high risk of having SLE even with transition to PTU - will discuss with rheumatologist if that is reasonable, if neither of thionamides could be used -> consider surgical therapy, as GIRON might exacerbate ALEJANDRO  - discussed consequences of thyroidectomy and principles of replacement therapy   - patient will continue evaluation by rheumatology, " will update me about plans, if transition to PTU is reasonable, if continues to take MMI -> consider increase the dose to 15 mg/day.   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 - periodic hypokalemic paralysis and thyrotoxic periodic  paralysis are unlikely given persistent nature of muscle weakness vs myopathic attacks, already on propranolol - treatment for thyrotoxic periodic paralysis  - appropriately started on MMI and nonselective beta-blocker  - we discussed cause and natural history of the Graves disease, treatment approaches  - I explained that it takes up to 3-4 weeks to reach euthyroidism  - clinical and biochemical improvement of hyperthyroidism with normalization of free T4, TSH is still suppressed but likely is lagging in response  - we will decrease MMI to maintenance dose of 10 mg a day  - patient continues to have diarrhea, unlikely related to hyperthyroidism, celiac disease work-up was negative, agree with PCP with GI referral  - new symptoms consistent with right thyroid eye disease, referred to neuro-ophthalmologist    Plan:  Further evaluation by rheumatology for possible MMI induced SLE. Discussion of dose dependency, risks of persistence of side effect with transition to PTU vs thyroidectomy  If ok per rheum to stay on MMI -> increase dose to 15 mg/day.   Repeat TFTs in  mo.  Follow neuro-ophthalmologist recommendations.  Meanwhile, use sunglasses, artificial teardrops, avoid primary and secondary smoking    RTC: 8 weeks  Total time (face-to-face and non-face-to face time):  40 min  Plan reviewed with the patient and agreed with plan.  All questions answered to patient's satisfaction.  Thank you kindly for allowing me to participate in  the care plan for this patient.    Kamilah Danielson MD    CC:   Nanci Lucero M.D.

## 2024-02-10 LAB — HISTONE IGG SER IA-ACNC: 0.3 UNITS (ref 0–0.9)

## 2024-02-12 ENCOUNTER — OFFICE VISIT (OUTPATIENT)
Dept: MEDICAL GROUP | Facility: MEDICAL CENTER | Age: 34
End: 2024-02-12
Payer: COMMERCIAL

## 2024-02-12 VITALS
BODY MASS INDEX: 34.17 KG/M2 | DIASTOLIC BLOOD PRESSURE: 80 MMHG | HEART RATE: 80 BPM | HEIGHT: 61 IN | TEMPERATURE: 96.7 F | WEIGHT: 181 LBS | OXYGEN SATURATION: 95 % | SYSTOLIC BLOOD PRESSURE: 110 MMHG

## 2024-02-12 DIAGNOSIS — E05.00 GRAVES DISEASE: Primary | ICD-10-CM

## 2024-02-12 DIAGNOSIS — N20.0 RECURRENT KIDNEY STONES: ICD-10-CM

## 2024-02-12 DIAGNOSIS — E55.9 VITAMIN D DEFICIENCY: ICD-10-CM

## 2024-02-12 DIAGNOSIS — B35.1 ONYCHOMYCOSIS: ICD-10-CM

## 2024-02-12 DIAGNOSIS — R53.83 FATIGUE, UNSPECIFIED TYPE: ICD-10-CM

## 2024-02-12 PROCEDURE — 3074F SYST BP LT 130 MM HG: CPT | Performed by: STUDENT IN AN ORGANIZED HEALTH CARE EDUCATION/TRAINING PROGRAM

## 2024-02-12 PROCEDURE — 99214 OFFICE O/P EST MOD 30 MIN: CPT | Performed by: STUDENT IN AN ORGANIZED HEALTH CARE EDUCATION/TRAINING PROGRAM

## 2024-02-12 PROCEDURE — 3079F DIAST BP 80-89 MM HG: CPT | Performed by: STUDENT IN AN ORGANIZED HEALTH CARE EDUCATION/TRAINING PROGRAM

## 2024-02-12 RX ORDER — PROPRANOLOL HYDROCHLORIDE 10 MG/1
10 TABLET ORAL 3 TIMES DAILY PRN
Qty: 30 TABLET | Refills: 0
Start: 2024-02-12 | End: 2024-03-11

## 2024-02-12 ASSESSMENT — PATIENT HEALTH QUESTIONNAIRE - PHQ9: CLINICAL INTERPRETATION OF PHQ2 SCORE: 0

## 2024-02-12 ASSESSMENT — FIBROSIS 4 INDEX: FIB4 SCORE: 0.5

## 2024-02-12 NOTE — PROGRESS NOTES
Subjective:     CC:   Chief Complaint   Patient presents with    Joint Pain     Follow up          HPI:   Deisy presents today with    per rheumatologist less likely lupus but methimazole /drug induced lupus is possible differential. work up in process   Per endocrinology -- joint pains probably due to inadequate treatment of hypothyroidism , recommended probably increasing the methimazole might help with the pain.   Currently patient is continuing with current dose of methimazole 10 mg daily.   Last TSH checked earlier this month was low less than 0.005 with normal free T4 and high free T3 at 19.9.  I agree with this thyroid lab patient needs to go up on the methimazole as her thyroid labs still show hyperthyroid picture.    Patient continues to have knee pain and some muscle pain in her lower extremity which is ongoing.  Pain is more worse at night.   X-rays of joints --> no inflammatory changes.     Problem   Graves Disease    Chronic, stable  Diagnosed last year with hypothyroidism due to Graves' disease  Currently following up with endocrinology  Based on her recent thyroid labs methimazole dose was considered to be increased to 15 mg once daily but currently taking only 10 mg as we are waiting for rheumatologist recommendations as well  Following up with ophthalmologist every 6 months       Recurrent Kidney Stones    H/o Recurrent renal stones:   Onset: 2 years back  Usually small stones that she usually pass with good hydration .  usually stones In right kidney.   Got established with Urology in July for recurrent stones.  Early Aug 2023 started having worsening flank pain   Got CT renal done 08/09/23 at [facility] showed b/l renal stone about 5-6 mm    Patient is doing much better now.  She has been following up with urology and in October she had lithotripsy.  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 140 Tablet 4    fluticasone (FLONASE) 50 MCG/ACT nasal spray Administer 1 Spray into affected nostril(S) every day. 16 g 0    ciclopirox (PENLAC) 8 % solution Apply evenly over entire nail plate nightly to all affected nails. 6 mL 3    norgestrel-ethinyl estradiol (LOW-OGESTREL) 0.3-30 MG-MCG Tab Take 1 Tablet by mouth every day. 84 Tablet 4    dicyclomine (BENTYL) 10 MG Cap Take 1 Capsule by mouth 3 times a day as needed (colic pain). 60 Capsule 0    Potassium Citrate 15 MEQ (1620 MG) Tab CR       Probiotic Product (PRO-BIOTIC BLEND PO) Take  by mouth.       No current facility-administered medications on file prior to visit.       Health Maintenance: Completed    ROS:  Review of Systems   Constitutional:  Positive for malaise/fatigue.   Musculoskeletal:  Positive for joint pain.       Review of systems unremarkable except for concerns noted by patient or items listed.    Please see HPI for additional ROS.      Objective:     Exam:  /80 (BP Location: Left arm, Patient Position: Sitting)   Pulse 80   Temp 35.9 °C (96.7 °F) (Temporal)   Ht 1.549 m (5' 1\")   Wt 82.1 kg (181 lb)   LMP 01/22/2024   SpO2 95%   BMI 34.20 kg/m²  Body mass index is 34.2 kg/m².    Physical Exam  Constitutional:       Appearance: Normal appearance.   HENT:      Head: Normocephalic and atraumatic.      Nose: Nose normal.   Eyes:      Conjunctiva/sclera: Conjunctivae normal.   Cardiovascular:      Rate and Rhythm: " Normal rate.      Pulses: Normal pulses.      Heart sounds: Normal heart sounds.   Pulmonary:      Effort: Pulmonary effort is normal.      Breath sounds: Normal breath sounds.   Neurological:      Mental Status: She is alert and oriented to person, place, and time. Mental status is at baseline.   Psychiatric:         Mood and Affect: Mood normal.         Behavior: Behavior normal.             Labs: reviewed     Assessment & Plan:     33 y.o. female with the following -       1. Recurrent kidney stones  Chronic,  Patient reports having lithotripsy procedure done in October for kidney stones.  Plan  Recommended good hydration  Continue following up with urology as needed    2. Graves disease  Chronic, stable  Continue following up with endocrinologist  Continue methimazole as recommended by the specialist  - Comp Metabolic Panel; Future  - Lipid Profile; Future    3. Fatigue, unspecified type  Chronic, stable  Most likely due to ongoing autoimmune condition/Graves'  But given patient reports history of vitamin D deficiency in the past I will check for both B12 and vitamin D.  Repeat CBC to check hemoglobin levels  - CBC WITHOUT DIFFERENTIAL; Future  - VITAMIN D,25 HYDROXY (DEFICIENCY); Future  - VITAMIN B12; Future    4. Vitamin D deficiency  History of vitamin D deficiency  Recommended patient to take over-the-counter vitamin D 1000 IU once a day will repeat labs  - VITAMIN D,25 HYDROXY (DEFICIENCY); Future    5. Onychomycosis  Chronic, stable  Continue topical ciclopirox nail solution once a day  Repeat labs to check liver function test.  - Comp Metabolic Panel; Future          Return in about 6 months (around 8/12/2024), or if symptoms worsen or fail to improve, for preventive wellness.    Please note that this dictation was created using voice recognition software. I have made every reasonable attempt to correct obvious errors, but I expect that there are errors of grammar and possibly content that I did not  discover before finalizing the note.

## 2024-02-22 ENCOUNTER — OFFICE VISIT (OUTPATIENT)
Dept: URGENT CARE | Facility: CLINIC | Age: 34
End: 2024-02-22
Payer: COMMERCIAL

## 2024-02-22 ENCOUNTER — APPOINTMENT (OUTPATIENT)
Dept: RADIOLOGY | Facility: IMAGING CENTER | Age: 34
End: 2024-02-22
Attending: NURSE PRACTITIONER
Payer: COMMERCIAL

## 2024-02-22 VITALS
HEIGHT: 61 IN | TEMPERATURE: 97.3 F | BODY MASS INDEX: 34.17 KG/M2 | HEART RATE: 90 BPM | DIASTOLIC BLOOD PRESSURE: 80 MMHG | RESPIRATION RATE: 16 BRPM | OXYGEN SATURATION: 98 % | SYSTOLIC BLOOD PRESSURE: 100 MMHG | WEIGHT: 181 LBS

## 2024-02-22 DIAGNOSIS — M25.531 WRIST PAIN, ACUTE, RIGHT: ICD-10-CM

## 2024-02-22 DIAGNOSIS — M65.4 TENDINITIS, DE QUERVAIN'S: ICD-10-CM

## 2024-02-22 PROCEDURE — 99213 OFFICE O/P EST LOW 20 MIN: CPT | Performed by: NURSE PRACTITIONER

## 2024-02-22 PROCEDURE — 3074F SYST BP LT 130 MM HG: CPT | Performed by: NURSE PRACTITIONER

## 2024-02-22 PROCEDURE — 3079F DIAST BP 80-89 MM HG: CPT | Performed by: NURSE PRACTITIONER

## 2024-02-22 PROCEDURE — 73110 X-RAY EXAM OF WRIST: CPT | Mod: TC,RT | Performed by: NURSE PRACTITIONER

## 2024-02-22 RX ORDER — NAPROXEN 500 MG/1
500 TABLET ORAL 2 TIMES DAILY WITH MEALS
Qty: 28 TABLET | Refills: 0 | Status: SHIPPED | OUTPATIENT
Start: 2024-02-22 | End: 2024-03-07

## 2024-02-22 RX ORDER — IBUPROFEN 600 MG/1
600 TABLET ORAL EVERY 6 HOURS PRN
Qty: 30 TABLET | Refills: 0 | Status: SHIPPED | OUTPATIENT
Start: 2024-02-22 | End: 2024-02-22

## 2024-02-22 ASSESSMENT — FIBROSIS 4 INDEX: FIB4 SCORE: 0.5

## 2024-02-22 ASSESSMENT — ENCOUNTER SYMPTOMS
TINGLING: 1
NUMBNESS: 0

## 2024-02-22 NOTE — PATIENT INSTRUCTIONS
-Can also taker tylenol as directed for pain.  -RICE Therapy: Rest, Ice, Compression, Elevation    Follow up emergently for severe uncontrolled pain, neurovascular compromise (decreased sensation, motion, or circulation).

## 2024-02-22 NOTE — PROGRESS NOTES
Subjective:     Deisy Gilbert is a 33 y.o. female who presents for Wrist Pain (Been having joint pain since this weekend )      Presents for right wrist pain that started over the weekend. Denies a specific injury. Had been working out, lifting weights. States she noticed a popping in the wrist this morningPain is to the radial aspect. Thumb tingling. Hx of joint pain, primary in knees. Had seen a rheumatologist.     Wrist Injury   The pain is at a severity of 8/10. Associated symptoms include tingling. Pertinent negatives include no numbness. She has tried immobilization (Tiger balm and wrist splint.) for the symptoms.     Past Medical History:   Diagnosis Date    Graves disease 08/18/2023    History of kidney stones        Past Surgical History:   Procedure Laterality Date    CYSTOSCOPY STENT PLACEMENT  2017       Social History     Socioeconomic History    Marital status: Single     Spouse name: Not on file    Number of children: Not on file    Years of education: Not on file    Highest education level: Bachelor's degree (e.g., BA, AB, BS)   Occupational History    Not on file   Tobacco Use    Smoking status: Former     Types: Cigarettes    Smokeless tobacco: Never   Vaping Use    Vaping Use: Never used   Substance and Sexual Activity    Alcohol use: Not Currently    Drug use: Not Currently    Sexual activity: Not on file   Other Topics Concern    Not on file   Social History Narrative    Senior tech artist      Social Determinants of Health     Financial Resource Strain: Low Risk  (8/21/2023)    Overall Financial Resource Strain (CARDIA)     Difficulty of Paying Living Expenses: Not very hard   Food Insecurity: No Food Insecurity (8/21/2023)    Hunger Vital Sign     Worried About Running Out of Food in the Last Year: Never true     Ran Out of Food in the Last Year: Never true   Transportation Needs: No Transportation Needs (8/21/2023)    PRAPARE - Transportation     Lack of Transportation (Medical): No      "Lack of Transportation (Non-Medical): No   Physical Activity: Insufficiently Active (8/21/2023)    Exercise Vital Sign     Days of Exercise per Week: 3 days     Minutes of Exercise per Session: 30 min   Stress: Stress Concern Present (8/21/2023)    [place] [employer] - Occupational Stress Questionnaire     Feeling of Stress : To some extent   Social Connections: Socially Isolated (8/21/2023)    Social Connection and Isolation Panel [NHANES]     Frequency of Communication with Friends and Family: More than three times a week     Frequency of Social Gatherings with Friends and Family: Patient declined     Attends Restorationism Services: Never     Active Member of Clubs or Organizations: No     Attends Club or Organization Meetings: Never     Marital Status: Never    Intimate Partner Violence: Not on file   Housing Stability: Low Risk  (8/21/2023)    Housing Stability Vital Sign     Unable to Pay for Housing in the Last Year: No     Number of Places Lived in the Last Year: 1     Unstable Housing in the Last Year: No        Family History   Problem Relation Age of Onset    Hypertension Mother     Eczema Father     No Known Problems Brother     Breast Cancer Paternal Aunt     Diabetes Paternal Uncle     Hypertension Maternal Grandmother         Allergies   Allergen Reactions    Lactose      Other reaction(s): GI Intolerance       Review of Systems   Musculoskeletal:  Positive for joint pain.   Neurological:  Positive for tingling. Negative for numbness.   All other systems reviewed and are negative.       Objective:   /80 (BP Location: Left arm, Patient Position: Sitting, BP Cuff Size: Adult)   Pulse 90   Temp 36.3 °C (97.3 °F) (Temporal)   Resp 16   Ht 1.549 m (5' 1\")   Wt 82.1 kg (181 lb)   LMP 01/22/2024   SpO2 98%   BMI 34.20 kg/m²     Physical Exam  Vitals reviewed.   Constitutional:       General: She is not in acute distress.  Pulmonary:      Effort: Pulmonary effort is " normal.   Musculoskeletal:         General: Tenderness present. No deformity.      Right wrist: Tenderness present. No deformity, effusion or crepitus. Normal pulse.      Right hand: Normal. Normal strength. Normal capillary refill.      Comments: Right radial wrist tenderness to palpation. Positive finkelstein.  5/5.     Skin:     General: Skin is warm and dry.      Capillary Refill: Capillary refill takes less than 2 seconds.      Findings: No bruising or erythema.   Neurological:      Mental Status: She is alert and oriented to person, place, and time.         Assessment/Plan:   1. Tendinitis, de Quervain's  - naproxen (NAPROSYN) 500 MG Tab; Take 1 Tablet by mouth 2 times a day with meals for 14 days.  Dispense: 28 Tablet; Refill: 0  - Referral to Sports Medicine    2. Wrist pain, acute, right  - naproxen (NAPROSYN) 500 MG Tab; Take 1 Tablet by mouth 2 times a day with meals for 14 days.  Dispense: 28 Tablet; Refill: 0  - Referral to Sports Medicine  - DX-WRIST-COMPLETE 3+ RIGHT; Future  DX-WRIST-COMPLETE 3+ RIGHT    Result Date: 2/22/2024 2/22/2024 9:01 AM HISTORY/REASON FOR EXAM:  Atraumatic Pain/Swelling/Deformity. TECHNIQUE/EXAM DESCRIPTION AND NUMBER OF VIEWS:  4 views of the  RIGHT wrist. COMPARISON: None FINDINGS: MINERALIZATION: Mineralization is unremarkable for age. INJURY: No acute fracture or gross malalignment is seen. JOINTS: No erosive arthropathy is evident.     No radiographic evidence of acute traumatic injury or bony erosion.    -Thumb spica  -Can also taker tylenol as directed for pain.  -RICE Therapy: Rest, Ice, Compression, Elevation    Follow up emergently for severe uncontrolled pain, neurovascular compromise (decreased sensation, motion, or circulation).    -Physical exam findings are consistent with tendonitis. Patient requesting imaging, as she's concerned with popping in the wrist. Discussed initial conservative measures, with follow up for persistent or worsening symptoms.      Differential diagnosis, natural history, supportive care, and indications for immediate follow-up discussed.

## 2024-03-08 ENCOUNTER — HOSPITAL ENCOUNTER (EMERGENCY)
Facility: MEDICAL CENTER | Age: 34
End: 2024-03-08
Attending: EMERGENCY MEDICINE
Payer: COMMERCIAL

## 2024-03-08 ENCOUNTER — APPOINTMENT (OUTPATIENT)
Dept: RADIOLOGY | Facility: MEDICAL CENTER | Age: 34
End: 2024-03-08
Attending: EMERGENCY MEDICINE
Payer: COMMERCIAL

## 2024-03-08 ENCOUNTER — PHARMACY VISIT (OUTPATIENT)
Dept: PHARMACY | Facility: MEDICAL CENTER | Age: 34
End: 2024-03-08
Payer: COMMERCIAL

## 2024-03-08 VITALS
RESPIRATION RATE: 17 BRPM | SYSTOLIC BLOOD PRESSURE: 140 MMHG | WEIGHT: 181 LBS | HEIGHT: 61 IN | TEMPERATURE: 97.6 F | BODY MASS INDEX: 34.17 KG/M2 | OXYGEN SATURATION: 92 % | DIASTOLIC BLOOD PRESSURE: 74 MMHG | HEART RATE: 64 BPM

## 2024-03-08 DIAGNOSIS — N20.0 KIDNEY STONE: ICD-10-CM

## 2024-03-08 LAB
ALBUMIN SERPL BCP-MCNC: 4.3 G/DL (ref 3.2–4.9)
ALBUMIN/GLOB SERPL: 1.3 G/DL
ALP SERPL-CCNC: 132 U/L (ref 30–99)
ALT SERPL-CCNC: 26 U/L (ref 2–50)
ANION GAP SERPL CALC-SCNC: 15 MMOL/L (ref 7–16)
APPEARANCE UR: CLEAR
APPEARANCE UR: CLEAR
AST SERPL-CCNC: 23 U/L (ref 12–45)
BACTERIA #/AREA URNS HPF: ABNORMAL /HPF
BACTERIA #/AREA URNS HPF: ABNORMAL /HPF
BASOPHILS # BLD AUTO: 0.8 % (ref 0–1.8)
BASOPHILS # BLD: 0.06 K/UL (ref 0–0.12)
BILIRUB SERPL-MCNC: 0.4 MG/DL (ref 0.1–1.5)
BILIRUB UR QL STRIP.AUTO: NEGATIVE
BILIRUB UR QL STRIP.AUTO: NEGATIVE
BUN SERPL-MCNC: 9 MG/DL (ref 8–22)
CALCIUM ALBUM COR SERPL-MCNC: 8.8 MG/DL (ref 8.5–10.5)
CALCIUM SERPL-MCNC: 9 MG/DL (ref 8.5–10.5)
CHLORIDE SERPL-SCNC: 106 MMOL/L (ref 96–112)
CO2 SERPL-SCNC: 17 MMOL/L (ref 20–33)
COLOR UR: YELLOW
COLOR UR: YELLOW
CREAT SERPL-MCNC: 0.57 MG/DL (ref 0.5–1.4)
EOSINOPHIL # BLD AUTO: 0.24 K/UL (ref 0–0.51)
EOSINOPHIL NFR BLD: 3.4 % (ref 0–6.9)
EPI CELLS #/AREA URNS HPF: ABNORMAL /HPF
EPI CELLS #/AREA URNS HPF: ABNORMAL /HPF
ERYTHROCYTE [DISTWIDTH] IN BLOOD BY AUTOMATED COUNT: 41.9 FL (ref 35.9–50)
GFR SERPLBLD CREATININE-BSD FMLA CKD-EPI: 123 ML/MIN/1.73 M 2
GLOBULIN SER CALC-MCNC: 3.3 G/DL (ref 1.9–3.5)
GLUCOSE SERPL-MCNC: 101 MG/DL (ref 65–99)
GLUCOSE UR STRIP.AUTO-MCNC: NEGATIVE MG/DL
GLUCOSE UR STRIP.AUTO-MCNC: NEGATIVE MG/DL
HCG SERPL QL: NEGATIVE
HCT VFR BLD AUTO: 46.9 % (ref 37–47)
HGB BLD-MCNC: 15.5 G/DL (ref 12–16)
HYALINE CASTS #/AREA URNS LPF: ABNORMAL /LPF
HYALINE CASTS #/AREA URNS LPF: ABNORMAL /LPF
IMM GRANULOCYTES # BLD AUTO: 0.01 K/UL (ref 0–0.11)
IMM GRANULOCYTES NFR BLD AUTO: 0.1 % (ref 0–0.9)
KETONES UR STRIP.AUTO-MCNC: 40 MG/DL
KETONES UR STRIP.AUTO-MCNC: 40 MG/DL
LEUKOCYTE ESTERASE UR QL STRIP.AUTO: ABNORMAL
LEUKOCYTE ESTERASE UR QL STRIP.AUTO: ABNORMAL
LIPASE SERPL-CCNC: 18 U/L (ref 11–82)
LYMPHOCYTES # BLD AUTO: 2.42 K/UL (ref 1–4.8)
LYMPHOCYTES NFR BLD: 34.3 % (ref 22–41)
MCH RBC QN AUTO: 27.4 PG (ref 27–33)
MCHC RBC AUTO-ENTMCNC: 33 G/DL (ref 32.2–35.5)
MCV RBC AUTO: 83 FL (ref 81.4–97.8)
MICRO URNS: ABNORMAL
MICRO URNS: ABNORMAL
MONOCYTES # BLD AUTO: 0.33 K/UL (ref 0–0.85)
MONOCYTES NFR BLD AUTO: 4.7 % (ref 0–13.4)
MUCOUS THREADS #/AREA URNS HPF: ABNORMAL /HPF
NEUTROPHILS # BLD AUTO: 4 K/UL (ref 1.82–7.42)
NEUTROPHILS NFR BLD: 56.7 % (ref 44–72)
NITRITE UR QL STRIP.AUTO: NEGATIVE
NITRITE UR QL STRIP.AUTO: NEGATIVE
NRBC # BLD AUTO: 0 K/UL
NRBC BLD-RTO: 0 /100 WBC (ref 0–0.2)
PH UR STRIP.AUTO: 6 [PH] (ref 5–8)
PH UR STRIP.AUTO: 6.5 [PH] (ref 5–8)
PLATELET # BLD AUTO: 329 K/UL (ref 164–446)
PMV BLD AUTO: 9.8 FL (ref 9–12.9)
POTASSIUM SERPL-SCNC: 3.9 MMOL/L (ref 3.6–5.5)
PROT SERPL-MCNC: 7.6 G/DL (ref 6–8.2)
PROT UR QL STRIP: NEGATIVE MG/DL
PROT UR QL STRIP: NEGATIVE MG/DL
RBC # BLD AUTO: 5.65 M/UL (ref 4.2–5.4)
RBC # URNS HPF: ABNORMAL /HPF
RBC # URNS HPF: ABNORMAL /HPF
RBC UR QL AUTO: ABNORMAL
RBC UR QL AUTO: ABNORMAL
SODIUM SERPL-SCNC: 138 MMOL/L (ref 135–145)
SP GR UR STRIP.AUTO: 1.02
SP GR UR STRIP.AUTO: 1.02
UROBILINOGEN UR STRIP.AUTO-MCNC: 0.2 MG/DL
UROBILINOGEN UR STRIP.AUTO-MCNC: 0.2 MG/DL
WBC # BLD AUTO: 7.1 K/UL (ref 4.8–10.8)
WBC #/AREA URNS HPF: ABNORMAL /HPF
WBC #/AREA URNS HPF: ABNORMAL /HPF

## 2024-03-08 PROCEDURE — 80053 COMPREHEN METABOLIC PANEL: CPT

## 2024-03-08 PROCEDURE — 96376 TX/PRO/DX INJ SAME DRUG ADON: CPT

## 2024-03-08 PROCEDURE — 81001 URINALYSIS AUTO W/SCOPE: CPT

## 2024-03-08 PROCEDURE — 85025 COMPLETE CBC W/AUTO DIFF WBC: CPT

## 2024-03-08 PROCEDURE — 700111 HCHG RX REV CODE 636 W/ 250 OVERRIDE (IP): Mod: JZ | Performed by: EMERGENCY MEDICINE

## 2024-03-08 PROCEDURE — 83690 ASSAY OF LIPASE: CPT

## 2024-03-08 PROCEDURE — 96375 TX/PRO/DX INJ NEW DRUG ADDON: CPT

## 2024-03-08 PROCEDURE — 700105 HCHG RX REV CODE 258: Performed by: EMERGENCY MEDICINE

## 2024-03-08 PROCEDURE — 99285 EMERGENCY DEPT VISIT HI MDM: CPT

## 2024-03-08 PROCEDURE — 36415 COLL VENOUS BLD VENIPUNCTURE: CPT

## 2024-03-08 PROCEDURE — RXMED WILLOW AMBULATORY MEDICATION CHARGE: Performed by: EMERGENCY MEDICINE

## 2024-03-08 PROCEDURE — 96374 THER/PROPH/DIAG INJ IV PUSH: CPT

## 2024-03-08 PROCEDURE — 74176 CT ABD & PELVIS W/O CONTRAST: CPT

## 2024-03-08 PROCEDURE — 84703 CHORIONIC GONADOTROPIN ASSAY: CPT

## 2024-03-08 PROCEDURE — 87086 URINE CULTURE/COLONY COUNT: CPT

## 2024-03-08 RX ORDER — HYDROCODONE BITARTRATE AND ACETAMINOPHEN 5; 325 MG/1; MG/1
TABLET ORAL
Qty: 15 TABLET | Refills: 0 | Status: SHIPPED | OUTPATIENT
Start: 2024-03-08 | End: 2024-03-27

## 2024-03-08 RX ORDER — ONDANSETRON 4 MG/1
4 TABLET, ORALLY DISINTEGRATING ORAL EVERY 8 HOURS PRN
Qty: 10 TABLET | Refills: 0 | Status: SHIPPED | OUTPATIENT
Start: 2024-03-08 | End: 2024-03-27

## 2024-03-08 RX ORDER — TAMSULOSIN HYDROCHLORIDE 0.4 MG/1
0.4 CAPSULE ORAL
Qty: 10 CAPSULE | Refills: 0 | Status: SHIPPED | OUTPATIENT
Start: 2024-03-08 | End: 2024-03-27

## 2024-03-08 RX ORDER — HYDROCODONE BITARTRATE AND ACETAMINOPHEN 5; 325 MG/1; MG/1
1-2 TABLET ORAL EVERY 6 HOURS PRN
Qty: 15 TABLET | Refills: 0 | Status: SHIPPED | OUTPATIENT
Start: 2024-03-08 | End: 2024-03-11

## 2024-03-08 RX ORDER — ACETAMINOPHEN 10 MG/ML
1000 INJECTION, SOLUTION INTRAVENOUS ONCE
Status: COMPLETED | OUTPATIENT
Start: 2024-03-08 | End: 2024-03-08

## 2024-03-08 RX ORDER — MORPHINE SULFATE 4 MG/ML
4 INJECTION INTRAVENOUS
Status: DISCONTINUED | OUTPATIENT
Start: 2024-03-08 | End: 2024-03-08 | Stop reason: HOSPADM

## 2024-03-08 RX ORDER — KETOROLAC TROMETHAMINE 15 MG/ML
15 INJECTION, SOLUTION INTRAMUSCULAR; INTRAVENOUS ONCE
Status: COMPLETED | OUTPATIENT
Start: 2024-03-08 | End: 2024-03-08

## 2024-03-08 RX ORDER — TAMSULOSIN HYDROCHLORIDE 0.4 MG/1
CAPSULE ORAL
Qty: 10 CAPSULE | Refills: 0 | Status: SHIPPED | OUTPATIENT
Start: 2024-03-08 | End: 2024-03-27

## 2024-03-08 RX ORDER — ONDANSETRON 2 MG/ML
4 INJECTION INTRAMUSCULAR; INTRAVENOUS ONCE
Status: COMPLETED | OUTPATIENT
Start: 2024-03-08 | End: 2024-03-08

## 2024-03-08 RX ORDER — SODIUM CHLORIDE, SODIUM LACTATE, POTASSIUM CHLORIDE, CALCIUM CHLORIDE 600; 310; 30; 20 MG/100ML; MG/100ML; MG/100ML; MG/100ML
1000 INJECTION, SOLUTION INTRAVENOUS ONCE
Status: COMPLETED | OUTPATIENT
Start: 2024-03-08 | End: 2024-03-08

## 2024-03-08 RX ORDER — ONDANSETRON 4 MG/1
TABLET, ORALLY DISINTEGRATING ORAL
Qty: 10 TABLET | Refills: 0 | Status: SHIPPED | OUTPATIENT
Start: 2024-03-08 | End: 2024-03-27

## 2024-03-08 RX ADMIN — MORPHINE SULFATE 4 MG: 4 INJECTION, SOLUTION INTRAMUSCULAR; INTRAVENOUS at 10:24

## 2024-03-08 RX ADMIN — ONDANSETRON 4 MG: 2 INJECTION INTRAMUSCULAR; INTRAVENOUS at 11:41

## 2024-03-08 RX ADMIN — ONDANSETRON 4 MG: 2 INJECTION INTRAMUSCULAR; INTRAVENOUS at 10:25

## 2024-03-08 RX ADMIN — KETOROLAC TROMETHAMINE 15 MG: 15 INJECTION, SOLUTION INTRAMUSCULAR; INTRAVENOUS at 11:18

## 2024-03-08 RX ADMIN — ACETAMINOPHEN 1000 MG: 1000 INJECTION INTRAVENOUS at 13:34

## 2024-03-08 RX ADMIN — MORPHINE SULFATE 4 MG: 4 INJECTION, SOLUTION INTRAMUSCULAR; INTRAVENOUS at 12:19

## 2024-03-08 RX ADMIN — SODIUM CHLORIDE, POTASSIUM CHLORIDE, SODIUM LACTATE AND CALCIUM CHLORIDE 1000 ML: 600; 310; 30; 20 INJECTION, SOLUTION INTRAVENOUS at 10:25

## 2024-03-08 RX ADMIN — KETOROLAC TROMETHAMINE 15 MG: 15 INJECTION, SOLUTION INTRAMUSCULAR; INTRAVENOUS at 12:55

## 2024-03-08 ASSESSMENT — FIBROSIS 4 INDEX: FIB4 SCORE: 0.5

## 2024-03-08 ASSESSMENT — PAIN DESCRIPTION - PAIN TYPE: TYPE: ACUTE PAIN

## 2024-03-08 NOTE — ED TRIAGE NOTES
Chief Complaint   Patient presents with    Abdominal Pain     Pt states that she has sharp L flank pain with associated n/v

## 2024-03-08 NOTE — ED NOTES
Pt discharged, all appropriate hospital equipment removed (IV, monitor, pulse ox, etc.). Pt left unit via walking with stable gait to ED lobby for transport home with neighbor. Personal belongings with pt when leaving unit. Pt given discharge instructions prior to leaving unit including where to  prescriptions and when to follow-up; verbalizes understanding. Pt informed to return to ED if symptoms worsen/return or altered status develop. Copy of discharge instructions signed and turned into DC basket and copy sent with pt.

## 2024-03-08 NOTE — ED NOTES
Mery by wheelchair for comfort to room, gowned, and placed on monitors (BP, Pulse Ox). Patient is fall risk. Standard fall risk precautions in place including bed in lowest position, side rails up, call light within reach, and area free of clutter. Stable on RA. Chart up for ERP.

## 2024-03-08 NOTE — ED PROVIDER NOTES
"ED PHYSICIAN NOTE    CHIEF COMPLAINT  Chief Complaint   Patient presents with    Abdominal Pain     Pt states that she has sharp L flank pain with associated n/v       EXTERNAL RECORDS REVIEWED  Outpatient Notes patient seen at urgent care with wrist pain.  Diagnosed with tendinitis.  Placed in a splint and given naproxen.    HPI/ROS      Deisy Gilbert is a 33 y.o. female who presents left-sided flank pain.  Severe radiates to the left abdomen left lower quadrant.  Associated sweats nausea vomiting.  Normal bowel movements.  No dysuria hematuria.  Patient has a history of kidney stones but they have been on the right side and she is never had pain this bad.    PAST MEDICAL HISTORY  Past Medical History:   Diagnosis Date    Graves disease 08/18/2023    History of kidney stones        SOCIAL HISTORY  Social History     Tobacco Use    Smoking status: Former     Types: Cigarettes    Smokeless tobacco: Never   Vaping Use    Vaping Use: Never used   Substance Use Topics    Alcohol use: Not Currently    Drug use: Not Currently       CURRENT MEDICATIONS  Home Medications    **Home medications have not yet been reviewed for this encounter**         ALLERGIES  Allergies   Allergen Reactions    Lactose      Other reaction(s): GI Intolerance       PHYSICAL EXAM  VITAL SIGNS: BP (!) 188/99   Pulse 79   Temp 36.3 °C (97.3 °F) (Temporal)   Resp 20   Ht 1.549 m (5' 1\")   Wt 82.1 kg (181 lb)   LMP 01/22/2024   SpO2 97%   BMI 34.20 kg/m²    Constitutional: Awake and alert.  Uncomfortable appearing  HENT: Normal inspection  Eyes: Normal inspection  Neck: Grossly normal range of motion.  Cardiovascular: Normal heart rate, Normal rhythm.  Symmetric peripheral pulses.   Thorax & Lungs: No respiratory distress, No wheezing, No rales, No rhonchi, No chest tenderness.   Abdomen: Bowel sounds normal, soft, non-distended, her left abdomen.  No peritonitis  Skin: No obvious rash.  Back: No tenderness, No CVA tenderness. "   Extremities: No clubbing, cyanosis, edema, no Homans or cords.      DIAGNOSTIC STUDIES / PROCEDURES  LABS/EKG  Results for orders placed or performed during the hospital encounter of 03/08/24   CBC WITH DIFFERENTIAL   Result Value Ref Range    WBC 7.1 4.8 - 10.8 K/uL    RBC 5.65 (H) 4.20 - 5.40 M/uL    Hemoglobin 15.5 12.0 - 16.0 g/dL    Hematocrit 46.9 37.0 - 47.0 %    MCV 83.0 81.4 - 97.8 fL    MCH 27.4 27.0 - 33.0 pg    MCHC 33.0 32.2 - 35.5 g/dL    RDW 41.9 35.9 - 50.0 fL    Platelet Count 329 164 - 446 K/uL    MPV 9.8 9.0 - 12.9 fL    Neutrophils-Polys 56.70 44.00 - 72.00 %    Lymphocytes 34.30 22.00 - 41.00 %    Monocytes 4.70 0.00 - 13.40 %    Eosinophils 3.40 0.00 - 6.90 %    Basophils 0.80 0.00 - 1.80 %    Immature Granulocytes 0.10 0.00 - 0.90 %    Nucleated RBC 0.00 0.00 - 0.20 /100 WBC    Neutrophils (Absolute) 4.00 1.82 - 7.42 K/uL    Lymphs (Absolute) 2.42 1.00 - 4.80 K/uL    Monos (Absolute) 0.33 0.00 - 0.85 K/uL    Eos (Absolute) 0.24 0.00 - 0.51 K/uL    Baso (Absolute) 0.06 0.00 - 0.12 K/uL    Immature Granulocytes (abs) 0.01 0.00 - 0.11 K/uL    NRBC (Absolute) 0.00 K/uL   COMP METABOLIC PANEL   Result Value Ref Range    Sodium 138 135 - 145 mmol/L    Potassium 3.9 3.6 - 5.5 mmol/L    Chloride 106 96 - 112 mmol/L    Co2 17 (L) 20 - 33 mmol/L    Anion Gap 15.0 7.0 - 16.0    Glucose 101 (H) 65 - 99 mg/dL    Bun 9 8 - 22 mg/dL    Creatinine 0.57 0.50 - 1.40 mg/dL    Calcium 9.0 8.5 - 10.5 mg/dL    Correct Calcium 8.8 8.5 - 10.5 mg/dL    AST(SGOT) 23 12 - 45 U/L    ALT(SGPT) 26 2 - 50 U/L    Alkaline Phosphatase 132 (H) 30 - 99 U/L    Total Bilirubin 0.4 0.1 - 1.5 mg/dL    Albumin 4.3 3.2 - 4.9 g/dL    Total Protein 7.6 6.0 - 8.2 g/dL    Globulin 3.3 1.9 - 3.5 g/dL    A-G Ratio 1.3 g/dL   LIPASE   Result Value Ref Range    Lipase 18 11 - 82 U/L   HCG QUAL SERUM   Result Value Ref Range    Beta-Hcg Qualitative Serum Negative Negative   URINALYSIS,CULTURE IF INDICATED    Specimen: Urine   Result Value  Ref Range    Color Yellow     Character Clear     Specific Gravity 1.018 <1.035    Ph 6.0 5.0 - 8.0    Glucose Negative Negative mg/dL    Ketones 40 (A) Negative mg/dL    Protein Negative Negative mg/dL    Bilirubin Negative Negative    Urobilinogen, Urine 0.2 Negative    Nitrite Negative Negative    Leukocyte Esterase Moderate (A) Negative    Occult Blood Small (A) Negative    Micro Urine Req Microscopic    ESTIMATED GFR   Result Value Ref Range    GFR (CKD-EPI) 123 >60 mL/min/1.73 m 2   URINE MICROSCOPIC (W/UA)   Result Value Ref Range    WBC 10-20 (A) /hpf    RBC 5-10 (A) /hpf    Bacteria Moderate (A) None /hpf    Epithelial Cells Few /hpf    Mucous Threads Moderate /hpf    Hyaline Cast 0-2 /lpf   URINALYSIS    Specimen: Urine   Result Value Ref Range    Color Yellow     Character Clear     Specific Gravity 1.016 <1.035    Ph 6.5 5.0 - 8.0    Glucose Negative Negative mg/dL    Ketones 40 (A) Negative mg/dL    Protein Negative Negative mg/dL    Bilirubin Negative Negative    Urobilinogen, Urine 0.2 Negative    Nitrite Negative Negative    Leukocyte Esterase Trace (A) Negative    Occult Blood Trace (A) Negative    Micro Urine Req Microscopic    URINE MICROSCOPIC (W/UA)   Result Value Ref Range    WBC 2-5 /hpf    RBC 2-5 (A) /hpf    Bacteria Few (A) None /hpf    Epithelial Cells Few /hpf    Hyaline Cast 0-2 /lpf          RADIOLOGY  I have independently interpreted the diagnostic imaging associated with this visit and am waiting the final reading from the radiologist.   My preliminary interpretation is as follows: CT renal colic with left distal ureteral stone  Radiologist interpretation:   CT-RENAL COLIC EVALUATION(A/P W/O)    (Results Pending)         COURSE & MEDICAL DECISION MAKING    INITIAL ASSESSMENT, COURSE AND PLAN  Care Narrative: Patient presents with left-sided abdominal pain and flank pain.  History is most compatible with kidney stone but she does have tenderness of her abdominal exam and reports this  is worse than prior episodes.  Ordered laboratory data and imaging.  Treat with IV fluids.  Give morphine and Zofran.    Patient is not pregnant ordered Toradol.    CBC without leukocytosis or left shift.  CMP unremarkable aside from minimally low CO2.  Initial urinalysis with moderate leukocyte Estrace bacteria and small blood.  There are epithelial cells mucus threads so I think this is contaminated.  Order second UA.    Second UA trace leukocyte Estrace 2-5 whites.  Not compatible with UTI.    CT renal colic study does show left-sided ureteral stone.    Patient with persistent pain given additional morphine.    Still having pain given second dose of Toradol and IV Tylenol.    Pain resolved.    At this point the patient stable for outpatient management.  I have written prescriptions for Norco, Zofran, Flomax.  Advised push fluids.  I gave patient's strict precautions to return for fever or uncontrolled pain.  She has an appointment with urologist in April; however, I advised that if she has persistent pain she should be seen sooner than that.  Patient voiced understanding discharged in satisfactory condition.    HYDRATION: Based on the patient's presentation of Other kidney stone for medical expulsive therapy the patient was given IV fluids. IV Hydration was used because oral hydration was not as rapid as required. Upon recheck following hydration, the patient was improved.      ADDITIONAL PROBLEM LIST  Past Medical History:   Diagnosis Date    Graves disease 08/18/2023    History of kidney stones        DISPOSITION AND DISCUSSIONS    Escalation of care considered, and ultimately not performed:acute inpatient care management, however at this time, the patient is most appropriate for outpatient management        Prescription drugs considered and/or prescribed: Norco, Zofran, Flomax    FINAL IMPRESSION  1.  Kidney stone    This dictation was created using voice recognition software. The accuracy of the dictation is  limited to the abilities of the software. I expect there may be some errors of grammar and possibly content. The nursing notes were reviewed and certain aspects of this information were incorporated into this note.    Electronically signed by: Adria Dutta M.D., 3/8/2024

## 2024-03-10 DIAGNOSIS — E05.90 HYPERTHYROIDISM: ICD-10-CM

## 2024-03-10 LAB
BACTERIA UR CULT: NORMAL
SIGNIFICANT IND 70042: NORMAL
SITE SITE: NORMAL
SOURCE SOURCE: NORMAL

## 2024-03-11 ENCOUNTER — HOSPITAL ENCOUNTER (OUTPATIENT)
Dept: LAB | Facility: MEDICAL CENTER | Age: 34
End: 2024-03-11
Attending: STUDENT IN AN ORGANIZED HEALTH CARE EDUCATION/TRAINING PROGRAM
Payer: COMMERCIAL

## 2024-03-11 DIAGNOSIS — E05.00 GRAVES DISEASE: ICD-10-CM

## 2024-03-11 DIAGNOSIS — R53.83 FATIGUE, UNSPECIFIED TYPE: ICD-10-CM

## 2024-03-11 DIAGNOSIS — E55.9 VITAMIN D DEFICIENCY: ICD-10-CM

## 2024-03-11 DIAGNOSIS — B35.1 ONYCHOMYCOSIS: ICD-10-CM

## 2024-03-11 LAB
25(OH)D3 SERPL-MCNC: 15 NG/ML (ref 30–100)
ALBUMIN SERPL BCP-MCNC: 4.3 G/DL (ref 3.2–4.9)
ALBUMIN/GLOB SERPL: 1.4 G/DL
ALP SERPL-CCNC: 126 U/L (ref 30–99)
ALT SERPL-CCNC: 28 U/L (ref 2–50)
ANION GAP SERPL CALC-SCNC: 13 MMOL/L (ref 7–16)
AST SERPL-CCNC: 28 U/L (ref 12–45)
BILIRUB SERPL-MCNC: 0.3 MG/DL (ref 0.1–1.5)
BUN SERPL-MCNC: 8 MG/DL (ref 8–22)
CALCIUM ALBUM COR SERPL-MCNC: 8.8 MG/DL (ref 8.5–10.5)
CALCIUM SERPL-MCNC: 9 MG/DL (ref 8.5–10.5)
CHLORIDE SERPL-SCNC: 104 MMOL/L (ref 96–112)
CHOLEST SERPL-MCNC: 196 MG/DL (ref 100–199)
CO2 SERPL-SCNC: 21 MMOL/L (ref 20–33)
CREAT SERPL-MCNC: 0.66 MG/DL (ref 0.5–1.4)
ERYTHROCYTE [DISTWIDTH] IN BLOOD BY AUTOMATED COUNT: 42.5 FL (ref 35.9–50)
FASTING STATUS PATIENT QL REPORTED: NORMAL
GFR SERPLBLD CREATININE-BSD FMLA CKD-EPI: 118 ML/MIN/1.73 M 2
GLOBULIN SER CALC-MCNC: 3.1 G/DL (ref 1.9–3.5)
GLUCOSE SERPL-MCNC: 87 MG/DL (ref 65–99)
HCT VFR BLD AUTO: 47.8 % (ref 37–47)
HDLC SERPL-MCNC: 45 MG/DL
HGB BLD-MCNC: 15.3 G/DL (ref 12–16)
LDLC SERPL CALC-MCNC: 130 MG/DL
MCH RBC QN AUTO: 27.5 PG (ref 27–33)
MCHC RBC AUTO-ENTMCNC: 32 G/DL (ref 32.2–35.5)
MCV RBC AUTO: 85.8 FL (ref 81.4–97.8)
PLATELET # BLD AUTO: 317 K/UL (ref 164–446)
PMV BLD AUTO: 10.5 FL (ref 9–12.9)
POTASSIUM SERPL-SCNC: 4.2 MMOL/L (ref 3.6–5.5)
PROT SERPL-MCNC: 7.4 G/DL (ref 6–8.2)
RBC # BLD AUTO: 5.57 M/UL (ref 4.2–5.4)
SODIUM SERPL-SCNC: 138 MMOL/L (ref 135–145)
TRIGL SERPL-MCNC: 106 MG/DL (ref 0–149)
VIT B12 SERPL-MCNC: 265 PG/ML (ref 211–911)
WBC # BLD AUTO: 6.5 K/UL (ref 4.8–10.8)

## 2024-03-11 PROCEDURE — 85027 COMPLETE CBC AUTOMATED: CPT

## 2024-03-11 PROCEDURE — 82306 VITAMIN D 25 HYDROXY: CPT

## 2024-03-11 PROCEDURE — 80061 LIPID PANEL: CPT

## 2024-03-11 PROCEDURE — 36415 COLL VENOUS BLD VENIPUNCTURE: CPT

## 2024-03-11 PROCEDURE — 80053 COMPREHEN METABOLIC PANEL: CPT

## 2024-03-11 PROCEDURE — 82607 VITAMIN B-12: CPT

## 2024-03-11 RX ORDER — PROPRANOLOL HYDROCHLORIDE 10 MG/1
10 TABLET ORAL 3 TIMES DAILY
Qty: 270 TABLET | Refills: 1 | Status: SHIPPED | OUTPATIENT
Start: 2024-03-11

## 2024-03-14 ENCOUNTER — OFFICE VISIT (OUTPATIENT)
Dept: SPORTS MEDICINE | Facility: OTHER | Age: 34
End: 2024-03-14
Attending: NURSE PRACTITIONER
Payer: COMMERCIAL

## 2024-03-14 VITALS
SYSTOLIC BLOOD PRESSURE: 118 MMHG | DIASTOLIC BLOOD PRESSURE: 82 MMHG | OXYGEN SATURATION: 96 % | RESPIRATION RATE: 16 BRPM | WEIGHT: 181 LBS | BODY MASS INDEX: 34.17 KG/M2 | TEMPERATURE: 98.3 F | HEIGHT: 61 IN | HEART RATE: 84 BPM

## 2024-03-14 DIAGNOSIS — G89.29 CHRONIC PAIN OF LEFT KNEE: ICD-10-CM

## 2024-03-14 DIAGNOSIS — G56.22 NEURITIS OF LEFT ULNAR NERVE: ICD-10-CM

## 2024-03-14 DIAGNOSIS — M25.562 CHRONIC PAIN OF LEFT KNEE: ICD-10-CM

## 2024-03-14 PROCEDURE — 3079F DIAST BP 80-89 MM HG: CPT | Performed by: FAMILY MEDICINE

## 2024-03-14 PROCEDURE — 99214 OFFICE O/P EST MOD 30 MIN: CPT | Performed by: FAMILY MEDICINE

## 2024-03-14 PROCEDURE — 3074F SYST BP LT 130 MM HG: CPT | Performed by: FAMILY MEDICINE

## 2024-03-14 ASSESSMENT — ENCOUNTER SYMPTOMS
SHORTNESS OF BREATH: 0
FEVER: 0
VOMITING: 0
CHILLS: 0
DIZZINESS: 0
STIFFNESS: 1
NAUSEA: 0

## 2024-03-14 ASSESSMENT — FIBROSIS 4 INDEX: FIB4 SCORE: 0.55

## 2024-03-14 NOTE — Clinical Note
Soham Llamas, Thank you for referring Shabana to the sports medicine clinic for her hand issue. Fortunately, she is getting better with the splint.  We ordered some occupational/hand therapy and plan on seeing her back in a month to see how things are coming along. Hope you are well! Respectfully,  TANIA Robin M.D. [facility] [phone]

## 2024-03-14 NOTE — PROGRESS NOTES
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   Respiratory:  Negative for shortness of breath.    Cardiovascular:  Negative for chest pain.   Gastrointestinal:  Negative for nausea and vomiting.   Musculoskeletal:  Positive for joint pain.   Neurological:  Negative for dizziness.     PMH:  has a past medical history of Graves disease (08/18/2023) and History of kidney stones.  MEDS:   Current Outpatient Medications:     propranolol (INDERAL) 10 MG Tab, TAKE 1 TABLET BY MOUTH 3 TIMES A DAY, Disp: 270 Tablet, Rfl: 1    tamsulosin (FLOMAX) 0.4 MG capsule, Take 1 Capsule by mouth 1/2 hour after breakfast., Disp: 10 Capsule, Rfl: 0    ondansetron (ZOFRAN ODT) 4 MG TABLET DISPERSIBLE, Take 1 Tablet by mouth every 8 hours as needed for Nausea/Vomiting., Disp: 10  "Tablet, Rfl: 0    HYDROcodone-acetaminophen (NORCO) 5-325 MG Tab per tablet, Take 1-2 tablets orally every 6 hours as needed for pain, Disp: 15 Tablet, Rfl: 0    ondansetron (ZOFRAN ODT) 4 MG TABLET DISPERSIBLE, Take 1 tablet by mouth every 8 hours as needed for nausea/vomiting, Disp: 10 Tablet, Rfl: 0    tamsulosin (FLOMAX) 0.4 MG capsule, Take 1 capsule  by mouth 1/2 hour after breakfast, Disp: 10 Capsule, Rfl: 0    methimazole (TAPAZOLE) 10 MG Tab, Take 1.5 Tablets by mouth every day., Disp: 140 Tablet, Rfl: 4    fluticasone (FLONASE) 50 MCG/ACT nasal spray, Administer 1 Spray into affected nostril(S) every day., Disp: 16 g, Rfl: 0    ciclopirox (PENLAC) 8 % solution, Apply evenly over entire nail plate nightly to all affected nails., Disp: 6 mL, Rfl: 3    norgestrel-ethinyl estradiol (LOW-OGESTREL) 0.3-30 MG-MCG Tab, Take 1 Tablet by mouth every day., Disp: 84 Tablet, Rfl: 4    dicyclomine (BENTYL) 10 MG Cap, Take 1 Capsule by mouth 3 times a day as needed (colic pain)., Disp: 60 Capsule, Rfl: 0    Potassium Citrate 15 MEQ (1620 MG) Tab CR, , Disp: , Rfl:   ALLERGIES:   Allergies   Allergen Reactions    Lactose      Other reaction(s): GI Intolerance     SURGHX:   Past Surgical History:   Procedure Laterality Date    CYSTOSCOPY STENT PLACEMENT  2017     SOCHX:  reports that she has quit smoking. Her smoking use included cigarettes. She does not have any smokeless tobacco history on file. She reports that she does not currently use alcohol. She reports that she does not currently use drugs.  FH: Family history was reviewed, no pertinent findings to report    Objective   /82 (BP Location: Left arm, Patient Position: Sitting, BP Cuff Size: Adult)   Pulse 84   Temp 36.8 °C (98.3 °F) (Temporal)   Resp 16   Ht 1.549 m (5' 1\")   Wt 82.1 kg (181 lb)   SpO2 96%   BMI 34.20 kg/m²     Hand exam    NAD  Alert and oriented    BILATERAL ELBOW exam  Range of motion intact  No swelling  No tenderness the medial or " lateral epicondyle  Ndiaye test NEGATIVE    BILATERAL WRIST exam  Range of motion intact  No tenderness along scaphoid, TFCC insertion, distal radius or distal ulna  Tinel's testing is NEGATIVE  The hand is otherwise neurovascularly intact  Positive tenderness along the hypothenar eminence along with weakness with resisted on the RIGHT compared to the left finger abduction    1. Neuritis of left ulnar nerve (at the PALM/HYPOTHENAR eminence)  Referral to Occupational Therapy      2. Chronic pain of left knee          Initial onset, February 18, 2024  Had been weightlifting the day prior, without pain, but after bench pressing began having wrist pain  Pain is predominantly along the radial aspect of the RIGHT wrist in the region of the de Quervain's tendon sheath with radiation into the thumb    Fortunately, she seems to be responding nicely to splinting  Upon discovering her weakness with resisted finger abduction, suspect that she may be experiencing ulnar nerve compression at the hypothenar eminence with lifting  Recommended using padded gloves    Referral to OT hand, work on her weak  and overall hand function  Pad gloves for lifting weights    She also mentions some pain at the LEFT knee  Dominantly with getting up after sitting with her legs crossed for extended periods of time  Did not formally evaluate the knee at today's visit, but suspect she may have some patellofemoral syndrome  Provided with knee exercises to work on    Return in about 2 weeks (around 3/28/2024).  To see how she is doing with her thumb spica splint, use of padded gloves for lifting and see if she started occupational/hand therapy at that point  We can also formally address her LEFT knee pain if she has not seen improvement with her home exercise program for knee          2/22/2024 9:01 AM     HISTORY/REASON FOR EXAM:  Atraumatic Pain/Swelling/Deformity.        TECHNIQUE/EXAM DESCRIPTION AND NUMBER OF VIEWS:  4 views of the  RIGHT  wrist.     COMPARISON: None     FINDINGS:     MINERALIZATION: Mineralization is unremarkable for age.     INJURY: No acute fracture or gross malalignment is seen.     JOINTS: No erosive arthropathy is evident.        IMPRESSION:     No radiographic evidence of acute traumatic injury or bony erosion.           Exam Ended: 02/22/24  9:09 AM Last Resulted: 02/22/24  9:15 AM           Interpreted in the office today with the patient    Thank you DANYA Lakhani for allowing me to participate in caring for your patient.    Answers submitted by the patient for this visit:  Review of Systems (Submitted on 3/14/2024)  : Yes

## 2024-03-18 DIAGNOSIS — R10.83 ABDOMINAL COLIC: ICD-10-CM

## 2024-03-18 RX ORDER — DICYCLOMINE HYDROCHLORIDE 10 MG/1
10 CAPSULE ORAL 3 TIMES DAILY PRN
Qty: 60 CAPSULE | Refills: 1 | Status: SHIPPED | OUTPATIENT
Start: 2024-03-18 | End: 2024-03-27

## 2024-03-18 NOTE — TELEPHONE ENCOUNTER
Received request via: Pharmacy    Was the patient seen in the last year in this department? Yes    Does the patient have an active prescription (recently filled or refills available) for medication(s) requested? No    [facility]    Does the patient have senior living [facility] and need 100 day supply (blood pressure, diabetes and cholesterol meds only)? Patient does not have [facility]    Future Appointments         Provider Department [place]    3/27/2024 1:40 PM (Arrive by 1:25 PM) Nanci Lucero M.D. [facility]    4/12/2024 10:00 AM Kamilah Danielson M.D. [facility] Endocrinology [facility]    4/15/2024 11:45 AM Isidoro Robin M.D. [facility] Sports Medicine [facility]    4/17/2024 10:00 AM Landy Christiansen M.D. [facility] - Ophthalmology     9/4/2024 9:20 AM (Arrive by 9:05 AM) Nanci Lucero M.D. [facility]

## 2024-03-25 ENCOUNTER — OCCUPATIONAL THERAPY (OUTPATIENT)
Dept: OCCUPATIONAL THERAPY | Facility: REHABILITATION | Age: 34
End: 2024-03-25
Attending: FAMILY MEDICINE
Payer: COMMERCIAL

## 2024-03-25 DIAGNOSIS — G56.22 NEURITIS OF LEFT ULNAR NERVE: ICD-10-CM

## 2024-03-25 PROCEDURE — 97110 THERAPEUTIC EXERCISES: CPT

## 2024-03-25 PROCEDURE — 97165 OT EVAL LOW COMPLEX 30 MIN: CPT

## 2024-03-25 ASSESSMENT — ENCOUNTER SYMPTOMS
EXACERBATED BY: ACTIVITY
QUALITY: SHARP
PAIN SCALE: 6
ALLEVIATING FACTORS: REST
PAIN SCALE AT HIGHEST: 10
PAIN TIMING: OCCASIONAL
QUALITY: SHOOTING
PAIN SCALE AT LOWEST: 6

## 2024-03-25 NOTE — OP THERAPY EVALUATION
Outpatient Occupational Therapy  HAND THERAPY INITIAL EVALUATION    [facility] Occupational Therapy [address]  [address]  [place] [address]  Phone:  [phone]  Fax:  [phone]    Date of Evaluation: 2024    Patient: Deisy Gilbert  YOB: 1990  MRN: [id number]     Referring Provider: Isidoro Robin M.D.  [facility] [address] [facility] [place] [address]   Referring Diagnosis Neuritis of left ulnar nerve [G56.22]     Time Calculation    Start time: 0800  Stop time: 0845 Time Calculation (min): 45 minutes             Chief Complaint: Elbow Problem and Hand Weakness    Visit Diagnoses     ICD-10-CM   1. Neuritis of left ulnar nerve  G56.22       Subjective:   History of Present Illness:     Date of onset:  2024    Mechanism of injury:  As per Dr. 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 but she was never treated or evaluated for that back then  Pain is worse with wiping herself, gripping and lifting and even lifting her cat  Pain is sharp and intermittent  Uses her brace for nighttime and still has intermittent symptoms with sleeping on her RIGHT wrist/hand     Seen at urgent care for with a thumb spica splint    Prior level of function:  Works full time as a   Pain:     Current pain ratin    At best pain ratin    At worst pain rating:  10    Location:  Radial aspect of right wrist    Quality:  Sharp and shooting    Pain timing:  Occasional    Relieving  factors:  Rest    Aggravating factors:  Activity    Progression:  Improving    Pain Comments::  Slow improvement   Hand dominance:  Right  Diagnostic Tests:     X-ray: normal    Treatments:     Previous treatment:  Immobilization    Current treatment:  Occupational therapy  Patient Goals:     Patient goals for therapy:  Decreased pain, increased strength and independence with ADLs/IADLs      Past Medical History:   Diagnosis Date    Graves disease 08/18/2023    History of kidney stones      Past Surgical History:   Procedure Laterality Date    CYSTOSCOPY STENT PLACEMENT  2017     Social History     Tobacco Use    Smoking status: Former     Types: Cigarettes    Smokeless tobacco: Not on file   Substance Use Topics    Alcohol use: Not Currently     Family and Occupational History     Socioeconomic History    Marital status: Single     Spouse name: Not on file    Number of children: Not on file    Years of education: Not on file    Highest education level: Bachelor's degree (e.g., BA, AB, BS)   Occupational History    Not on file       Objective     Neurological Testing   Sensation   Wrist/Hand     Right   Intact: light touch, sharp/dull discrimination and hot/cold discrimination      Palpation   Right   No palpable tenderness to the extensor carpi radialis brevis.     Tenderness     Right Wrist/Hand   Tenderness in the first dorsal compartment. No tenderness in the common extensor tendon, intersection dorsal forearm area and lateral epicondyle.     Right Elbow   No tenderness in the common extensor tendon and lateral epicondyle.     Active Range of Motion     Right Wrist   Normal active range of motion    Right Thumb     Normal active range of motion    Right Digits   Normal active range of motion    Strength     Left Wrist/Hand      (2nd hand position)     Trial 1: 46    Right Wrist/Hand      (2nd hand position)     Trial 1: 35    Tests     Right Wrist/Hand   Positive Finkelstein's.   Negative scapholunate  shear, TFCC load and Tinel's sign (median nerve).     Right Elbow   Negative Tinel's sign (cubital tunnel).     General Comments     Wrist/Hand Comments  9 hole peg test:  R = 20 seconds  L = 20 seconds        Therapeutic Exercises (CPT 03731):     1. isolated strengthening of wrist RD and extension, 1# hand weight 1 x 10    2. isolated digit extesion strengthening    3. medium resistance theraputty for hand strengthening    4. Soft tissue stretches of wrist    5. Stretches for posture    Therapeutic Exercise Summary:  Initiated HEP and to complete 3 x a day  Recommended a neoprene thumb/wrist support to wear during tasks that may aggravate symptoms  Applied KT tape to radial aspect of thumb/wrist and circumferential at the wrist to minimize UD during functional tasks.        Time-based treatments/modalities:  Occupational Therapy Timed Treatment Charges  Therapeutic exercise minutes (CPT 39070): 15 minutes      Assessment and Plan:   Problem list/assessment: decreased HEP knowledge, decreased strength, limited ADL's and pain    Assessment details:  Patient is a 32 y/o female referred to OT for right wrist pain and did test positive with Finkelstein's test with tendonitis at radial aspect of wrist.  Patient would benefit from conservative treatment to minimize symptoms/limitation and enhance function to return to PLOF.   Barriers to therapy:  None    Goals:   Short Term Goals: decrease hypersensitivity, increase ADL independence, increase soft tissue/skin mobility, increase strength and independent with HEP performance  Short term goal timespan:  2-4 weeks    Long Term Goals:   Patient will ave at least 50 pounds of right  strength to enhance functional use  Patient will using compensatory strategies and use of supports during activities that may aggravate symptoms  Patient will score at least 70/80 on the UEFI  Patient will have 2/10 wrist pain during functional tasks and work related tasks.    Long term goal  timespan:  6-8 weeks    Plan:   Occupational/Hand Therapy options:  Occupational therapy treatment to continue  Planned therapy interventions:  Adaptive training to increase functional performance, home exercise training, patient/family/caregiver education, orthosis, AROM, A/AROM, PROM, passive stretch, graded resistive tasks to increase strength and pain management  Other planned therapy interventions:  11880 and 08753  Planned modality interventions: ice pack (CPT 74447) and moist hot pack (CPT 36351)  Prognosis: excellent    Frequency:  1x week  Duration in weeks:  8  Duration in visits:  8  Discussed with:  Patient  Patient/caregiver understanding of therapy treatment and goals: Good understanding of therapy treatment and goals      Functional Assessment Used  UEFI = 54/80        Referring provider co-signature:  I have reviewed this plan of care and my co-signature certifies the need for services.    Certification Period: 03/25/2024 to  05/20/24    Physician Signature: ________________________________ Date: ______________

## 2024-03-27 ENCOUNTER — OFFICE VISIT (OUTPATIENT)
Dept: MEDICAL GROUP | Facility: MEDICAL CENTER | Age: 34
End: 2024-03-27
Payer: COMMERCIAL

## 2024-03-27 VITALS
DIASTOLIC BLOOD PRESSURE: 80 MMHG | SYSTOLIC BLOOD PRESSURE: 120 MMHG | WEIGHT: 180.4 LBS | TEMPERATURE: 97 F | BODY MASS INDEX: 34.06 KG/M2 | HEIGHT: 61 IN | OXYGEN SATURATION: 95 %

## 2024-03-27 DIAGNOSIS — E78.00 ELEVATED LDL CHOLESTEROL LEVEL: ICD-10-CM

## 2024-03-27 DIAGNOSIS — E55.9 VITAMIN D DEFICIENCY: ICD-10-CM

## 2024-03-27 DIAGNOSIS — R53.83 FATIGUE, UNSPECIFIED TYPE: ICD-10-CM

## 2024-03-27 DIAGNOSIS — R51.9 ACUTE INTRACTABLE HEADACHE, UNSPECIFIED HEADACHE TYPE: ICD-10-CM

## 2024-03-27 DIAGNOSIS — E66.9 OBESITY (BMI 30.0-34.9): ICD-10-CM

## 2024-03-27 DIAGNOSIS — Z91.89 AT RISK FOR SLEEP APNEA: Primary | ICD-10-CM

## 2024-03-27 DIAGNOSIS — R74.8 ELEVATED ALKALINE PHOSPHATASE LEVEL: ICD-10-CM

## 2024-03-27 DIAGNOSIS — E53.8 LOW SERUM VITAMIN B12: ICD-10-CM

## 2024-03-27 RX ORDER — CYANOCOBALAMIN 1000 UG/ML
1000 INJECTION, SOLUTION INTRAMUSCULAR; SUBCUTANEOUS ONCE
Status: COMPLETED | OUTPATIENT
Start: 2024-03-27 | End: 2024-03-27

## 2024-03-27 RX ADMIN — CYANOCOBALAMIN 1000 MCG: 1000 INJECTION, SOLUTION INTRAMUSCULAR; SUBCUTANEOUS at 14:06

## 2024-03-27 ASSESSMENT — FIBROSIS 4 INDEX: FIB4 SCORE: 0.55

## 2024-03-27 NOTE — PROGRESS NOTES
"Subjective:     CC:   Chief Complaint   Patient presents with    Lab Results    Headache     Since march 9th in the mornings and at night      Fatigue     Feeling tired          HPI:   Deisy presents today with      Seen in ER recently due to renal colic   Going to see ophthalmologist to get vision checked as well     Headache - intermittent , usually in morning   Bilateral temple region. Ache , no throbbing   Photophobia and phonophobia present sometimes   Daytime sleepiness, fatigue     Flexor Tendinitis - right wrist - seeing occupational health      Labs reviewed.  Have low vitamin D levels at 15 patient reports chronic history of low vitamin D but due to the concern of having recurrent kidney stones she has not taken any vitamin D supplements so far.  Discussed with patient that she needs normal levels of vitamin D to have a better bone density.  Recommended to avoid calcium supplements and to take only vitamin D supplement.  1000 IU 5 days a week is reasonable.    Vitamin B12 is low normal but more borderline with positive symptoms of fatigue.   TSH is still low less than 0.005 with normal free T4 about 1.2.  Her methimazole dose was increased to 15 mg daily and has a follow-up appointment with endocrinology in a month with repeat labs.      Health Maintenance: Completed    ROS:  ROS    Review of systems unremarkable except for concerns noted by patient or items listed.    Please see HPI for additional ROS.      Objective:     Exam:  /80 (BP Location: Left arm, Patient Position: Sitting, BP Cuff Size: Adult)   Temp 36.1 °C (97 °F) (Temporal)   Ht 1.549 m (5' 1\")   Wt 81.8 kg (180 lb 6.4 oz)   LMP 03/18/2024   SpO2 95%   BMI 34.09 kg/m²  Body mass index is 34.09 kg/m².    Physical Exam  Constitutional:       Appearance: Normal appearance.   HENT:      Head: Normocephalic.      Mouth/Throat:      Mouth: Mucous membranes are moist.      Pharynx: Oropharynx is clear.   Eyes:      General: No scleral " icterus.  Cardiovascular:      Rate and Rhythm: Normal rate and regular rhythm.      Pulses: Normal pulses.      Heart sounds: Normal heart sounds.   Pulmonary:      Effort: Pulmonary effort is normal.      Breath sounds: Normal breath sounds.   Musculoskeletal:      Right lower leg: No edema.      Left lower leg: No edema.   Skin:     General: Skin is warm.   Neurological:      Mental Status: She is alert and oriented to person, place, and time.   Psychiatric:         Mood and Affect: Mood normal.         Behavior: Behavior normal.       Mallampati score class II or III      Labs: reviewed     Assessment & Plan:     33 y.o. female with the following -     1. Fatigue, unspecified type  Chronic, uncontrolled   Patient has low normal B 12 levels around 260.   Given on going fatigue discussed oral vs IM Vit B12   - cyanocobalamin (Vitamin B-12) injection 1,000 mcg    2. Low serum vitamin B12  Low , borderline vit B12 levels   Will consider  B12 inj now and  in 4 weeks   - cyanocobalamin (Vitamin B-12) injection 1,000 mcg    3. At risk for sleep apnea  Chronic fatigue   STOPBANG   3 (3/27/2024  1:52 PM)  Low risk but reasonable to test for sleep apnea given her symptoms  Plan:  - Overnight Home Sleep Study; Future    4. Acute intractable headache, unspecified headache type  Acute mild headache with intermittent similar episodes   Most likely tension headache or headache due to possible sleep apnea   Will get sleep study   Ok to take OTC pain medication for acute headache episode - tylenol or Excedrin BID prn      5. Vitamin D deficiency  Vit D deficiency at 15   Discussed with patient that take OTC vit D supplement 5964-2965 IU daily . No need for calcium supplement as it can increase the risk for renal stones.     6. Elevated LDL cholesterol level  Lab Results   Component Value Date/Time    CHOLSTRLTOT 196 03/11/2024 1138    TRIGLYCERIDE 106 03/11/2024 1138    HDL 45 03/11/2024 1138     (H) 03/11/2024 1138      Chronic   Possible due to weight and lifestyle vs thyroid dysfunction   Recommended low saturated diet and exercise     7. Obesity (BMI 30.0-34.9)  Chronic   - Patient identified as having weight management issue.  Appropriate orders and counseling given.    8. Elevated alkaline phosphatase level  Lab finding of high ALK phosphatases , other LFTs normal   Will check isoenzymes   - ALKALINE PHOSPHATASE ISOENZYMES; Future        Return in about 3 months (around 6/27/2024).    Please note that this dictation was created using voice recognition software. I have made every reasonable attempt to correct obvious errors, but I expect that there are errors of grammar and possibly content that I did not discover before finalizing the note.

## 2024-04-06 ENCOUNTER — HOSPITAL ENCOUNTER (OUTPATIENT)
Dept: LAB | Facility: MEDICAL CENTER | Age: 34
End: 2024-04-06
Attending: STUDENT IN AN ORGANIZED HEALTH CARE EDUCATION/TRAINING PROGRAM
Payer: COMMERCIAL

## 2024-04-06 DIAGNOSIS — R74.8 ELEVATED ALKALINE PHOSPHATASE LEVEL: ICD-10-CM

## 2024-04-06 DIAGNOSIS — E05.90 HYPERTHYROIDISM: ICD-10-CM

## 2024-04-06 LAB
T3 SERPL-MCNC: 149 NG/DL (ref 60–181)
T4 FREE SERPL-MCNC: 0.9 NG/DL (ref 0.93–1.7)
TSH SERPL DL<=0.005 MIU/L-ACNC: 3.18 UIU/ML (ref 0.38–5.33)

## 2024-04-06 PROCEDURE — 84480 ASSAY TRIIODOTHYRONINE (T3): CPT

## 2024-04-06 PROCEDURE — 84443 ASSAY THYROID STIM HORMONE: CPT

## 2024-04-06 PROCEDURE — 84075 ASSAY ALKALINE PHOSPHATASE: CPT

## 2024-04-06 PROCEDURE — 84080 ASSAY ALKALINE PHOSPHATASES: CPT

## 2024-04-06 PROCEDURE — 36415 COLL VENOUS BLD VENIPUNCTURE: CPT

## 2024-04-06 PROCEDURE — 84439 ASSAY OF FREE THYROXINE: CPT

## 2024-04-08 ENCOUNTER — OCCUPATIONAL THERAPY (OUTPATIENT)
Dept: OCCUPATIONAL THERAPY | Facility: REHABILITATION | Age: 34
End: 2024-04-08
Attending: FAMILY MEDICINE
Payer: COMMERCIAL

## 2024-04-08 DIAGNOSIS — G56.22 NEURITIS OF LEFT ULNAR NERVE: ICD-10-CM

## 2024-04-08 PROCEDURE — 97010 HOT OR COLD PACKS THERAPY: CPT

## 2024-04-08 PROCEDURE — 97110 THERAPEUTIC EXERCISES: CPT

## 2024-04-08 NOTE — PROGRESS NOTES
"Follow up Endocrinology Visit  Initial consult/last visit on: 9/7/23  Last visit on: 2/09/23  Referred by:  Nanci Lucero M.D.    Chief complaint:  Deisy Gilbert, is a very pleasant 32 y.o.female, who is here for follow up for Graves disease    Interval history:   - patient felt better after increasing dose of MMI, but later on feeling more fatigued, reports \"brain fog\"  - continues to have some L knee pain, follows rheumatologist who does not believe patient has MMI induced SLE  - noted blurry vision  Prior notes:  - since last visit feeling much better: tremor stopped, muscle weakness resolved, able to squat without any problem, currently using cane intermittently with back pains, which she associates with kidney stones  - diarrhea improved, but not resolved, patient asked for referral to GI specialist from PCP  - reports new right eye periorbital edema, gritty sensation, light sensitivity, ptosis, intermittent blurry vision, scheduled with neuro-ophthalmologist on 10/17/2023  - plans for surgical kidney stones removal on 10/18/2023  - reviewed with the patient new labs and thyroid US  11/09/23  - overall doing much better, however still feels very tired,  despite sleeping well  - gained couple lb since last visit  - still has diarrhea, even it much improved since initiation Rx with MMI; plans for GI evaluation  - had surgery for kidney stone removal, unfortunately, kidney stone was no evaluated for composition  - still has R eye ptosis and tearing, myasthenia gravis was ruled out  - followed by neuro-ophthalmologist, for now recommended active surveillance  - recent labs were reviewed  2/09/23  - patient developed disturbing knee pains, noted elevated MEGHAN, was seen by rheumatologist, there is a concern of possible MMI-induced SLE  - patient continues to feel fatigue  - eye \"are getting better\", she still feels gritty  sensation, uses artifical tears, denies blurry/double vision    ALEJANDRO symptoms:  Dry, gritty eye " " sensation - in R eye  Light sensitivity - in R eye  Tearing - no  Red eye - no  Proptosis -  R eye  Pain or pressure behind the eye - pressure behind R eye  Retroorbital pain - no  Pain with eye movement  - no   Eyelid retraction -  no  Eyelid swelling - R eye  Discomfort or pain in or behind the eye with looking L/R or up/down - no  Double vision - no  Blurry vision - intermittently  Color vision loss - no  Vision loss - no    Hyperthyroidism HPI:  - was feeling tired for many months  - she lost 11 lb but associated with recent loss of her friend  - since beginning of 8/2023 patient noted nausea, diarrhea (having up to 4 BMS/day, watery stools), later on she noted palpitations  - she also noted worsening of her anxiety, reports  insomnia, sweating/hot flashes, heaviness on her chest, hand tremors, muscle weakness to the point that she needs to use cane to walk - persistent  - on labs from 8/23/23 - biochemical evidence of hyperthyroidism + elevated TrAbs  - was started on MMI 30 mg/day + propranolol 10 mg TID - no improvement of symptoms so far  - nonsmoker  - Fhx - thyroid disease in her aunt, not sure about the diagnosis    Medications:  Current Outpatient Medications:     propranolol (INDERAL) 10 MG Tab, TAKE 1 TABLET BY MOUTH 3 TIMES A DAY, Disp: 270 Tablet, Rfl: 1    methimazole (TAPAZOLE) 10 MG Tab, Take 1.5 Tablets by mouth every day., Disp: 140 Tablet, Rfl: 4    ciclopirox (PENLAC) 8 % solution, Apply evenly over entire nail plate nightly to all affected nails., Disp: 6 mL, Rfl: 3    norgestrel-ethinyl estradiol (LOW-OGESTREL) 0.3-30 MG-MCG Tab, Take 1 Tablet by mouth every day., Disp: 84 Tablet, Rfl: 4    Potassium Citrate 15 MEQ (1620 MG) Tab CR, , Disp: , Rfl:     Physical Examination:   Vital signs: BP (!) 140/85   Pulse 85   Ht 1.549 m (5' 1\")   Wt 82.6 kg (182 lb)   LMP 03/18/2024   BMI 34.39 kg/m²   General: No distress, cooperative, well dressed and well nourished.   Resp: Normal effort.  CVS: " "Regular rate and rhythm.  Extremities: No edema bilateral extremities  Neuro: Alert and oriented.   Skin: No rash, No Ulcers  Psych: Normal mood and affect    Labs:  MOST RECENT LABS:  - reviewed   Latest Reference Range & Units 04/06/24 10:39   TSH 0.380 - 5.330 uIU/mL 3.180   Free T-4 0.93 - 1.70 ng/dL 0.90 (L)   T3 60.0 - 181.0 ng/dL 149.0     PRIOR PERTINENT LABS:  - reviewed                  Imaging:  - reviewed  Thyroid US on 10/2/2023:  HISTORY/REASON FOR EXAM:  Hyperthyroidism/Graves disease, thyroid gland enlargement.  TECHNIQUE/EXAM DESCRIPTION:  Ultrasound of the soft tissues of the head and neck.  COMPARISON:  None  FINDINGS:  The thyroid gland is heterogeneous.  Vascularity is increased.  The right lobe of the thyroid gland measures 2.63 cm x 4.56 cm x 2.41 cm. The contour and echogenicity are normal. No focal mass lesions are identified.  The left lobe of the thyroid gland measures 2.08 cm x 4.21 cm x 1.73 cm. The contour and echogenicity are normal. No focal mass lesions are identified.  The isthmus measures 0.54 cm.  IMPRESSION:  1.  Mildly enlarged heterogeneous thyroid gland with hyperemia. Findings are consistent with Graves' disease.    Assessment and Plan:  1. Graves disease      ALEJANDRO (R eye)      Thyroid myopathy, resolved      Iatrogenic hypothyroidism  - improvement of symptoms after increasing dose of MMI  during the last visit, but then feels more fatigued, \"brain fogged\"  - on labs iatrogenic hypothyroidism  - will taper down MMI 15 -> 10 mg  - repeat fT4 in 2 weeks, TSH in 2 mo  - patient noted worsening of ALEJANDRO symptoms, eye exam unremarkable but patient reports blurry vision with down gaze, plans to see neuroophthalmologist  Prior notes:  - clinical and biochemical evidence of hyperthyroidism + elevated TrAbs  - no clinical signs of ALEJANDRO but patient reports eye soreness and double vision - 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 - periodic hypokalemic paralysis and thyrotoxic periodic  paralysis are unlikely given persistent nature of muscle weakness vs myopathic attacks, already on propranolol - treatment for thyrotoxic periodic paralysis  - appropriately started on MMI and nonselective beta-blocker  - we discussed cause and natural history of the Graves disease, treatment approaches  - I explained that it takes up to 3-4 weeks to reach euthyroidism  - clinical and biochemical improvement of hyperthyroidism with normalization of free T4, TSH is still suppressed but likely is lagging in response  - we will decrease MMI to maintenance dose of 10 mg a day  - patient continues to have diarrhea, unlikely related to hyperthyroidism, celiac disease work-up was negative, agree with PCP with GI referral  - new symptoms consistent with right thyroid eye disease, referred to neuro-ophthalmologist  2/09/23  - on maintenance dose of MMI, fT4 wnl, TSH is still suppressed - likely indicates that patient needs higher dose of MMI  - concerns of MMI- induced SLE, follows rheumatologist  - had extensive discussion of possible approaches, there is a high risk of having SLE even with transition to PTU - will discuss with rheumatologist if that is reasonable, if neither of thionamides could be used -> consider surgical therapy, as GIRON might exacerbate ALEJANDRO  - discussed consequences of thyroidectomy and principles of replacement therapy   - patient will continue evaluation by rheumatology, will update me about plans, if transition to PTU is reasonable, if continues to take MMI -> consider increase the dose to 15 mg/day.   - ALEJANDRO improved    Plan:  Decrease dose of  MMI 15-> 10 mg/day  Repeat fT4 in 2 week, TSH/fT4 in 2 mo.   Follow neuro-ophthalmologist recommendations.  Meanwhile, use sunglasses, artificial teardrops, avoid primary and secondary smoking    RTC: 8 weeks  Total time (face-to-face and non-face-to face time):  30 min  Plan reviewed  with the patient and agreed with plan.  All questions answered to patient's satisfaction.  Thank you kindly for allowing me to participate in the care plan for this patient.    Kamilah Danielson MD    CC:   Nanci Lucero M.D.

## 2024-04-08 NOTE — OP THERAPY DAILY TREATMENT
Outpatient Occupational Therapy  DAILY TREATMENT     [facility] Occupational Therapy [address]  [address]  [place] [address]  Phone:  [phone]  Fax:  [phone]    Date: 04/08/2024    Patient: Deisy Gilbert  YOB: 1990  MRN: [id number]     Time Calculation  Start time: 0100  Stop time: 0145 Time Calculation (min): 45 minutes         Chief Complaint: Elbow Problem and Hand Weakness    Visit #: 2    SUBJECTIVE:  I am noticing an improvement and the pain is getting a bit better.  I wake up with more pain and I am going to start wearing splint at night to see if that helps.      OBJECTIVE:  Current objective measures:   Neurological Testing   Sensation   Wrist/Hand      Right   Intact: light touch, sharp/dull discrimination and hot/cold discrimination        Palpation   Right   No palpable tenderness to the extensor carpi radialis brevis.      Tenderness      Right Wrist/Hand   Tenderness in the first dorsal compartment. No tenderness in the common extensor tendon, intersection dorsal forearm area and lateral epicondyle.      Right Elbow   No tenderness in the common extensor tendon and lateral epicondyle.      Active Range of Motion   Intact     Strength     Left Wrist/Hand       (2nd hand position)     Trial 1: 54    Right Wrist/Hand       (2nd hand position)     Trial 1: 60     Tests      Right Wrist/Hand   Negative Finkelstein's.   Negative scapholunate shear, TFCC load and Tinel's sign (median nerve).      Right Elbow   Negative Tinel's sign (cubital tunnel).      General Comments      Wrist/Hand Comments  9 hole peg test:  R = 20 seconds  L = 20 seconds        Therapeutic Exercises (CPT 91433):     1. isolated strengthening of wrist RD and extension, 2#    2. isolated digit extesion strengthening    3. medium resistance theraputty for hand strengthening    4. Soft tissue stretches of wrist    5. flex bar - green - firm    Therapeutic Exercise Summary:  Has  purchased a neoprene thumb/ wrist support at the gym and to wear a more rigid support at night to minimize painful symptoms in the morning       Therapeutic Treatments and Modalities:    1. Hot or Cold Pack Therapy (CPT 42939)    Therapeutic Treatments and Modalities Summary: Heat treatment completed after there ex as part of pain management     Time-based treatments/modalities:  Therapeutic exercise minutes (CPT 22000): 30 minutes        Pain rating before treatment: 0  Pain rating after treatment: 5    ASSESSMENT:   Response to treatment: Patient progressing well in therapy and HEP.  Modifying her work station to minimize repetitive movements.  Negative Finkelstein's today     PLAN/RECOMMENDATIONS:   Plan for treatment: therapy treatment to continue next visit.  Planned interventions for next visit: continue with current treatment, hot/cold pack therapy (CPT 21368), therapeutic activities (CPT 88182), and therapeutic exercise (CPT 21233)

## 2024-04-09 LAB
ALP BONE SERPL-CCNC: 62 U/L (ref 0–55)
ALP ISOS SERPL HS-CCNC: 0 U/L
ALP LIVER SERPL-CCNC: 68 U/L (ref 0–94)
ALP SERPL-CCNC: 130 U/L (ref 40–120)

## 2024-04-12 ENCOUNTER — OFFICE VISIT (OUTPATIENT)
Dept: ENDOCRINOLOGY | Facility: MEDICAL CENTER | Age: 34
End: 2024-04-12
Attending: STUDENT IN AN ORGANIZED HEALTH CARE EDUCATION/TRAINING PROGRAM
Payer: COMMERCIAL

## 2024-04-12 VITALS
HEIGHT: 61 IN | SYSTOLIC BLOOD PRESSURE: 140 MMHG | DIASTOLIC BLOOD PRESSURE: 85 MMHG | HEART RATE: 85 BPM | WEIGHT: 182 LBS | BODY MASS INDEX: 34.36 KG/M2

## 2024-04-12 DIAGNOSIS — E05.90 HYPERTHYROIDISM: ICD-10-CM

## 2024-04-12 DIAGNOSIS — E03.2 IATROGENIC HYPOTHYROIDISM: ICD-10-CM

## 2024-04-12 PROCEDURE — 3079F DIAST BP 80-89 MM HG: CPT | Performed by: STUDENT IN AN ORGANIZED HEALTH CARE EDUCATION/TRAINING PROGRAM

## 2024-04-12 PROCEDURE — 99214 OFFICE O/P EST MOD 30 MIN: CPT | Performed by: STUDENT IN AN ORGANIZED HEALTH CARE EDUCATION/TRAINING PROGRAM

## 2024-04-12 PROCEDURE — 99211 OFF/OP EST MAY X REQ PHY/QHP: CPT | Performed by: STUDENT IN AN ORGANIZED HEALTH CARE EDUCATION/TRAINING PROGRAM

## 2024-04-12 PROCEDURE — 3077F SYST BP >= 140 MM HG: CPT | Performed by: STUDENT IN AN ORGANIZED HEALTH CARE EDUCATION/TRAINING PROGRAM

## 2024-04-12 ASSESSMENT — FIBROSIS 4 INDEX: FIB4 SCORE: 0.55

## 2024-04-17 ENCOUNTER — OFFICE VISIT (OUTPATIENT)
Dept: OPHTHALMOLOGY | Facility: MEDICAL CENTER | Age: 34
End: 2024-04-17
Payer: COMMERCIAL

## 2024-04-17 DIAGNOSIS — R51.9 NEW ONSET HEADACHE: ICD-10-CM

## 2024-04-17 DIAGNOSIS — H05.89 THYROID ORBITOPATHY: ICD-10-CM

## 2024-04-17 DIAGNOSIS — E07.9 THYROID ORBITOPATHY: ICD-10-CM

## 2024-04-17 DIAGNOSIS — E05.00 GRAVES DISEASE: ICD-10-CM

## 2024-04-17 PROBLEM — H02.401 PTOSIS OF RIGHT EYELID: Status: RESOLVED | Noted: 2023-10-17 | Resolved: 2024-04-17

## 2024-04-17 PROCEDURE — 99214 OFFICE O/P EST MOD 30 MIN: CPT | Mod: 25 | Performed by: STUDENT IN AN ORGANIZED HEALTH CARE EDUCATION/TRAINING PROGRAM

## 2024-04-17 PROCEDURE — 92133 CPTRZD OPH DX IMG PST SGM ON: CPT | Performed by: STUDENT IN AN ORGANIZED HEALTH CARE EDUCATION/TRAINING PROGRAM

## 2024-04-17 RX ORDER — MULTIVIT-MIN/IRON/FOLIC ACID/K 18-600-40
1000 CAPSULE ORAL DAILY
COMMUNITY

## 2024-04-17 ASSESSMENT — LAGOPHTHALMOS
OS_LAGOPHTHALMOS: 0
OD_LAGOPHTHALMOS: 0

## 2024-04-17 ASSESSMENT — VISUAL ACUITY
OD_SC: 20/20
METHOD: SNELLEN - LINEAR
CORRECTION_TYPE: GLASSES
OS_SC: 20/20

## 2024-04-17 ASSESSMENT — MARGIN REFLEX DISTANCE
OS_MRD2: 6
OD_MRD1: 3
OS_MRD1: 4
OD_MRD2: 7

## 2024-04-17 ASSESSMENT — ENCOUNTER SYMPTOMS
EYE PAIN: 1
HEADACHES: 1
BLURRED VISION: 1

## 2024-04-17 ASSESSMENT — CONF VISUAL FIELD
OD_INFERIOR_TEMPORAL_RESTRICTION: 0
OS_SUPERIOR_TEMPORAL_RESTRICTION: 0
OS_INFERIOR_NASAL_RESTRICTION: 0
OS_INFERIOR_TEMPORAL_RESTRICTION: 0
OS_NORMAL: 1
OD_NORMAL: 1
OD_SUPERIOR_NASAL_RESTRICTION: 0
OS_SUPERIOR_NASAL_RESTRICTION: 0
OD_INFERIOR_NASAL_RESTRICTION: 0
OD_SUPERIOR_TEMPORAL_RESTRICTION: 0

## 2024-04-17 ASSESSMENT — REFRACTION_MANIFEST
OD_AXIS: 100
METHOD_AUTOREFRACTION: 1
OS_CYLINDER: +0.75
OD_SPHERE: -1.25
OD_CYLINDER: +1.00
OS_AXIS: 076
OS_SPHERE: -0.50

## 2024-04-17 ASSESSMENT — REFRACTION_WEARINGRX
OS_SPHERE: -1.00
OD_AXIS: 018
OD_CYLINDER: -1.00
OS_AXIS: 163
OS_CYLINDER: -0.50
OD_SPHERE: -1.00
SPECS_TYPE: SVL

## 2024-04-17 ASSESSMENT — TONOMETRY
OS_IOP_MMHG: 19
IOP_METHOD: I-CARE
OD_IOP_MMHG: 20

## 2024-04-17 ASSESSMENT — SLIT LAMP EXAM - LIDS
COMMENTS: INFERIOR SCLERAL SHOW
COMMENTS: INFERIOR SCLERAL SHOW

## 2024-04-17 ASSESSMENT — EXTERNAL EXAM - LEFT EYE: OS_EXAM: NORMAL

## 2024-04-17 ASSESSMENT — EXTERNAL EXAM - RIGHT EYE: OD_EXAM: NORMAL

## 2024-04-17 ASSESSMENT — CUP TO DISC RATIO
OD_RATIO: 0.3
OS_RATIO: 0.3

## 2024-04-17 NOTE — ASSESSMENT & PLAN NOTE
Reports new onset headache after recent hospital admission 3/2024 fo renal stones. PCP working up for LANDY as cause    Onset: 3/2023 following admission for kidney stones   Characteristic: bitemporal pressure  Severity: 9/10  Associated: light sensitivity, sound sensitivity, rarely nausea  Duration: an hour  Frequency: daily    Plan:   Headache characteristic consistent with migraine without aura given severity and associated symptoms. Discussed trial of Magox 400mg daily to see if headaches improve. Continue work up and management with PCP

## 2024-04-17 NOTE — PROGRESS NOTES
Peds/Neuro Ophthalmology:   Landy Christiansen M.D.    Date & Time note created:    4/17/2024   11:06 AM     Referring MD / APRN:  Nanci Lucero M.D., No att. providers found    Patient ID:  Name:             Deisy Gilbert     YOB: 1990  Age:                 33 y.o.  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 She was last seen 10/17/23    At her last visit patient had a LEA score 1-2 with no optic nerve involvement. She was recommended to return for reevaluation in 6 months. Since her last visit patient has noticed improvement in the proptosis and swelling of her eyes. She reports her thyroid has been well controlled on methimazole and TSH has since normalized. T4 was mildly low at 0.98 therefore methimazole was recently decreased from 15mg to 10mg.She does notice that when staring at her computer or phone too long her vision becomes blurred and strained. She is able to blink away the blurred vision. She develops a headache after staring at the screen too long. She is being evaluated for sleep apnea because of these headaches. She continues to struggle with watery, red and itchy eyes, but admits to difficulty adhering to Refresh AT. She denies any allergies.  She denies any double vision. She denies any spontaneous eye pain or pain with eye movements.     Recent thyroid labs  TSH 3.18  T4 0.98     Endocrinology: Kamilah Danielson  Methimazole 10mg , weaning of propanolol   No thymectomy    Headache Characteristics  Onset: 3/2023 following admission for kidney stones   Characteristic: bitemporal pressure  Severity: 9/10  Associated: light sensitivity, sound sensitivity, rarely nausea  Duration: an hour  Frequency: daily        Review of Systems:  Review of Systems   Eyes:  Positive for blurred vision and pain.         ALEJANDRO OU  Watery and itchy eyes OU   Neurological:  Positive for headaches.   All other systems reviewed and are negative.      Past Medical History:   Past Medical History:   Diagnosis Date    Graves disease 08/18/2023    History of kidney stones        Past Surgical History:  Past Surgical History:   Procedure Laterality Date    STENT PLACEMENT  10/2023    CYSTOSCOPY STENT PLACEMENT  2017       Current Outpatient Medications:  Current Outpatient Medications   Medication Sig Dispense Refill    Vitamin D, Cholecalciferol, (CHOLECALCIFEROL) 25 MCG (1000 UT) Tab Take 1,000 Units by mouth every day.      methimazole (TAPAZOLE) 10 MG Tab Take 1.5 Tablets by mouth every day. 140 Tablet 4    ciclopirox (PENLAC) 8 % solution Apply evenly over entire nail plate nightly to all affected nails. 6 mL 3    norgestrel-ethinyl estradiol (LOW-OGESTREL) 0.3-30 MG-MCG Tab Take 1 Tablet by mouth every day. 84 Tablet 4    Potassium Citrate 15 MEQ (1620 MG) Tab CR       propranolol (INDERAL) 10 MG Tab TAKE 1 TABLET BY MOUTH 3 TIMES A  Tablet 1     No current facility-administered medications for this visit.       Allergies:  Allergies   Allergen Reactions    Lactose      Other reaction(s): GI Intolerance       Family History:  Family History   Problem Relation Age of Onset    Hypertension Mother     Eczema Father     No Known Problems Brother     Breast Cancer Paternal Aunt     Diabetes Paternal Uncle     Hypertension Maternal Grandmother        Social History:  Social History     Socioeconomic History    Marital status: Single     Spouse name: Not on file    Number of children: Not on file    Years of education: Not on file    Highest education level: Bachelor's degree (e.g., BA, AB, BS)   Occupational History    Not on file   Tobacco Use    Smoking status: Former     Types: Cigarettes    Smokeless tobacco: Never   Vaping Use    Vaping Use: Never used   Substance and Sexual Activity    Alcohol use: Not Currently    Drug use: Not  Currently    Sexual activity: Not on file   Other Topics Concern    Not on file   Social History Narrative    Senior tech artist      Social Determinants of Health     Financial Resource Strain: Low Risk  (8/21/2023)    Overall Financial Resource Strain (CARDIA)     Difficulty of Paying Living Expenses: Not very hard   Food Insecurity: No Food Insecurity (8/21/2023)    Hunger Vital Sign     Worried About Running Out of Food in the Last Year: Never true     Ran Out of Food in the Last Year: Never true   Transportation Needs: No Transportation Needs (8/21/2023)    PRAPARE - Transportation     Lack of Transportation (Medical): No     Lack of Transportation (Non-Medical): No   Physical Activity: Insufficiently Active (8/21/2023)    Exercise Vital Sign     Days of Exercise per Week: 3 days     Minutes of Exercise per Session: 30 min   Stress: Stress Concern Present (8/21/2023)    [employer] - Occupational Stress Questionnaire     Feeling of Stress : To some extent   Social Connections: Socially Isolated (8/21/2023)    Social Connection and Isolation Panel [NHANES]     Frequency of Communication with Friends and Family: More than three times a week     Frequency of Social Gatherings with Friends and Family: Patient declined     Attends Sikhism Services: Never     Active Member of Clubs or Organizations: No     Attends Club or Organization Meetings: Never     Marital Status: Never    Intimate Partner Violence: Not on file   Housing Stability: Low Risk  (8/21/2023)    Housing Stability Vital Sign     Unable to Pay for Housing in the Last Year: No     Number of Places Lived in the Last Year: 1     Unstable Housing in the Last Year: No          Physical Exam:  Physical Exam    Oriented x 3  Weight/BMI: There is no height or weight on file to calculate BMI.  There were no vitals taken for this visit.    Base Eye Exam       Visual Acuity (Snellen - Linear)         Right Left    Dist sc  20/20 20/20    Dist ph sc NI NI      Correction: Glasses              Tonometry (i-care, 10:17 AM)         Right Left    Pressure 20 19              Pupils         Pupils Dark Light Shape React APD    Right PERRL 3 2 Round Brisk None    Left PERRL 3 2 Round Brisk None              Visual Fields         Right Left     Full Full              Extraocular Movement         Right Left     Full, Ortho Full, Ortho              Neuro/Psych       Oriented x3: Yes    Mood/Affect: Normal                  Additional Tests       Color         Right Left    Ishihara 12/12 12/12              Stereo       Fly: +    Animals: 3/3    Circles: 8/9                  Slit Lamp and Fundus Exam       External Exam         Right Left    External Normal Normal    MRD1 3 mm 4 mm    MRD2 7 mm 6 mm    Inferior scleral show 2 mm 1 mm    Lagophthalmos 0 mm 0 mm              Exophthalmometry (Base: 120 mm)         Right Left    Corina 17 mm 17 mm              Slit Lamp Exam         Right Left    Lids/Lashes inferior scleral show inferior scleral show    Conjunctiva/Sclera White and quiet White and quiet    Cornea Clear Clear    Anterior Chamber Deep and quiet Deep and quiet    Iris Round and reactive Round and reactive    Lens Clear Clear              Fundus Exam         Right Left    Disc pink, sharp disc margins, flat pink, sharp disc margins, flat    C/D Ratio 0.3 0.3    Macula Normal Normal                  Refraction       Wearing Rx         Sphere Cylinder Axis    Right -1.00 -1.00 018    Left -1.00 -0.50 163      Age: 9m    Type: SVL              Manifest Refraction (Auto)         Sphere Cylinder Axis    Right -1.25 +1.00 100    Left -0.50 +0.75 076                    Pertinent Lab/Test/Imaging Review:  TSH 3.18 and T4 0.98L    Assessment and Plan:     Thyroid orbitopathy  Graves disease stable on Methimazole 10mg following with Dr Danielson    Currently denies any double vision, blurred vision, loss of vision. Reports improvement of proptosis  and ptosis OD since her last visit. Recent labs TSH 3.18 and T4 0.98L    Exam 10/17/23: OD slightly more proptotic than OS although within range of normal. Normal resistance to retropulsion. No chemosis or injection. Afferent and efferent function normal. LEA 1-2    Exam 4/17/24: Improved hertels with 17mm OU. No periorbital edema, chemosis or injection on todays exam. Minimal inferior scleral show, no lagophthalmos. EOM full, alignment ortho. Optic nerves pink and healthy. Afferent visual exam normal. RNFL 91 OD and 92 OS. LEA 0     Plan:   -RTC in 6 months time given inferior scleral show  -Discussed adherence to regular use of artifical tears. Intermittent blurred vision while on phone or computer likely secondary to dry eye and decreased blink   -Discussed magnesium for migraine prevention. Discussed connections between dry eye, light sensitivity and migraine   -Continue thyroid management with Dr Danielson    Graves disease  Diagnosed 2023 with Graves disease  Follows with Dr Danielson  TSH 3.18   T4 0.98L  Meds: Methimazole 10mg daily, propanolol 10mg TID    New onset headache  Reports new onset headache after recent hospital admission 3/2024 fo renal stones. PCP working up for LANDY as cause    Onset: 3/2023 following admission for kidney stones   Characteristic: bitemporal pressure  Severity: 9/10  Associated: light sensitivity, sound sensitivity, rarely nausea  Duration: an hour  Frequency: daily    Plan:   Headache characteristic consistent with migraine without aura given severity and associated symptoms. Discussed trial of Magox 400mg daily to see if headaches improve. Continue work up and management with PCP        Landy Christiansen M.D.

## 2024-04-17 NOTE — ASSESSMENT & PLAN NOTE
Diagnosed 2023 with Graves disease  Follows with Dr Danielson  TSH 3.18   T4 0.98L  Meds: Methimazole 10mg daily, propanolol 10mg TID

## 2024-04-22 ENCOUNTER — OCCUPATIONAL THERAPY (OUTPATIENT)
Dept: OCCUPATIONAL THERAPY | Facility: REHABILITATION | Age: 34
End: 2024-04-22
Attending: FAMILY MEDICINE
Payer: COMMERCIAL

## 2024-04-22 DIAGNOSIS — G56.22 NEURITIS OF LEFT ULNAR NERVE: ICD-10-CM

## 2024-04-22 PROCEDURE — 97110 THERAPEUTIC EXERCISES: CPT

## 2024-04-22 PROCEDURE — 97010 HOT OR COLD PACKS THERAPY: CPT

## 2024-04-22 NOTE — OP THERAPY DAILY TREATMENT
Outpatient Occupational Therapy  DAILY TREATMENT     [facility] Occupational Therapy [address].  [address]  [place] [address]  Phone:  [phone]  Fax:  [phone]    Date: 04/22/2024    Patient: Deisy Gilbert  YOB: 1990  MRN: [id number]     Time Calculation  Start time: 1050  Stop time: 1135 Time Calculation (min): 45 minutes         Chief Complaint: Extremity Weakness and Elbow Problem    Visit #: 3    SUBJECTIVE:  I still have a little bit of pain at my wrist, but does feel a lot better.      OBJECTIVE:  Current objective measures:   Neurological Testing   Sensation   Wrist/Hand      Right   Intact: light touch, sharp/dull discrimination and hot/cold discrimination     Palpation   Right   No palpable tenderness to the extensor carpi radialis brevis.      Tenderness      Right Wrist/Hand   Tenderness in the first dorsal compartment. No tenderness in the common extensor tendon, intersection dorsal forearm area and lateral epicondyle.      Right Elbow   No tenderness in the common extensor tendon and lateral epicondyle.      Active Range of Motion   Intact     Strength      Left Wrist/Hand       (2nd hand position)     Trial 1: 54     Right Wrist/Hand       (2nd hand position)     Trial 1: 60     Tests      Right Wrist/Hand   Negative Finkelstein's.   Negative scapholunate shear, TFCC load and Tinel's sign (median nerve).      Right Elbow   Negative Tinel's sign (cubital tunnel).      General Comments      Wrist/Hand Comments  9 hole peg test:  R = 20 seconds  L = 20 seconds           Therapeutic Exercises (CPT 09537):     1. isolated strengthening of wrist RD and extension, 3# 3 x 10    2. isolated digit extesion strengthening    3. medium resistance theraputty for hand strengthening    4. Soft tissue stretches of wrist    5. flex bar - green - firm, 10 x in horizontal and vertical positon.    6. 9# digi-flex, 10 x    7. 11# graded clip, 10 x    Therapeutic Exercise  Continue: PreserVision AREDS 2 (vit c,e-ec-gbsqy-lutein-zeaxan): capsule: [id number] mg-unit-mg-mg 1 capsule once a day by mouth Summary:  Has purchased a neoprene thumb/ wrist support at the gym and to wear a more rigid support at night to minimize painful symptoms in the morning       Therapeutic Treatments and Modalities:    1. Hot or Cold Pack Therapy (CPT 75134)    Therapeutic Treatments and Modalities Summary: Heat treatment completed after there ex as part of pain management     Time-based treatments/modalities:  Therapeutic exercise minutes (CPT 36678): 30 minutes        Pain rating before treatment: 3  Pain rating after treatment: 1  1st dorsal compartment   ASSESSMENT:   Response to treatment: Patient progressing well and progress note completed.  Patient and OTR discussed leaving case open for 30 days and if she feels she needs a follow up review, will contact us and make an appointment.  If she does not contact us, then it will be assumed everything is going well and will discharge from caseload.      PLAN/RECOMMENDATIONS:   Plan for treatment: therapy treatment to continue next visit.  Planned interventions for next visit: continue with current treatment

## 2024-04-22 NOTE — OP THERAPY PROGRESS SUMMARY
Outpatient Occupational Therapy  PROGRESS SUMMARY NOTE    [facility] Occupational Therapy [address].  [address]  [place] [address]  Phone:  [phone]  Fax:  [phone]    Date of Visit: 04/22/2024    Patient: Deisy Gilbert  YOB: 1990  MRN: [id number]     Referring Provider: Isidoro Robin M.D.  [address] [place] [address]   Referring Diagnosis Lesion of ulnar nerve, left upper limb [G56.22]     Visit Diagnoses     ICD-10-CM   1. Neuritis of left ulnar nerve  G56.22       Rehab Potential: excellent    Progress Report Period: 3/25/24-4/22/24    Functional Assessment Used  UEFI = 68/80           Objective Findings and Assessment:   Patient progression towards goals: Patient seen for initial evaluation and 2 follow up visits and is progressing well.  Continues to have some pain /tenderness at first dorsal compartment, but everything else has improved.  It was decided with patient to leave case open for 30 days and if she feels she needs a follow up appointment/review to call and schedule an appointment.  If patient has not been in touch within the 30 days, it will be assumed that patient is doing well and case will be closed.      Objective findings and assessment details: Neurological Testing   Sensation   Wrist/Hand      Right   Intact: light touch, sharp/dull discrimination and hot/cold discrimination     Palpation   Right   No palpable tenderness to the extensor carpi radialis brevis.      Tenderness      Right Wrist/Hand   Tenderness in the first dorsal compartment. No tenderness in the common extensor tendon, intersection dorsal forearm area and lateral epicondyle.      Right Elbow   No tenderness in the common extensor tendon and lateral epicondyle.      Active Range of Motion   Intact     Strength      Left Wrist/Hand       (2nd hand position)     Trial 1: 54     Right Wrist/Hand       (2nd hand position)     Trial 1: 60     Tests      Right  Wrist/Hand   Negative Finkelstein's.   Negative scapholunate shear, TFCC load and Tinel's sign (median nerve).      Right Elbow   Negative Tinel's sign (cubital tunnel).      General Comments      Wrist/Hand Comments  9 hole peg test:  R = 20 seconds  L = 20 seconds      Goals:   Short Term Goals:    decrease hypersensitivity, increase ADL independence, increase soft tissue/skin mobility, increase strength and independent with HEP performance  Short term goal timespan:  2-4 weeks    Short term goal timespan:  1-2 weeks    Long Term Goals:   Patient will using compensatory strategies and use of supports during activities that may aggravate symptoms  Patient will score at least 70/80 on the UEFI  Patient will have 2/10 wrist pain during functional tasks and work related tasks.      Long term goal timespan:  4-6 weeks    Plan:   Planned therapy interventions:  Hot or Cold Pack Therapy (CPT 54651), Self Care ADL Training (CPT 63691), Therapeutic Activities (CPT 35606) and Therapeutic Exercise (CPT 49477)  Frequency:  1x month  Duration in weeks:  1  Duration in visits:  1      Referring provider co-signature:  I have reviewed this plan of care and my co-signature certifies the need for services.    Certification Period: 04/22/2024 to 05/27/24    Physician Signature: ________________________________ Date: ______________

## 2024-04-26 ENCOUNTER — HOSPITAL ENCOUNTER (OUTPATIENT)
Dept: LAB | Facility: MEDICAL CENTER | Age: 34
End: 2024-04-26
Attending: STUDENT IN AN ORGANIZED HEALTH CARE EDUCATION/TRAINING PROGRAM
Payer: COMMERCIAL

## 2024-04-26 DIAGNOSIS — E05.90 HYPERTHYROIDISM: ICD-10-CM

## 2024-04-26 DIAGNOSIS — E03.2 IATROGENIC HYPOTHYROIDISM: ICD-10-CM

## 2024-04-26 LAB
T4 FREE SERPL-MCNC: 0.94 NG/DL (ref 0.93–1.7)
TSH SERPL DL<=0.005 MIU/L-ACNC: 3.25 UIU/ML (ref 0.38–5.33)

## 2024-04-26 PROCEDURE — 84443 ASSAY THYROID STIM HORMONE: CPT

## 2024-04-26 PROCEDURE — 84439 ASSAY OF FREE THYROXINE: CPT

## 2024-04-26 PROCEDURE — 36415 COLL VENOUS BLD VENIPUNCTURE: CPT

## 2024-04-30 ENCOUNTER — OFFICE VISIT (OUTPATIENT)
Dept: OPHTHALMOLOGY | Facility: MEDICAL CENTER | Age: 34
End: 2024-04-30
Payer: COMMERCIAL

## 2024-04-30 DIAGNOSIS — E07.9 THYROID ORBITOPATHY: ICD-10-CM

## 2024-04-30 DIAGNOSIS — E05.00 GRAVES DISEASE: ICD-10-CM

## 2024-04-30 DIAGNOSIS — H00.025 HORDEOLUM INTERNUM OF LEFT LOWER EYELID: ICD-10-CM

## 2024-04-30 DIAGNOSIS — H05.89 THYROID ORBITOPATHY: ICD-10-CM

## 2024-04-30 ASSESSMENT — SLIT LAMP EXAM - LIDS: COMMENTS: NORMAL

## 2024-04-30 ASSESSMENT — ENCOUNTER SYMPTOMS
BLURRED VISION: 1
HEADACHES: 1
EYE PAIN: 1

## 2024-04-30 ASSESSMENT — VISUAL ACUITY
CORRECTION_TYPE: GLASSES
OD_CC: 20/20
METHOD: SNELLEN - LINEAR
OS_CC: 20/20

## 2024-04-30 ASSESSMENT — REFRACTION_MANIFEST
OS_AXIS: 078
OS_CYLINDER: +0.75
OD_CYLINDER: +1.00
OD_SPHERE: -1.00
OS_SPHERE: -0.50
METHOD_AUTOREFRACTION: 1
OD_AXIS: 099

## 2024-04-30 ASSESSMENT — CONF VISUAL FIELD
OD_INFERIOR_NASAL_RESTRICTION: 0
OS_NORMAL: 1
OS_INFERIOR_NASAL_RESTRICTION: 0
OD_SUPERIOR_TEMPORAL_RESTRICTION: 0
OS_SUPERIOR_TEMPORAL_RESTRICTION: 0
OS_SUPERIOR_NASAL_RESTRICTION: 0
OD_NORMAL: 1
OD_SUPERIOR_NASAL_RESTRICTION: 0
OS_INFERIOR_TEMPORAL_RESTRICTION: 0
OD_INFERIOR_TEMPORAL_RESTRICTION: 0

## 2024-04-30 ASSESSMENT — REFRACTION_WEARINGRX
OS_AXIS: 163
OD_CYLINDER: -1.00
OD_SPHERE: -1.00
OD_AXIS: 018
OS_CYLINDER: -0.50
SPECS_TYPE: SVL
OS_SPHERE: -1.00

## 2024-04-30 ASSESSMENT — EXTERNAL EXAM - RIGHT EYE: OD_EXAM: NORMAL

## 2024-04-30 ASSESSMENT — TONOMETRY
OD_IOP_MMHG: 15
IOP_METHOD: I-CARE
OS_IOP_MMHG: 17

## 2024-04-30 ASSESSMENT — CUP TO DISC RATIO
OS_RATIO: 0.3
OD_RATIO: 0.3

## 2024-04-30 ASSESSMENT — EXTERNAL EXAM - LEFT EYE: OS_EXAM: NORMAL

## 2024-04-30 NOTE — ASSESSMENT & PLAN NOTE
Reports onset of L periorbital pain with radiation down check 4/25/27. Pain and swelling have since improved with warm compresses. Exam demonstrates a small hordeolum in the internal L lower lid. No evidence of periorbital cellulitis. Recommended regular use of artificial tears, regular use of warm compresses. If symptoms persistent or worsen, patient instructed to follow up with regular optometrist for consideration of antibiotics. Educated to avoid makeup and practice good hygiene while stye resolves

## 2024-04-30 NOTE — ASSESSMENT & PLAN NOTE
Graves disease stable on Methimazole 10mg following with Dr Danielson    Currently denies any double vision, blurred vision, loss of vision. Reports improvement of proptosis and ptosis OD since her last visit. Recent labs TSH 3.18 and T4 0.98L    Exam 10/17/23: OD slightly more proptotic than OS although within range of normal. Normal resistance to retropulsion. No chemosis or injection. Afferent and efferent function normal. LEA 1-2    Exam 4/17/24: Improved hertels with 17mm OU. No periorbital edema, chemosis or injection on todays exam. Minimal inferior scleral show, no lagophthalmos. EOM full, alignment ortho. Optic nerves pink and healthy. Afferent visual exam normal. RNFL 91 OD and 92 OS. LEA 0     Exam 4/30/24: Stable exam. Optic nerves pink and healthy. RNFL 92 OD and 87 OS. + hordeolum inferior interior lid. No evidence of periorbital cellulitis    Plan:   -RTC in 9-12 months, sooner if symptoms develop  -Discussed adherence to regular use of artifical tears. Intermittent blurred vision while on phone or computer likely secondary to dry eye and decreased blink   -Discussed magnesium for migraine prevention. Discussed connections between dry eye, light sensitivity and migraine   -Continue thyroid management with Dr Danielson

## 2024-04-30 NOTE — PROGRESS NOTES
Peds/Neuro Ophthalmology:   Landy Christiansen M.D.    Date & Time note created:    4/30/2024   10:33 AM     Referring MD / APRN:  Nanci Lucero M.D., No att. providers found    Patient ID:  Name:             Deisy Gilbert     YOB: 1990  Age:                 33 y.o.  female   MRN:               [id number]    Chief Complaint/Reason for Visit:     Eye Pain (Left Lower lid was tender x 5 days.)      History of Present Illness:      Ms Gilbert presents for follow up for thyroid orbitopathy. She was last seen 4/17/24 and presents for an urgent evaluation due to eye pain    Patient reports a sharp pain in the left eye that started 4/25. The pain is in the L periorbital region and radiates down her cheek. Over the weekend she noticed the lower lid swelled up.  The pain and swelling has since improved, with current severity at 1/10. The pain was not associated with any vision loss. She otherwise denies any worsening of her thyroid symptoms. She denies any proptosis, swelling of her eyes. She reports her recent thyroid labs had normalized    At her last visit patient had a LEA score 1-2 with no optic nerve involvement. She was recommended to return for reevaluation in 6 months. Since her last visit patient has noticed improvement in the proptosis and swelling of her eyes. She reports her thyroid has been well controlled on methimazole and TSH has since normalized. T4 was mildly low at 0.98 therefore methimazole was recently decreased from 15mg to 10mg.She does notice that when staring at her computer or phone too long her vision becomes blurred and strained. She is able to blink away the blurred vision. She develops a headache after staring at the screen too long. She is being evaluated for sleep apnea because of these headaches. She continues to struggle with watery, red and itchy eyes, but admits to difficulty adhering to Refresh AT. She denies any allergies.  She denies any double vision. She denies any  spontaneous eye pain or pain with eye movements.     Recent thyroid labs 4/26/24  FT4 0.94  TSH 3.250     Endocrinology: Kamilah Danielson  Methimazole 10mg , weaning of propanolol   No thymectomy    Headache Characteristics  Onset: 3/2023 following admission for kidney stones   Characteristic: bitemporal pressure  Severity: 9/10  Associated: light sensitivity, sound sensitivity, rarely nausea  Duration: an hour  Frequency: daily        Review of Systems:  Review of Systems   Eyes:  Positive for blurred vision and pain.        ALEJANDRO OU  Watery and itchy eyes OU   Neurological:  Positive for headaches.   All other systems reviewed and are negative.      Past Medical History:   Past Medical History:   Diagnosis Date    Graves disease 08/18/2023    History of kidney stones        Past Surgical History:  Past Surgical History:   Procedure Laterality Date    STENT PLACEMENT  10/2023    CYSTOSCOPY STENT PLACEMENT  2017       Current Outpatient Medications:  Current Outpatient Medications   Medication Sig Dispense Refill    Vitamin D, Cholecalciferol, (CHOLECALCIFEROL) 25 MCG (1000 UT) Tab Take 1,000 Units by mouth every day.      methimazole (TAPAZOLE) 10 MG Tab Take 1.5 Tablets by mouth every day. 140 Tablet 4    ciclopirox (PENLAC) 8 % solution Apply evenly over entire nail plate nightly to all affected nails. 6 mL 3    norgestrel-ethinyl estradiol (LOW-OGESTREL) 0.3-30 MG-MCG Tab Take 1 Tablet by mouth every day. 84 Tablet 4    Potassium Citrate 15 MEQ (1620 MG) Tab CR       propranolol (INDERAL) 10 MG Tab TAKE 1 TABLET BY MOUTH 3 TIMES A  Tablet 1     No current facility-administered medications for this visit.       Allergies:  Allergies   Allergen Reactions    Lactose      Other reaction(s): GI Intolerance       Family History:  Family History   Problem Relation Age of Onset    Hypertension Mother     Eczema Father     No Known Problems Brother     Breast Cancer Paternal Aunt     Diabetes Paternal Uncle      Hypertension Maternal Grandmother        Social History:  Social History     Socioeconomic History    Marital status: Single     Spouse name: Not on file    Number of children: Not on file    Years of education: Not on file    Highest education level: Bachelor's degree (e.g., BA, AB, BS)   Occupational History    Not on file   Tobacco Use    Smoking status: Former     Types: Cigarettes    Smokeless tobacco: Never   Vaping Use    Vaping Use: Never used   Substance and Sexual Activity    Alcohol use: Not Currently    Drug use: Not Currently    Sexual activity: Not on file   Other Topics Concern    Not on file   Social History Narrative    Senior tech artist      Social Determinants of Health     Financial Resource Strain: Low Risk  (8/21/2023)    Overall Financial Resource Strain (CARDIA)     Difficulty of Paying Living Expenses: Not very hard   Food Insecurity: No Food Insecurity (8/21/2023)    Hunger Vital Sign     Worried About Running Out of Food in the Last Year: Never true     Ran Out of Food in the Last Year: Never true   Transportation Needs: No Transportation Needs (8/21/2023)    PRAPARE - Transportation     Lack of Transportation (Medical): No     Lack of Transportation (Non-Medical): No   Physical Activity: Insufficiently Active (8/21/2023)    Exercise Vital Sign     Days of Exercise per Week: 3 days     Minutes of Exercise per Session: 30 min   Stress: Stress Concern Present (8/21/2023)    [place] [employer] - Occupational Stress Questionnaire     Feeling of Stress : To some extent   Social Connections: Socially Isolated (8/21/2023)    Social Connection and Isolation Panel [NHANES]     Frequency of Communication with Friends and Family: More than three times a week     Frequency of Social Gatherings with Friends and Family: Patient declined     Attends Sabianist Services: Never     Active Member of Clubs or Organizations: No     Attends Club or Organization Meetings: Never     Marital  Status: Never    Intimate Partner Violence: Not on file   Housing Stability: Low Risk  (8/21/2023)    Housing Stability Vital Sign     Unable to Pay for Housing in the Last Year: No     Number of Places Lived in the Last Year: 1     Unstable Housing in the Last Year: No          Physical Exam:  Physical Exam    Oriented x 3  Weight/BMI: There is no height or weight on file to calculate BMI.  There were no vitals taken for this visit.    Base Eye Exam       Visual Acuity (Snellen - Linear)         Right Left    Dist cc 20/20 20/20      Correction: Glasses              Tonometry (i-care, 9:57 AM)         Right Left    Pressure 15 17              Pupils         Pupils Dark Light Shape React APD    Right PERRL 4 3 Round Brisk None    Left PERRL 4 3 Round Brisk None              Visual Fields         Right Left     Full Full              Extraocular Movement         Right Left     Full, Ortho Full, Ortho              Neuro/Psych       Oriented x3: Yes    Mood/Affect: Normal                  Slit Lamp and Fundus Exam       External Exam         Right Left    External Normal Normal              Slit Lamp Exam         Right Left    Lids/Lashes Normal Hordeolum, Hordeolum - lower lid    Conjunctiva/Sclera White and quiet White and quiet    Cornea Clear Clear    Anterior Chamber Deep and quiet Deep and quiet    Iris Round and reactive Round and reactive    Lens Clear Clear              Fundus Exam         Right Left    Disc pink, sharp disc margins, flat pink, sharp disc margins, flat    C/D Ratio 0.3 0.3    Macula Normal Normal                  Refraction       Wearing Rx         Sphere Cylinder Axis    Right -1.00 -1.00 018    Left -1.00 -0.50 163      Type: SVL              Manifest Refraction (Auto)         Sphere Cylinder Axis    Right -1.00 +1.00 099    Left -0.50 +0.75 078                    Pertinent Lab/Test/Imaging Review:  Labs 4/26/24  FT4 0.94  TSH 3.250    Assessment and Plan:     Thyroid  orbitopathy  Graves disease stable on Methimazole 10mg following with Dr Danielson    Currently denies any double vision, blurred vision, loss of vision. Reports improvement of proptosis and ptosis OD since her last visit. Recent labs TSH 3.18 and T4 0.98L    Exam 10/17/23: OD slightly more proptotic than OS although within range of normal. Normal resistance to retropulsion. No chemosis or injection. Afferent and efferent function normal. LEA 1-2    Exam 4/17/24: Improved hertels with 17mm OU. No periorbital edema, chemosis or injection on todays exam. Minimal inferior scleral show, no lagophthalmos. EOM full, alignment ortho. Optic nerves pink and healthy. Afferent visual exam normal. RNFL 91 OD and 92 OS. LEA 0     Exam 4/30/24: Stable exam. Optic nerves pink and healthy. RNFL 92 OD and 87 OS. + hordeolum inferior interior lid. No evidence of periorbital cellulitis    Plan:   -RTC in 9-12 months, sooner if symptoms develop  -Discussed adherence to regular use of artifical tears. Intermittent blurred vision while on phone or computer likely secondary to dry eye and decreased blink   -Discussed magnesium for migraine prevention. Discussed connections between dry eye, light sensitivity and migraine   -Continue thyroid management with Dr Danielson    Hordeolum internum of left lower eyelid  Reports onset of L periorbital pain with radiation down check 4/25/27. Pain and swelling have since improved with warm compresses. Exam demonstrates a small hordeolum in the internal L lower lid. No evidence of periorbital cellulitis. Recommended regular use of artificial tears, regular use of warm compresses. If symptoms persistent or worsen, patient instructed to follow up with regular optometrist for consideration of antibiotics. Educated to avoid makeup and practice good hygiene while stye resolves    Graves disease  Diagnosed 2023 with Graves disease  Follows with Dr Danielson  TSH 3.18   T4 0.98L  Meds: Methimazole 10mg daily,  propanolol 10mg TID          Landy Christiansen M.D.

## 2024-05-07 DIAGNOSIS — B35.1 ONYCHOMYCOSIS: ICD-10-CM

## 2024-05-08 RX ORDER — CICLOPIROX 80 MG/ML
SOLUTION TOPICAL
Qty: 6 ML | Refills: 3 | Status: SHIPPED | OUTPATIENT
Start: 2024-05-08

## 2024-05-28 ENCOUNTER — OFFICE VISIT (OUTPATIENT)
Dept: MEDICAL GROUP | Facility: MEDICAL CENTER | Age: 34
End: 2024-05-28
Payer: COMMERCIAL

## 2024-05-28 VITALS
BODY MASS INDEX: 33.4 KG/M2 | TEMPERATURE: 98.2 F | WEIGHT: 176.92 LBS | OXYGEN SATURATION: 98 % | HEART RATE: 96 BPM | DIASTOLIC BLOOD PRESSURE: 80 MMHG | SYSTOLIC BLOOD PRESSURE: 124 MMHG | HEIGHT: 61 IN

## 2024-05-28 DIAGNOSIS — R74.8 ELEVATED ALKALINE PHOSPHATASE LEVEL: ICD-10-CM

## 2024-05-28 DIAGNOSIS — M54.9 MUSCULOSKELETAL BACK PAIN: Primary | ICD-10-CM

## 2024-05-28 PROCEDURE — 3074F SYST BP LT 130 MM HG: CPT | Performed by: STUDENT IN AN ORGANIZED HEALTH CARE EDUCATION/TRAINING PROGRAM

## 2024-05-28 PROCEDURE — 99214 OFFICE O/P EST MOD 30 MIN: CPT | Performed by: STUDENT IN AN ORGANIZED HEALTH CARE EDUCATION/TRAINING PROGRAM

## 2024-05-28 PROCEDURE — 3079F DIAST BP 80-89 MM HG: CPT | Performed by: STUDENT IN AN ORGANIZED HEALTH CARE EDUCATION/TRAINING PROGRAM

## 2024-05-28 RX ORDER — MAGNESIUM 200 MG
200 TABLET ORAL DAILY
COMMUNITY

## 2024-05-28 RX ORDER — CYCLOBENZAPRINE HCL 5 MG
5 TABLET ORAL 3 TIMES DAILY PRN
Qty: 30 TABLET | Refills: 0 | Status: SHIPPED | OUTPATIENT
Start: 2024-05-28

## 2024-05-28 ASSESSMENT — FIBROSIS 4 INDEX: FIB4 SCORE: 0.55

## 2024-05-28 NOTE — PROGRESS NOTES
"Subjective:     CC:   Chief Complaint   Patient presents with    Back Pain     Back pain started on 5/22/24 by sheila alba, over extended herself    Referral Needed     Physical therapy          HPI:   Deisy presents today with      Back pain:  Acute onset   Started Last week .While working in Gym with weights/squats - felt stiffness/spasm - right side of  back . Pain more in right paraspinal and mid-low back   Warm compression  Ibuprofen tried as needed   Rested but restarted exercise within 3 days which worsened the pain again    Labs reviewed   Bone fraction of Alk phos high - due to thyroid problem and low vit D  Recommended start vit D 2000 IU         Health Maintenance: Completed    ROS:  ROS    Review of systems unremarkable except for concerns noted by patient or items listed.    Please see HPI for additional ROS.      Objective:     Exam:  /80 (BP Location: Left arm, Patient Position: Sitting, BP Cuff Size: Adult)   Pulse 96   Temp 36.8 °C (98.2 °F) (Temporal)   Ht 1.549 m (5' 1\")   Wt 80.3 kg (176 lb 14.7 oz)   LMP 05/12/2024   SpO2 98%   BMI 33.43 kg/m²  Body mass index is 33.43 kg/m².    Physical Exam  Constitutional:       Appearance: Normal appearance.   HENT:      Head: Normocephalic.   Eyes:      General: No scleral icterus.  Cardiovascular:      Rate and Rhythm: Normal rate and regular rhythm.      Pulses: Normal pulses.      Heart sounds: Normal heart sounds.   Pulmonary:      Effort: Pulmonary effort is normal.      Breath sounds: Normal breath sounds.   Musculoskeletal:      Right lower leg: No edema.      Left lower leg: No edema.   Skin:     General: Skin is warm.   Neurological:      Mental Status: She is alert and oriented to person, place, and time.   Psychiatric:         Mood and Affect: Mood normal.         Behavior: Behavior normal.             Labs: reviewed     Assessment & Plan:     33 y.o. female with the following -       1. Musculoskeletal back pain  Acute " problem, uncontrolled  Most likely musculoskeletal pain/spasm due to weightlifting  Plan  Recommended RICE  Recommended to avoid heavy weight lifting or excessive exercising for the next 2 weeks.  Ice for compression for the first week and warm compressions after that as needed  Topical Voltaren gel 4 times a day as needed  Muscle relaxer with Flexeril 5 mg 3 times daily as needed  As needed ibuprofen 600 mg twice daily  If no improvement in symptoms in next 3 to 4 weeks then we will consider physical therapy.  - diclofenac sodium (VOLTAREN) 1 % Gel; Apply 2 g topically 4 times a day as needed (back pain).  Dispense: 50 g; Refill: 0  - cyclobenzaprine (FLEXERIL) 5 mg tablet; Take 1 Tablet by mouth 3 times a day as needed for Muscle Spasms.  Dispense: 30 Tablet; Refill: 0  - Referral to Physical Therapy    2. Elevated alkaline phosphatase level    Chronic, stable  Discussed with patient that alkaline phosphatase is slightly elevated which is a bone fraction.  Liver fraction alkaline phosphatase is within normal range  Most likely due to hypothyroidism as well as low vitamin D levels  Recommended to continue treatment for hyperthyroidism with methimazole and vitamin D supplements 2000 IU daily        I spent a total of 37 minutes with record review, exam, communication with the patient, communication with other providers, and documentation of this encounter  I spent at least 50% of the time for counseling and education.     Return in about 6 months (around 11/28/2024).    Please note that this dictation was created using voice recognition software. I have made every reasonable attempt to correct obvious errors, but I expect that there are errors of grammar and possibly content that I did not discover before finalizing the note.

## 2024-05-29 ENCOUNTER — TELEPHONE (OUTPATIENT)
Dept: OCCUPATIONAL THERAPY | Facility: REHABILITATION | Age: 34
End: 2024-05-29
Payer: COMMERCIAL

## 2024-05-29 NOTE — OP THERAPY DISCHARGE SUMMARY
Outpatient Occupational Therapy  DISCHARGE SUMMARY NOTE    [facility] Occupational Therapy [place] [address]  [address]  [place] [address]  Phone:  [phone]  Fax:  [phone]    Date of Visit: 05/29/2024    Patient: Deisy Gilbert  YOB: 1990  MRN: [id number]     Referring Provider: Isidoro Robin M.D.  [facility] [place] [address]   Referring Diagnosis: Neuritis of left ulnar nerve [G56.22]            Your patient is being discharged from Occupational Therapy with the following comments:   Goals met    Comments:  Patient seen for initial evaluation on 3/25/24 and 2 follow up visits for neuritis of left ulnar nerve.  Did very well with HEP and therapy and last appointment was on 4/22/25.  Patient's case left open for 30 days if patient felt she needed follow up.  She has not scheduled an appointment and assuming all is well, she has been discharged from OT service.      YARELY Argueta/L    Date: 5/29/2024

## 2024-05-31 ENCOUNTER — APPOINTMENT (OUTPATIENT)
Dept: SLEEP MEDICINE | Facility: MEDICAL CENTER | Age: 34
End: 2024-05-31
Attending: STUDENT IN AN ORGANIZED HEALTH CARE EDUCATION/TRAINING PROGRAM
Payer: COMMERCIAL

## 2024-05-31 DIAGNOSIS — Z91.89 AT RISK FOR SLEEP APNEA: ICD-10-CM

## 2024-05-31 PROCEDURE — 95800 SLP STDY UNATTENDED: CPT | Performed by: STUDENT IN AN ORGANIZED HEALTH CARE EDUCATION/TRAINING PROGRAM

## 2024-06-04 ENCOUNTER — HOSPITAL ENCOUNTER (EMERGENCY)
Facility: MEDICAL CENTER | Age: 34
End: 2024-06-04
Attending: STUDENT IN AN ORGANIZED HEALTH CARE EDUCATION/TRAINING PROGRAM
Payer: COMMERCIAL

## 2024-06-04 ENCOUNTER — APPOINTMENT (OUTPATIENT)
Dept: RADIOLOGY | Facility: MEDICAL CENTER | Age: 34
End: 2024-06-04
Attending: STUDENT IN AN ORGANIZED HEALTH CARE EDUCATION/TRAINING PROGRAM
Payer: COMMERCIAL

## 2024-06-04 VITALS
HEART RATE: 79 BPM | WEIGHT: 177.25 LBS | HEIGHT: 61 IN | TEMPERATURE: 97.8 F | SYSTOLIC BLOOD PRESSURE: 147 MMHG | OXYGEN SATURATION: 96 % | DIASTOLIC BLOOD PRESSURE: 87 MMHG | RESPIRATION RATE: 16 BRPM | BODY MASS INDEX: 33.47 KG/M2

## 2024-06-04 DIAGNOSIS — R00.2 PALPITATIONS: ICD-10-CM

## 2024-06-04 DIAGNOSIS — R03.0 ELEVATED BLOOD PRESSURE READING: ICD-10-CM

## 2024-06-04 LAB
ALBUMIN SERPL BCP-MCNC: 4.3 G/DL (ref 3.2–4.9)
ALBUMIN/GLOB SERPL: 1.3 G/DL
ALP SERPL-CCNC: 135 U/L (ref 30–99)
ALT SERPL-CCNC: 37 U/L (ref 2–50)
ANION GAP SERPL CALC-SCNC: 12 MMOL/L (ref 7–16)
AST SERPL-CCNC: 54 U/L (ref 12–45)
BASOPHILS # BLD AUTO: 0.8 % (ref 0–1.8)
BASOPHILS # BLD: 0.07 K/UL (ref 0–0.12)
BILIRUB SERPL-MCNC: 0.2 MG/DL (ref 0.1–1.5)
BUN SERPL-MCNC: 16 MG/DL (ref 8–22)
CALCIUM ALBUM COR SERPL-MCNC: 8.9 MG/DL (ref 8.5–10.5)
CALCIUM SERPL-MCNC: 9.1 MG/DL (ref 8.5–10.5)
CHLORIDE SERPL-SCNC: 105 MMOL/L (ref 96–112)
CO2 SERPL-SCNC: 22 MMOL/L (ref 20–33)
CREAT SERPL-MCNC: 0.56 MG/DL (ref 0.5–1.4)
EKG IMPRESSION: NORMAL
EOSINOPHIL # BLD AUTO: 0.37 K/UL (ref 0–0.51)
EOSINOPHIL NFR BLD: 4.2 % (ref 0–6.9)
ERYTHROCYTE [DISTWIDTH] IN BLOOD BY AUTOMATED COUNT: 41.8 FL (ref 35.9–50)
GFR SERPLBLD CREATININE-BSD FMLA CKD-EPI: 123 ML/MIN/1.73 M 2
GLOBULIN SER CALC-MCNC: 3.4 G/DL (ref 1.9–3.5)
GLUCOSE SERPL-MCNC: 93 MG/DL (ref 65–99)
HCT VFR BLD AUTO: 45.6 % (ref 37–47)
HGB BLD-MCNC: 15.7 G/DL (ref 12–16)
IMM GRANULOCYTES # BLD AUTO: 0.02 K/UL (ref 0–0.11)
IMM GRANULOCYTES NFR BLD AUTO: 0.2 % (ref 0–0.9)
LYMPHOCYTES # BLD AUTO: 3.08 K/UL (ref 1–4.8)
LYMPHOCYTES NFR BLD: 34.9 % (ref 22–41)
MAGNESIUM SERPL-MCNC: 2.4 MG/DL (ref 1.5–2.5)
MCH RBC QN AUTO: 29.5 PG (ref 27–33)
MCHC RBC AUTO-ENTMCNC: 34.4 G/DL (ref 32.2–35.5)
MCV RBC AUTO: 85.7 FL (ref 81.4–97.8)
MONOCYTES # BLD AUTO: 0.59 K/UL (ref 0–0.85)
MONOCYTES NFR BLD AUTO: 6.7 % (ref 0–13.4)
NEUTROPHILS # BLD AUTO: 4.7 K/UL (ref 1.82–7.42)
NEUTROPHILS NFR BLD: 53.2 % (ref 44–72)
NRBC # BLD AUTO: 0 K/UL
NRBC BLD-RTO: 0 /100 WBC (ref 0–0.2)
PLATELET # BLD AUTO: 304 K/UL (ref 164–446)
PMV BLD AUTO: 9.8 FL (ref 9–12.9)
POTASSIUM SERPL-SCNC: 3.9 MMOL/L (ref 3.6–5.5)
PROT SERPL-MCNC: 7.7 G/DL (ref 6–8.2)
RBC # BLD AUTO: 5.32 M/UL (ref 4.2–5.4)
SODIUM SERPL-SCNC: 139 MMOL/L (ref 135–145)
TROPONIN T SERPL-MCNC: <6 NG/L (ref 6–19)
TSH SERPL DL<=0.005 MIU/L-ACNC: 3.76 UIU/ML (ref 0.38–5.33)
WBC # BLD AUTO: 8.8 K/UL (ref 4.8–10.8)

## 2024-06-04 PROCEDURE — 83735 ASSAY OF MAGNESIUM: CPT

## 2024-06-04 PROCEDURE — 85025 COMPLETE CBC W/AUTO DIFF WBC: CPT

## 2024-06-04 PROCEDURE — 80053 COMPREHEN METABOLIC PANEL: CPT

## 2024-06-04 PROCEDURE — 84484 ASSAY OF TROPONIN QUANT: CPT

## 2024-06-04 PROCEDURE — 36415 COLL VENOUS BLD VENIPUNCTURE: CPT

## 2024-06-04 PROCEDURE — 93005 ELECTROCARDIOGRAM TRACING: CPT | Performed by: STUDENT IN AN ORGANIZED HEALTH CARE EDUCATION/TRAINING PROGRAM

## 2024-06-04 PROCEDURE — 99283 EMERGENCY DEPT VISIT LOW MDM: CPT

## 2024-06-04 PROCEDURE — 71045 X-RAY EXAM CHEST 1 VIEW: CPT

## 2024-06-04 PROCEDURE — 93005 ELECTROCARDIOGRAM TRACING: CPT

## 2024-06-04 PROCEDURE — 84443 ASSAY THYROID STIM HORMONE: CPT

## 2024-06-04 ASSESSMENT — FIBROSIS 4 INDEX: FIB4 SCORE: 0.55

## 2024-06-05 NOTE — ED PROVIDER NOTES
ED Provider Note    CHIEF COMPLAINT  Chief Complaint   Patient presents with    Palpitations     Started today, HR up to 112 while watching TV and SOB with cough also started.  +lightheadedness, 8/10 chest pressure.   Dx with graves disease last year       EXTERNAL RECORDS REVIEWED  Outpatient Notes outpatient endocrinology visit for hyperthyroidism    HPI/ROS  LIMITATION TO HISTORY   Select: : None  OUTSIDE HISTORIAN(S):  none    Deisy Gilbert is a 33 y.o. female who presents episode of palpitations occurring while she was sitting on the couch.  Patient notes palpitations prompted her to feel like she had to cough and they had some mild discomfort associated.  She has not had palpitations in over a year.  She does have history of Graves' disease and notes thyroid testing 2 months ago.  She otherwise had no shortness of breath, no diaphoresis or dizziness.  No fever or recent cold symptoms.    PAST MEDICAL HISTORY   has a past medical history of Graves disease (08/18/2023) and History of kidney stones.    SURGICAL HISTORY   has a past surgical history that includes cystoscopy stent placement (2017) and stent placement (10/2023).    FAMILY HISTORY  Family History   Problem Relation Age of Onset    Hypertension Mother     Eczema Father     No Known Problems Brother     Breast Cancer Paternal Aunt     Diabetes Paternal Uncle     Hypertension Maternal Grandmother        SOCIAL HISTORY  Social History     Tobacco Use    Smoking status: Former     Types: Cigarettes    Smokeless tobacco: Never   Vaping Use    Vaping status: Never Used   Substance and Sexual Activity    Alcohol use: Not Currently    Drug use: Not Currently    Sexual activity: Not on file       CURRENT MEDICATIONS  Home Medications       Reviewed by Fany Mcdonnell R.N. (Registered Nurse) on 06/04/24 at 1930  Med List Status: Partial     Medication Last Dose Status   ciclopirox (PENLAC) 8 % solution  Active   cyclobenzaprine (FLEXERIL) 5 mg tablet  Active  "  diclofenac sodium (VOLTAREN) 1 % Gel  Active   Magnesium 200 MG Tab  Active   methimazole (TAPAZOLE) 10 MG Tab  Active   norgestrel-ethinyl estradiol (LOW-OGESTREL) 0.3-30 MG-MCG Tab  Active   Potassium Citrate 15 MEQ (1620 MG) Tab CR  Active   propranolol (INDERAL) 10 MG Tab  Active   Vitamin D, Cholecalciferol, (CHOLECALCIFEROL) 25 MCG (1000 UT) Tab  Active                    ALLERGIES  Allergies   Allergen Reactions    Lactose      Other reaction(s): GI Intolerance       PHYSICAL EXAM  VITAL SIGNS: BP (!) 147/87   Pulse 79   Temp 36.6 °C (97.8 °F) (Temporal)   Resp 16   Ht 1.549 m (5' 1\")   Wt 80.4 kg (177 lb 4 oz)   LMP 05/12/2024   SpO2 96%   BMI 33.49 kg/m²    Results for orders placed or performed during the hospital encounter of 06/04/24   CBC WITH DIFFERENTIAL   Result Value Ref Range    WBC 8.8 4.8 - 10.8 K/uL    RBC 5.32 4.20 - 5.40 M/uL    Hemoglobin 15.7 12.0 - 16.0 g/dL    Hematocrit 45.6 37.0 - 47.0 %    MCV 85.7 81.4 - 97.8 fL    MCH 29.5 27.0 - 33.0 pg    MCHC 34.4 32.2 - 35.5 g/dL    RDW 41.8 35.9 - 50.0 fL    Platelet Count 304 164 - 446 K/uL    MPV 9.8 9.0 - 12.9 fL    Neutrophils-Polys 53.20 44.00 - 72.00 %    Lymphocytes 34.90 22.00 - 41.00 %    Monocytes 6.70 0.00 - 13.40 %    Eosinophils 4.20 0.00 - 6.90 %    Basophils 0.80 0.00 - 1.80 %    Immature Granulocytes 0.20 0.00 - 0.90 %    Nucleated RBC 0.00 0.00 - 0.20 /100 WBC    Neutrophils (Absolute) 4.70 1.82 - 7.42 K/uL    Lymphs (Absolute) 3.08 1.00 - 4.80 K/uL    Monos (Absolute) 0.59 0.00 - 0.85 K/uL    Eos (Absolute) 0.37 0.00 - 0.51 K/uL    Baso (Absolute) 0.07 0.00 - 0.12 K/uL    Immature Granulocytes (abs) 0.02 0.00 - 0.11 K/uL    NRBC (Absolute) 0.00 K/uL   COMP METABOLIC PANEL   Result Value Ref Range    Sodium 139 135 - 145 mmol/L    Potassium 3.9 3.6 - 5.5 mmol/L    Chloride 105 96 - 112 mmol/L    Co2 22 20 - 33 mmol/L    Anion Gap 12.0 7.0 - 16.0    Glucose 93 65 - 99 mg/dL    Bun 16 8 - 22 mg/dL    Creatinine 0.56 0.50 " - 1.40 mg/dL    Calcium 9.1 8.5 - 10.5 mg/dL    Correct Calcium 8.9 8.5 - 10.5 mg/dL    AST(SGOT) 54 (H) 12 - 45 U/L    ALT(SGPT) 37 2 - 50 U/L    Alkaline Phosphatase 135 (H) 30 - 99 U/L    Total Bilirubin 0.2 0.1 - 1.5 mg/dL    Albumin 4.3 3.2 - 4.9 g/dL    Total Protein 7.7 6.0 - 8.2 g/dL    Globulin 3.4 1.9 - 3.5 g/dL    A-G Ratio 1.3 g/dL   TSH WITH REFLEX TO FT4   Result Value Ref Range    TSH 3.760 0.380 - 5.330 uIU/mL   MAGNESIUM   Result Value Ref Range    Magnesium 2.4 1.5 - 2.5 mg/dL   TROPONIN   Result Value Ref Range    Troponin T <6 6 - 19 ng/L   ESTIMATED GFR   Result Value Ref Range    GFR (CKD-EPI) 123 >60 mL/min/1.73 m 2   EKG   Result Value Ref Range    Report       [facility] Emergency [facility]    Test Date:  2024  Pt Name:    WILLARD AVITIA    Department: ER  MRN:        [id number]                      Room:  Gender:     Female                       Technician: [id number]  :        1990                   Requested By:ER TRIAGE PROTOCOL  Order #:    [id number]                    Reading MD:    Measurements  Intervals                                Axis  Rate:       82                           P:          28  AR:         139                          QRS:        8  QRSD:       96                           T:          21  QT:         376  QTc:        439    Interpretive Statements  Sinus rhythm  Compared to ECG 10/13/2023 09:11:50  Right ventricular hypertrophy no longer present         EKG/LABS  82 bpm.  Normal sinus rhythm.  No acute ST or T wave changes.  I have independently interpreted this EKG    RADIOLOGY/PROCEDURES   I have independently interpreted the diagnostic imaging associated with this visit and am waiting the final reading from the radiologist.   My preliminary interpretation is as follows: no opacity, no effusion    Radiologist interpretation:  DX-CHEST-PORTABLE (1 VIEW)   Final Result         1.  No acute cardiopulmonary disease.          COURSE  & MEDICAL DECISION MAKING    ASSESSMENT, COURSE AND PLAN  Care Narrative:   33-year-old female with history of Graves' disease here for episode of resolved palpitations.  Afebrile and hypertensive  On exam no murmur, clear lungs, no lower extremity swelling.  Well-appearing.  Differential includes palpitations, electrolyte derangement, thyroid derangement, ACS, anemia  EKG is nonischemic, chest x-ray with no acute findings  Labs including thyroid studies are normal.  She is discharged advised follow-up with endocrinology and PCP.     ADDITIONAL PROBLEMS MANAGED  none    DISPOSITION AND DISCUSSIONS  I have discussed management of the patient with the following physicians and BREANNA's:  none    Discussion of management with other [facility] or appropriate source(s): None     Escalation of care considered, and ultimately not performed:Laboratory analysis, diagnostic imaging, and acute inpatient care management, however at this time, the patient is most appropriate for outpatient management    Barriers to care at this time, including but not limited to:  None .     Decision tools and prescription drugs considered including, but not limited to:  None .    FINAL DIAGNOSIS  1. Palpitations    2. Elevated blood pressure reading           Electronically signed by: Lamar Monge D.O., 6/4/2024 8:34 PM

## 2024-06-05 NOTE — ED TRIAGE NOTES
"Chief Complaint   Patient presents with    Palpitations     Started today, HR up to 112 while watching TV and SOB with cough also started.  +lightheadedness, 8/10 chest pressure.   Dx with graves disease last year       Pt ambulated with steady gait to triage. Pt A&Ox4, on room air. States only coughed that one time, states h/o high cholesterol.     Pt to lobby . Pt educated on alerting staff in changes to condition. Pt verbalized understanding. Protocol ordered.     BP (!) 166/110   Pulse 92   Temp 36.6 °C (97.8 °F) (Temporal)   Resp 18   Ht 1.549 m (5' 1\")   Wt 80.4 kg (177 lb 4 oz)   LMP 05/12/2024   SpO2 99%   BMI 33.49 kg/m²   "

## 2024-06-05 NOTE — DISCHARGE INSTRUCTIONS
Please follow-up with your primary care doctor for further discussion of your symptoms today as well as elevated blood pressure.

## 2024-06-06 ENCOUNTER — HOSPITAL ENCOUNTER (EMERGENCY)
Facility: MEDICAL CENTER | Age: 34
End: 2024-06-06
Attending: EMERGENCY MEDICINE
Payer: COMMERCIAL

## 2024-06-06 VITALS
WEIGHT: 177 LBS | DIASTOLIC BLOOD PRESSURE: 71 MMHG | HEIGHT: 61 IN | OXYGEN SATURATION: 97 % | SYSTOLIC BLOOD PRESSURE: 136 MMHG | BODY MASS INDEX: 33.42 KG/M2 | HEART RATE: 77 BPM | RESPIRATION RATE: 15 BRPM | TEMPERATURE: 97.5 F

## 2024-06-06 DIAGNOSIS — G43.001 MIGRAINE WITHOUT AURA AND WITH STATUS MIGRAINOSUS, NOT INTRACTABLE: ICD-10-CM

## 2024-06-06 LAB — EKG IMPRESSION: NORMAL

## 2024-06-06 PROCEDURE — 96375 TX/PRO/DX INJ NEW DRUG ADDON: CPT

## 2024-06-06 PROCEDURE — 700111 HCHG RX REV CODE 636 W/ 250 OVERRIDE (IP): Performed by: EMERGENCY MEDICINE

## 2024-06-06 PROCEDURE — 99284 EMERGENCY DEPT VISIT MOD MDM: CPT

## 2024-06-06 PROCEDURE — 93005 ELECTROCARDIOGRAM TRACING: CPT

## 2024-06-06 PROCEDURE — 96374 THER/PROPH/DIAG INJ IV PUSH: CPT

## 2024-06-06 PROCEDURE — 93005 ELECTROCARDIOGRAM TRACING: CPT | Performed by: EMERGENCY MEDICINE

## 2024-06-06 PROCEDURE — 700105 HCHG RX REV CODE 258: Performed by: EMERGENCY MEDICINE

## 2024-06-06 RX ORDER — DIPHENHYDRAMINE HYDROCHLORIDE 50 MG/ML
25 INJECTION INTRAMUSCULAR; INTRAVENOUS ONCE
Status: COMPLETED | OUTPATIENT
Start: 2024-06-06 | End: 2024-06-06

## 2024-06-06 RX ORDER — METOCLOPRAMIDE HYDROCHLORIDE 5 MG/ML
10 INJECTION INTRAMUSCULAR; INTRAVENOUS ONCE
Status: COMPLETED | OUTPATIENT
Start: 2024-06-06 | End: 2024-06-06

## 2024-06-06 RX ORDER — SODIUM CHLORIDE 9 MG/ML
1000 INJECTION, SOLUTION INTRAVENOUS ONCE
Status: COMPLETED | OUTPATIENT
Start: 2024-06-06 | End: 2024-06-06

## 2024-06-06 RX ORDER — KETOROLAC TROMETHAMINE 15 MG/ML
15 INJECTION, SOLUTION INTRAMUSCULAR; INTRAVENOUS ONCE
Status: COMPLETED | OUTPATIENT
Start: 2024-06-06 | End: 2024-06-06

## 2024-06-06 RX ADMIN — METOCLOPRAMIDE 10 MG: 5 INJECTION, SOLUTION INTRAMUSCULAR; INTRAVENOUS at 15:45

## 2024-06-06 RX ADMIN — DIPHENHYDRAMINE HYDROCHLORIDE 25 MG: 50 INJECTION, SOLUTION INTRAMUSCULAR; INTRAVENOUS at 15:46

## 2024-06-06 RX ADMIN — KETOROLAC TROMETHAMINE 15 MG: 15 INJECTION, SOLUTION INTRAMUSCULAR; INTRAVENOUS at 15:44

## 2024-06-06 RX ADMIN — SODIUM CHLORIDE 1000 ML: 9 INJECTION, SOLUTION INTRAVENOUS at 15:45

## 2024-06-06 ASSESSMENT — FIBROSIS 4 INDEX: FIB4 SCORE: 0.96

## 2024-06-06 ASSESSMENT — PAIN DESCRIPTION - PAIN TYPE: TYPE: ACUTE PAIN

## 2024-06-06 NOTE — ED TRIAGE NOTES
"Chief Complaint   Patient presents with    Headache     Since Tuesday, +light sensitivity and intermittent blurred vision. +nausea'  Ibuprofen 600 mg at 1pm with no change  Pt reports hx of migraines in her early 20s.        32 yo F to triage for above complaint. Denies hx of HTN     Pt placed in lobby. Pt educated on triage process. Pt encouraged to alert staff for any changes.     Patient and staff wearing appropriate PPE    BP (!) 186/110   Pulse 100   Temp 36.4 °C (97.6 °F) (Temporal)   Resp 18   Ht 1.549 m (5' 1\")   Wt 80.3 kg (177 lb)   LMP 05/12/2024   SpO2 96%   BMI 33.44 kg/m²     "

## 2024-06-06 NOTE — ED PROVIDER NOTES
ED Provider Note    CHIEF COMPLAINT  Chief Complaint   Patient presents with    Headache     Since Tuesday, +light sensitivity and intermittent blurred vision. +nausea'  Ibuprofen 600 mg at 1pm with no change  Pt reports hx of migraines in her early 20s.        EXTERNAL RECORDS REVIEWED  Reviewed extensive evaluation including laboratory studies related to ER visit 2 days ago    HPI/ROS  LIMITATION TO HISTORY   None  OUTSIDE HISTORIAN(S):  None    Deisy Gilbert is a 33 y.o. female who presents for evaluation of bifrontal headache, mild nausea, scotoma light and sound sensitivity.  The patient has a known history of migraines.  She had them when she was a teenager in early 20s and then has not had one for a while.  She was seen here 2 days ago for palpitations and had an extensive evaluation including blood test thyroid studies EKG and chest x-ray which was unremarkable.  She currently denies any chest pain or palpitations.  She has tried over-the-counter ibuprofen and Tylenol with no relief.  She denies pregnancy.  No recent head injury.  No neck stiffness or rash.  No report of any neurological deficits or complaints such as numbness tingling weakness to the arms legs or face difficulty word finding or ataxia.  She does report some occasional blurry vision with scotoma as well as light and sound sensitivity.    PAST MEDICAL HISTORY   has a past medical history of Graves disease (08/18/2023) and History of kidney stones.    SURGICAL HISTORY   has a past surgical history that includes cystoscopy stent placement (2017) and stent placement (10/2023).    FAMILY HISTORY  Family History   Problem Relation Age of Onset    Hypertension Mother     Eczema Father     No Known Problems Brother     Breast Cancer Paternal Aunt     Diabetes Paternal Uncle     Hypertension Maternal Grandmother        SOCIAL HISTORY  Social History     Tobacco Use    Smoking status: Former     Types: Cigarettes    Smokeless tobacco: Never  "  Vaping Use    Vaping status: Never Used   Substance and Sexual Activity    Alcohol use: Not Currently    Drug use: Not Currently    Sexual activity: Not on file       CURRENT MEDICATIONS  Home Medications       Reviewed by Annmarie Montanez R.N. (Registered Nurse) on 06/06/24 at 1415  Med List Status: Not Addressed     Medication Last Dose Status   ciclopirox (PENLAC) 8 % solution  Active   cyclobenzaprine (FLEXERIL) 5 mg tablet  Active   diclofenac sodium (VOLTAREN) 1 % Gel  Active   Magnesium 200 MG Tab  Active   methimazole (TAPAZOLE) 10 MG Tab  Active   norgestrel-ethinyl estradiol (LOW-OGESTREL) 0.3-30 MG-MCG Tab  Active   Potassium Citrate 15 MEQ (1620 MG) Tab CR  Active   propranolol (INDERAL) 10 MG Tab  Active   Vitamin D, Cholecalciferol, (CHOLECALCIFEROL) 25 MCG (1000 UT) Tab  Active                    ALLERGIES  Allergies   Allergen Reactions    Lactose      Other reaction(s): GI Intolerance       PHYSICAL EXAM  VITAL SIGNS: BP (!) 186/110   Pulse 100   Temp 36.4 °C (97.6 °F) (Temporal)   Resp 18   Ht 1.549 m (5' 1\")   Wt 80.3 kg (177 lb)   LMP 05/12/2024   SpO2 96%   BMI 33.44 kg/m²    Pulse ox interpretation: I interpret this pulse ox as normal.  Constitutional: Alert and oriented x 3, no acute distress  HEENT: Atraumatic normocephalic, pupils are equal round reactive to light extraocular movements are intact. The nares is clear, external ears are normal, mouth shows moist mucous membranes normal dentition for age  Neck: Supple, no JVD no tracheal deviation  Cardiovascular: Regular rate and rhythm no murmur rub or gallop 2+ pulses peripherally x4  Thorax & Lungs: No respiratory distress, no wheezes rales or rhonchi, No chest tenderness.   GI: Soft nontender nondistended positive bowel sounds, no peritoneal signs  Skin: Warm dry no acute rash or lesion  Musculoskeletal: Moving all extremities with full range and 5 of 5 strength no acute  deformity  Neurologic: Cranial nerves III through XII are " grossly intact no sensory deficit no cerebellar dysfunction finger-nose testing is normal no pronator drift of the arms or leg speech patterns normal sensorium normal GCS 15 NIH stroke scale score of 0  Psychiatric: Appropriate affect for situation at this time          EKG/LABS  Consider repeat laboratory studies were reviewed extensive laboratory studies from 48 hours ago  RADIOLOGY/PROCEDURES   Consider CT scan and/or MRI of the head  COURSE & MEDICAL DECISION MAKING    ASSESSMENT, COURSE AND PLAN  Care Narrative:     This is a very pleasant 33-year-old female presents here with signs and symptoms of what I would consider a classic migraine.  She has light and sound sensitivity nausea scotoma.  She has tried over-the-counter medications with no improvement.  I reviewed her extensive visit and workup from 48 hours ago for palpitations.  There are laboratory studies including thyroid studies electrolytes chest x-ray EKG are all reassuring and normal.  Here she has reassuring vital signs other than moderate hypertension and slight tachycardia that both improved after standard migraine cocktail.  The patient was given IV fluids Reglan Benadryl and Toradol and observed for around 2 hours.  I performed serial neurological exams and she has no deficits.  She has no ataxia or fever or neck stiffness to suggest meningitis.  I considered brain imaging but with classic migraine history, previous history of migraine and improvement with standard migraine therapy I did not feel that brain imaging is indicated today.  I counseled her that if she develops new or worsening symptoms to return immediately for repeat evaluation.    Hydration: Based on the patient's presentation of Acute Vomiting the patient was given IV fluids. IV Hydration was used because oral hydration was not adequate alone. Upon recheck following hydration, the patient was improved.          ADDITIONAL PROBLEMS MANAGED      DISPOSITION AND DISCUSSIONS  I have  discussed management of the patient with the following physicians and BREANNA's: None    Discussion of management with other QHP or appropriate source(s): None    Escalation of care considered, and ultimately not performed: Considered brain imaging    Barriers to care at this time, including but not limited to: None.     Decision tools and prescription drugs considered including, but not limited to: NIH stroke scale score was utilized.    FINAL DIAGNOSIS  Classic migraine       Electronically signed by: Nolan Boles M.D., 6/6/2024 3:18 PM

## 2024-06-07 NOTE — PROCEDURES
DIAGNOSTIC HOME SLEEP TEST (HST) REPORT WatchPAT      PATIENT ID:  NAME:  Deisy Gilbert  MRN:               [id number]  YOB: 1990  DATE OF STUDY: 5/31/2024      Impression:     This study shows evidence of:      1. Mild to Moderate obstructive sleep apnea with PAT apnea hypopnea index(pAHI) of 14 per hour.  PAT respiratory disturbance index (pRDI) was 19.8 per hour. These findings are based on 7 channels recording of PAT signal with sleep staging, heart rate, pulse oximetry, actigraphy, body position, snoring and respiratory movement.     2. Oxygenation O2 Sat. mean O2 sat was 94%,  karen was 86%,  and maximum O2 at 98 %. O2 sat was at or  below 88% for 0.3 min of evaluation time. Oxygen Desaturation (>=4%) Index was 5.2/hr. AVG HR was 65 BPM.      TECHNICAL DESCRIPTION: Patient underwent home sleep apnea testing with peripheral arterial tone signal (WatchPAT™). This is a Type IV portable monitor and device per Medicare. Monitoring was done with 7 channels recording of PAT signal with sleep staging, heart rate, pulse oximetry, actigraphy, body position, snoring and respiratory movement. Prior to using the device, the patient received verbal and written instructions for its application and was provided with the help desk phone number for additional telephonic instruction with 24-hour availability of qualified personnel to answer questions.    Respiratory events:        General sleep summary: . Total recording time is 7 hours and 58 minutes and total Sleep time is 7 hours and 15 minutes. The patient spent 232.8 minutes in the supine position and 203 minutes in the nonsupine position.      Recommendations:    1. CPAP titration study vs Auto CPAP trial.  If starting auto CPAP recommended pressure 4 cmH2O maximum pressure 12 cmH2O  2. In general patients with sleep apnea are advised to avoid alcohol and sedatives and to not operate a motor vehicle while drowsy. In some cases alternative  treatment options may prove effective in resolving sleep apnea in these options include upper airway surgery, the use of a dental orthotic or weight loss and positional therapy. Clinical correlation is required.         Charlie Perez MD

## 2024-06-17 ENCOUNTER — HOSPITAL ENCOUNTER (OUTPATIENT)
Dept: LAB | Facility: MEDICAL CENTER | Age: 34
End: 2024-06-17
Attending: STUDENT IN AN ORGANIZED HEALTH CARE EDUCATION/TRAINING PROGRAM
Payer: COMMERCIAL

## 2024-06-17 DIAGNOSIS — E05.90 HYPERTHYROIDISM: ICD-10-CM

## 2024-06-17 DIAGNOSIS — E03.2 IATROGENIC HYPOTHYROIDISM: ICD-10-CM

## 2024-06-17 LAB
T4 FREE SERPL-MCNC: 1.11 NG/DL (ref 0.93–1.7)
TSH SERPL DL<=0.005 MIU/L-ACNC: 3.09 UIU/ML (ref 0.38–5.33)

## 2024-06-17 PROCEDURE — 36415 COLL VENOUS BLD VENIPUNCTURE: CPT

## 2024-06-17 PROCEDURE — 84439 ASSAY OF FREE THYROXINE: CPT

## 2024-06-17 PROCEDURE — 84443 ASSAY THYROID STIM HORMONE: CPT

## 2024-06-21 NOTE — PROGRESS NOTES
Follow up Endocrinology Visit  Initial consult/last visit on: 9/7/23  Last visit on: 4/12/24  Referred by:  Nanci Lucero M.D.    Chief complaint:  Deisy Gilbert, is a very pleasant 32 y.o.female, who is here for follow up for Graves disease    Interval history:   - overall doing well  - eye symptoms improved but still sometimes has intermittent swelling; joint pain improved  - she also reports fatigue, not able to do hikes she was doing prior Dx of Graves disease  - she recently had 2 ED visits - 1st with palpitations, SBP up to 220 mmHg, second with migraine and elevated BP, she admits having panic attacks 15 years ago but she does not believe it was it  - she was measuring her BP at home, and it was < 140/85 mmHg  - her PCP is on vacation, she was able to schedule follow up appointment only in 9/2024  - reviewed recent labs  Prior notes:  - since last visit feeling much better: tremor stopped, muscle weakness resolved, able to squat without any problem, currently using cane intermittently with back pains, which she associates with kidney stones  - diarrhea improved, but not resolved, patient asked for referral to GI specialist from PCP  - reports new right eye periorbital edema, gritty sensation, light sensitivity, ptosis, intermittent blurry vision, scheduled with neuro-ophthalmologist on 10/17/2023  - plans for surgical kidney stones removal on 10/18/2023  - reviewed with the patient new labs and thyroid US  11/09/23  - overall doing much better, however still feels very tired,  despite sleeping well  - gained couple lb since last visit  - still has diarrhea, even it much improved since initiation Rx with MMI; plans for GI evaluation  - had surgery for kidney stone removal, unfortunately, kidney stone was no evaluated for composition  - still has R eye ptosis and tearing, myasthenia gravis was ruled out  - followed by neuro-ophthalmologist, for now recommended active surveillance  - recent labs were  "reviewed  2/09/24  - patient developed disturbing knee pains, noted elevated MEGHAN, was seen by rheumatologist, there is a concern of possible MMI-induced SLE  - patient continues to feel fatigue  - eye \"are getting better\", she still feels gritty  sensation, uses artifical tears, denies blurry/double vision   4/12/24  - patient felt better after increasing dose of MMI, but later on feeling more fatigued, reports \"brain fog\"  - continues to have some L knee pain, follows rheumatologist who does not believe patient has MMI induced SLE  - noted blurry vision    ALEJANDRO symptoms:  Dry, gritty eye  sensation - in R eye  Light sensitivity - in R eye  Tearing - no  Red eye - no  Proptosis -  R eye  Pain or pressure behind the eye - pressure behind R eye  Retroorbital pain - no  Pain with eye movement  - no   Eyelid retraction -  no  Eyelid swelling - R eye  Discomfort or pain in or behind the eye with looking L/R or up/down - no  Double vision - no  Blurry vision - intermittently  Color vision loss - no  Vision loss - no    Hyperthyroidism HPI:  - was feeling tired for many months  - she lost 11 lb but associated with recent loss of her friend  - since beginning of 8/2023 patient noted nausea, diarrhea (having up to 4 BMS/day, watery stools), later on she noted palpitations  - she also noted worsening of her anxiety, reports  insomnia, sweating/hot flashes, heaviness on her chest, hand tremors, muscle weakness to the point that she needs to use cane to walk - persistent  - on labs from 8/23/23 - biochemical evidence of hyperthyroidism + elevated TrAbs  - was started on MMI 30 mg/day + propranolol 10 mg TID - no improvement of symptoms so far  - nonsmoker  - Fhx - thyroid disease in her aunt, not sure about the diagnosis    Medications:  Current Outpatient Medications:     Magnesium 200 MG Tab, Take 200 mg by mouth every day., Disp: , Rfl:     diclofenac sodium (VOLTAREN) 1 % Gel, Apply 2 g topically 4 times a day as needed " "(back pain)., Disp: 50 g, Rfl: 0    cyclobenzaprine (FLEXERIL) 5 mg tablet, Take 1 Tablet by mouth 3 times a day as needed for Muscle Spasms., Disp: 30 Tablet, Rfl: 0    ciclopirox (PENLAC) 8 % solution, Apply evenly over entire nail plate nightly to all affected nails., Disp: 6 mL, Rfl: 3    Vitamin D, Cholecalciferol, (CHOLECALCIFEROL) 25 MCG (1000 UT) Tab, Take 1,000 Units by mouth every day., Disp: , Rfl:     propranolol (INDERAL) 10 MG Tab, TAKE 1 TABLET BY MOUTH 3 TIMES A DAY, Disp: 270 Tablet, Rfl: 1    methimazole (TAPAZOLE) 10 MG Tab, Take 1.5 Tablets by mouth every day., Disp: 140 Tablet, Rfl: 4    norgestrel-ethinyl estradiol (LOW-OGESTREL) 0.3-30 MG-MCG Tab, Take 1 Tablet by mouth every day., Disp: 84 Tablet, Rfl: 4    Potassium Citrate 15 MEQ (1620 MG) Tab CR, , Disp: , Rfl:     Physical Examination:   Vital signs: /82   Pulse 85   Ht 1.549 m (5' 1\")   Wt 78.9 kg (174 lb)   LMP 06/06/2024   SpO2 94%   BMI 32.88 kg/m²   General: No distress, cooperative, well dressed and well nourished.   Resp: Normal effort.  CVS: Regular rate and rhythm.  Extremities: No edema bilateral extremities  Neuro: Alert and oriented.   Skin: No rash, No Ulcers  Psych: Normal mood and affect    Labs:   Reference Range  08/22/23 08/23/23 09/29/23 11/06/23 02/05/24 02/07/24 04/06/24 04/26/24 06/04/24 06/17/24    TSH 0.380 - 5.330 uIU/mL <0.005 (L) <0.005 (L) <0.005 (L) <0.005 (L) <0.005 (L)  3.180 3.250 3.760 3.090   fT4 0.93 - 1.70 ng/dL 5.20 (H) 5.87 (H) 0.98 1.56 1.22  0.90 (L) 0.94  1.11   T3 60.0 - 181.0 ng/dL   165.0    149.0      fT3 2.00 - 4.40 pg/mL  19.90 (H)           TPO- Abs 0.0 - 9.0 IU/mL      246.0 (H)       Tg- Abs  0.0 - 4.0 IU/mL      1.0       TrAbs <=1.75 IU/L  9.72 (H)           MMI mg  30 10 10 10 15 15 10 10 10     Imaging:  - reviewed  Thyroid US on 10/2/2023:  HISTORY/REASON FOR EXAM:  Hyperthyroidism/Graves disease, thyroid gland enlargement.  TECHNIQUE/EXAM DESCRIPTION:  Ultrasound " of the soft tissues of the head and neck.  COMPARISON:  None  FINDINGS:  The thyroid gland is heterogeneous.  Vascularity is increased.  The right lobe of the thyroid gland measures 2.63 cm x 4.56 cm x 2.41 cm. The contour and echogenicity are normal. No focal mass lesions are identified.  The left lobe of the thyroid gland measures 2.08 cm x 4.21 cm x 1.73 cm. The contour and echogenicity are normal. No focal mass lesions are identified.  The isthmus measures 0.54 cm.  IMPRESSION:  1.  Mildly enlarged heterogeneous thyroid gland with hyperemia. Findings are consistent with Graves' disease.    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 - periodic hypokalemic paralysis and thyrotoxic periodic  paralysis are unlikely given persistent nature of muscle weakness vs myopathic attacks, already on propranolol - treatment for thyrotoxic periodic paralysis  - appropriately started on MMI and nonselective beta-blocker  - we discussed cause and natural history of the Graves disease, treatment approaches  - I explained that it takes up to 3-4 weeks to reach euthyroidism  - clinical and biochemical improvement of hyperthyroidism with normalization of free T4, TSH is still suppressed but likely is lagging in response  - we will decrease MMI to maintenance dose of 10 mg a day  - patient continues to have diarrhea, unlikely related to hyperthyroidism, celiac disease work-up was negative, agree with PCP with GI referral  - new symptoms consistent with right thyroid eye disease, referred to  "neuro-ophthalmologist  2/09/23  - on maintenance dose of MMI, fT4 wnl, TSH is still suppressed - likely indicates that patient needs higher dose of MMI  - concerns of MMI- induced SLE, follows rheumatologist  - had extensive discussion of possible approaches, there is a high risk of having SLE even with transition to PTU - will discuss with rheumatologist if that is reasonable, if neither of thionamides could be used -> consider surgical therapy, as GIRON might exacerbate ALEJANDRO  - discussed consequences of thyroidectomy and principles of replacement therapy   - patient will continue evaluation by rheumatology, will update me about plans, if transition to PTU is reasonable, if continues to take MMI -> consider increase the dose to 15 mg/day.   - ALEJANDRO improved  4/12/24  - improvement of symptoms after increasing dose of MMI  during the last visit, but then feels more fatigued, \"brain fogged\"  - on labs iatrogenic hypothyroidism  - will taper down MMI 15 -> 10 mg  - repeat fT4 in 2 weeks, TSH in 2 mo  - patient noted worsening of ALEJANDRO symptoms, eye exam unremarkable but patient reports blurry vision with down gaze, plans to see neuroophthalmologist    Plan:  Continue MMI  10 mg/day  Repeat TFTs in 6 weeks.   Follow neuro-ophthalmologist recommendations.  Meanwhile, use sunglasses, artificial teardrops, avoid primary and secondary smoking    # Palpitations  # Elevated BP  - patient does not believe it was a panic attack, denies dehydration  - given young age of the patient it is reasonable to rule out pheochromocytoma, hyperaldosteronism  Plan:  Obtain plasma metanephrines (preferred over urine only for the patient's convenience), K/aldosterone/renin.     # Chronic fatigue  - could be multifactorial  - unlikely to be explained by thyroid issues as she is currently euthyroid  - will rule out adrenal insufficiency, as pt is at higher risk of other autoimmune diseases, however, unlikely, given recent BP elevation  Plan:  Obtain " 8 am cortisol level    RTC: 7 weeks  Total time (face-to-face and non-face-to face time):  40 min  Plan reviewed with the patient and agreed with plan.  All questions answered to patient's satisfaction.  Thank you kindly for allowing me to participate in the care plan for this patient.    Kamilah Danielson MD    CC:   Nanci Lucero M.D.

## 2024-06-24 ENCOUNTER — OFFICE VISIT (OUTPATIENT)
Dept: ENDOCRINOLOGY | Facility: MEDICAL CENTER | Age: 34
End: 2024-06-24
Attending: STUDENT IN AN ORGANIZED HEALTH CARE EDUCATION/TRAINING PROGRAM
Payer: COMMERCIAL

## 2024-06-24 VITALS
OXYGEN SATURATION: 94 % | HEART RATE: 85 BPM | BODY MASS INDEX: 32.85 KG/M2 | SYSTOLIC BLOOD PRESSURE: 119 MMHG | DIASTOLIC BLOOD PRESSURE: 82 MMHG | WEIGHT: 174 LBS | HEIGHT: 61 IN

## 2024-06-24 DIAGNOSIS — R03.0 ELEVATED BLOOD PRESSURE READING: ICD-10-CM

## 2024-06-24 DIAGNOSIS — E05.00 GRAVES DISEASE: ICD-10-CM

## 2024-06-24 DIAGNOSIS — R00.2 PALPITATIONS: ICD-10-CM

## 2024-06-24 DIAGNOSIS — R53.82 CHRONIC FATIGUE: ICD-10-CM

## 2024-06-24 PROCEDURE — 99211 OFF/OP EST MAY X REQ PHY/QHP: CPT | Performed by: STUDENT IN AN ORGANIZED HEALTH CARE EDUCATION/TRAINING PROGRAM

## 2024-06-24 ASSESSMENT — FIBROSIS 4 INDEX: FIB4 SCORE: 0.96

## 2024-08-07 ENCOUNTER — APPOINTMENT (OUTPATIENT)
Dept: SLEEP MEDICINE | Facility: MEDICAL CENTER | Age: 34
End: 2024-08-07
Attending: STUDENT IN AN ORGANIZED HEALTH CARE EDUCATION/TRAINING PROGRAM
Payer: COMMERCIAL

## 2024-08-09 ENCOUNTER — HOSPITAL ENCOUNTER (OUTPATIENT)
Dept: LAB | Facility: MEDICAL CENTER | Age: 34
End: 2024-08-09
Attending: STUDENT IN AN ORGANIZED HEALTH CARE EDUCATION/TRAINING PROGRAM
Payer: COMMERCIAL

## 2024-08-09 DIAGNOSIS — R03.0 ELEVATED BLOOD PRESSURE READING: ICD-10-CM

## 2024-08-09 PROCEDURE — 36415 COLL VENOUS BLD VENIPUNCTURE: CPT

## 2024-08-09 PROCEDURE — 82533 TOTAL CORTISOL: CPT

## 2024-08-09 PROCEDURE — 82088 ASSAY OF ALDOSTERONE: CPT

## 2024-08-09 PROCEDURE — 84244 ASSAY OF RENIN: CPT

## 2024-08-09 PROCEDURE — 83835 ASSAY OF METANEPHRINES: CPT

## 2024-08-09 PROCEDURE — 80053 COMPREHEN METABOLIC PANEL: CPT

## 2024-08-09 PROCEDURE — 84439 ASSAY OF FREE THYROXINE: CPT

## 2024-08-09 PROCEDURE — 84443 ASSAY THYROID STIM HORMONE: CPT

## 2024-08-10 LAB
ALBUMIN SERPL BCP-MCNC: 4.1 G/DL (ref 3.2–4.9)
ALBUMIN/GLOB SERPL: 1.4 G/DL
ALP SERPL-CCNC: 115 U/L (ref 30–99)
ALT SERPL-CCNC: 27 U/L (ref 2–50)
ANION GAP SERPL CALC-SCNC: 15 MMOL/L (ref 7–16)
AST SERPL-CCNC: 23 U/L (ref 12–45)
BILIRUB SERPL-MCNC: 0.3 MG/DL (ref 0.1–1.5)
BUN SERPL-MCNC: 10 MG/DL (ref 8–22)
CALCIUM ALBUM COR SERPL-MCNC: 8.8 MG/DL (ref 8.5–10.5)
CALCIUM SERPL-MCNC: 8.9 MG/DL (ref 8.5–10.5)
CHLORIDE SERPL-SCNC: 106 MMOL/L (ref 96–112)
CO2 SERPL-SCNC: 18 MMOL/L (ref 20–33)
CORTIS SERPL-MCNC: 20.3 UG/DL (ref 0–23)
CREAT SERPL-MCNC: 0.64 MG/DL (ref 0.5–1.4)
GFR SERPLBLD CREATININE-BSD FMLA CKD-EPI: 119 ML/MIN/1.73 M 2
GLOBULIN SER CALC-MCNC: 3 G/DL (ref 1.9–3.5)
GLUCOSE SERPL-MCNC: 127 MG/DL (ref 65–99)
POTASSIUM SERPL-SCNC: 4.2 MMOL/L (ref 3.6–5.5)
PROT SERPL-MCNC: 7.1 G/DL (ref 6–8.2)
SODIUM SERPL-SCNC: 139 MMOL/L (ref 135–145)
T4 FREE SERPL-MCNC: 1.11 NG/DL (ref 0.93–1.7)
TSH SERPL-ACNC: 4.18 UIU/ML (ref 0.35–5.5)

## 2024-08-11 LAB — ALDOST SERPL-MCNC: 18.2 NG/DL

## 2024-08-13 ENCOUNTER — OFFICE VISIT (OUTPATIENT)
Dept: MEDICAL GROUP | Facility: MEDICAL CENTER | Age: 34
End: 2024-08-13
Payer: COMMERCIAL

## 2024-08-13 ENCOUNTER — TELEPHONE (OUTPATIENT)
Dept: ENDOCRINOLOGY | Facility: MEDICAL CENTER | Age: 34
End: 2024-08-13
Payer: COMMERCIAL

## 2024-08-13 VITALS
OXYGEN SATURATION: 96 % | DIASTOLIC BLOOD PRESSURE: 60 MMHG | WEIGHT: 172.6 LBS | SYSTOLIC BLOOD PRESSURE: 130 MMHG | HEART RATE: 73 BPM | BODY MASS INDEX: 32.59 KG/M2 | TEMPERATURE: 97.3 F | HEIGHT: 61 IN

## 2024-08-13 DIAGNOSIS — F41.9 ANXIETY: ICD-10-CM

## 2024-08-13 DIAGNOSIS — G43.919 INTRACTABLE MIGRAINE WITHOUT STATUS MIGRAINOSUS, UNSPECIFIED MIGRAINE TYPE: ICD-10-CM

## 2024-08-13 DIAGNOSIS — E53.8 B12 DEFICIENCY: ICD-10-CM

## 2024-08-13 DIAGNOSIS — G47.33 OSA (OBSTRUCTIVE SLEEP APNEA): ICD-10-CM

## 2024-08-13 LAB
METANEPHS SERPL-SCNC: <0.1 NMOL/L (ref 0–0.49)
NORMETANEPHRINE SERPL-SCNC: 0.27 NMOL/L (ref 0–0.89)
RENIN PLAS-CCNC: 2.1 NG/ML/HR

## 2024-08-13 PROCEDURE — 3078F DIAST BP <80 MM HG: CPT | Performed by: STUDENT IN AN ORGANIZED HEALTH CARE EDUCATION/TRAINING PROGRAM

## 2024-08-13 PROCEDURE — 3075F SYST BP GE 130 - 139MM HG: CPT | Performed by: STUDENT IN AN ORGANIZED HEALTH CARE EDUCATION/TRAINING PROGRAM

## 2024-08-13 PROCEDURE — 99214 OFFICE O/P EST MOD 30 MIN: CPT | Performed by: STUDENT IN AN ORGANIZED HEALTH CARE EDUCATION/TRAINING PROGRAM

## 2024-08-13 RX ORDER — CYANOCOBALAMIN 1000 UG/ML
1000 INJECTION, SOLUTION INTRAMUSCULAR; SUBCUTANEOUS ONCE
Status: COMPLETED | OUTPATIENT
Start: 2024-08-13 | End: 2024-08-13

## 2024-08-13 RX ORDER — PROPRANOLOL HYDROCHLORIDE 10 MG/1
1 TABLET ORAL 3 TIMES DAILY
COMMUNITY

## 2024-08-13 RX ORDER — HYDROXYZINE HYDROCHLORIDE 25 MG/1
25 TABLET, FILM COATED ORAL
Qty: 30 TABLET | Refills: 0 | Status: SHIPPED | OUTPATIENT
Start: 2024-08-13

## 2024-08-13 RX ORDER — SUMATRIPTAN 50 MG/1
50 TABLET, FILM COATED ORAL
Qty: 10 TABLET | Refills: 3 | Status: SHIPPED | OUTPATIENT
Start: 2024-08-13

## 2024-08-13 RX ORDER — METHIMAZOLE 10 MG/1
1.5 TABLET ORAL DAILY
COMMUNITY
End: 2024-08-13

## 2024-08-13 RX ORDER — NAPROXEN 500 MG/1
TABLET ORAL
COMMUNITY
End: 2024-08-13

## 2024-08-13 RX ADMIN — CYANOCOBALAMIN 1000 MCG: 1000 INJECTION, SOLUTION INTRAMUSCULAR; SUBCUTANEOUS at 14:26

## 2024-08-13 ASSESSMENT — PATIENT HEALTH QUESTIONNAIRE - PHQ9
SUM OF ALL RESPONSES TO PHQ QUESTIONS 1-9: 0
3. TROUBLE FALLING OR STAYING ASLEEP OR SLEEPING TOO MUCH: NOT AT ALL
2. FEELING DOWN, DEPRESSED, IRRITABLE, OR HOPELESS: NOT AT ALL
1. LITTLE INTEREST OR PLEASURE IN DOING THINGS: NOT AT ALL
7. TROUBLE CONCENTRATING ON THINGS, SUCH AS READING THE NEWSPAPER OR WATCHING TELEVISION: NOT AT ALL
9. THOUGHTS THAT YOU WOULD BE BETTER OFF DEAD, OR OF HURTING YOURSELF: NOT AT ALL
8. MOVING OR SPEAKING SO SLOWLY THAT OTHER PEOPLE COULD HAVE NOTICED. OR THE OPPOSITE, BEING SO FIGETY OR RESTLESS THAT YOU HAVE BEEN MOVING AROUND A LOT MORE THAN USUAL: NOT AT ALL
5. POOR APPETITE OR OVEREATING: NOT AT ALL
6. FEELING BAD ABOUT YOURSELF - OR THAT YOU ARE A FAILURE OR HAVE LET YOURSELF OR YOUR FAMILY DOWN: NOT AL ALL
4. FEELING TIRED OR HAVING LITTLE ENERGY: NOT AT ALL
SUM OF ALL RESPONSES TO PHQ9 QUESTIONS 1 AND 2: 0

## 2024-08-13 ASSESSMENT — FIBROSIS 4 INDEX: FIB4 SCORE: 0.48

## 2024-08-13 NOTE — PATIENT INSTRUCTIONS
supplementing:  - magnesium: 400-600 mg once or 200-300 mg twice daily  - riboflavin (vitamin B2): 400 mg once daily  - coenzyme Q10: 300 mg daily    - get 7-9 hours of sleep per night; can try supplementing melatonin 2-10 mg, 2-3 hours before bedtime  - drink plenty of fluids (urine should be nearly clear)  - avoid excessive caffeine intake (no more than 2 servings per day and nothing in the afternoon)  - eat regular meals (don't skip meals)  - get moderate exercise (even just a 20 minute walk daily)

## 2024-08-13 NOTE — PROGRESS NOTES
Subjective:     CC:   Chief Complaint   Patient presents with    [facility] Follow-up         HPI:   Deisy presents today with  Verbal consent was acquired by the patient to use [employer] ambient listening note generation during this visit Yes   History of Present Illness  The patient is a 33-year-old female who is here for follow-up.    She is experiencing sleep disturbances, which she attributes to her part-time work and school commitments. She is uncertain if the CPAP machine's pressure is causing her to wake up every hour, a symptom she has not encountered before. She typically sleeps when extremely tired. She has now uses a nasal mask.. She has an upcoming appointment with her doctor this Friday. Despite sleeping for 7 hours with the CPAP machine on one occasion, she did not notice a significant difference and woke up feeling grumpy.    Her endocrinologist has requested additional labs.    She has been experiencing headaches, which she attributes to inadequate water intake and sleep. After a hospital visit, a doctor suggested magnesium, which seemed to alleviate her headaches. The doctor suspected a migraine due to concerns of a stroke due to her ER visit the day before, but ruled out a stroke. She has not had a migraine in years. She has not used Imitrex in the past. After working out, she feels energetic, but around 11:30, her headaches return.    Supplemental Information  She is working hard to lose weight, fasting and checking her weight, and she has lost at least 12 pounds. She is taking methimazole 10 mg 1 pill.    Results  Laboratory Studies  Fasting blood sugars were 87. Cholestyramine showed fat deposits in the liver region and related thyroid issues. Electrolytes were okay. Bicarbonate was slightly off.         Health Maintenance: Completed    ROS:  ROS    Review of systems unremarkable except for concerns noted by patient or items listed.    Please see HPI for  "additional ROS.      Objective:     Exam:  /60 (BP Location: Left arm, Patient Position: Sitting, BP Cuff Size: Adult)   Pulse 73   Temp 36.3 °C (97.3 °F) (Temporal)   Ht 1.549 m (5' 1\")   Wt 78.3 kg (172 lb 9.6 oz)   LMP 08/04/2024   SpO2 96%   BMI 32.61 kg/m²  Body mass index is 32.61 kg/m².    Physical Exam  Constitutional:       Appearance: Normal appearance.   HENT:      Head: Normocephalic.   Eyes:      General: No scleral icterus.  Cardiovascular:      Rate and Rhythm: Normal rate and regular rhythm.      Pulses: Normal pulses.      Heart sounds: Normal heart sounds.   Pulmonary:      Effort: Pulmonary effort is normal.      Breath sounds: Normal breath sounds.   Musculoskeletal:      Right lower leg: No edema.      Left lower leg: No edema.   Skin:     General: Skin is warm.   Neurological:      Mental Status: She is alert and oriented to person, place, and time.   Psychiatric:         Mood and Affect: Mood normal.         Behavior: Behavior normal.             Labs: Reviewed    Assessment & Plan:     33 y.o. female with the following -     1. LANDY (obstructive sleep apnea)  Patient diagnosed with sleep apnea, stable  Continue nightly CPAP use  Continue following up with sleep medicine    2. Intractable migraine without status migrainosus, unspecified migraine type  Chronic, stable  Plan  Consider supplementation  - magnesium: 400-600 mg once   - riboflavin (vitamin B2): 400 mg once daily  - coenzyme Q10: 300 mg daily  - get 7-9 hours of sleep per night; can try supplementing melatonin 2-10 mg, 2-3 hours before bedtime  - drink plenty of fluids (urine should be nearly clear)  - avoid excessive caffeine intake (no more than 2 servings per day and nothing in the afternoon)  - eat regular meals (don't skip meals)  - get moderate exercise (even just a 20 minute walk daily)    Prescription for Imitrex 50 mg once a day as needed  - SUMAtriptan (IMITREX) 50 MG Tab; Take 1 Tablet by mouth one time as " needed for Migraine for up to 1 dose.  Dispense: 10 Tablet; Refill: 3    3. Anxiety  Chronic, stable  Plan  Patient requesting refill for Atarax to be used as needed  - hydrOXYzine HCl (ATARAX) 25 MG Tab; Take 1 Tablet by mouth 1 time a day as needed for Anxiety.  Dispense: 30 Tablet; Refill: 0    4. B12 deficiency  Chronic, stable  Patient continues to have mild fatigue symptoms  Recommending vitamin B12 injection.  Continue oral vitamin B12 supplements  - cyanocobalamin (Vitamin B-12) injection 1,000 mcg      Follow-up in 3 months    Please note that this dictation was created using voice recognition software. I have made every reasonable attempt to correct obvious errors, but I expect that there are errors of grammar and possibly content that I did not discover before finalizing the note.

## 2024-08-13 NOTE — TELEPHONE ENCOUNTER
Called pt to remind them of their appointment and labs that will need to be completed prior. Pt confirmed

## 2024-08-16 ENCOUNTER — OFFICE VISIT (OUTPATIENT)
Dept: SLEEP MEDICINE | Facility: MEDICAL CENTER | Age: 34
End: 2024-08-16
Attending: STUDENT IN AN ORGANIZED HEALTH CARE EDUCATION/TRAINING PROGRAM
Payer: COMMERCIAL

## 2024-08-16 VITALS
BODY MASS INDEX: 32.02 KG/M2 | HEIGHT: 61 IN | RESPIRATION RATE: 16 BRPM | SYSTOLIC BLOOD PRESSURE: 120 MMHG | WEIGHT: 169.6 LBS | HEART RATE: 85 BPM | OXYGEN SATURATION: 94 % | DIASTOLIC BLOOD PRESSURE: 80 MMHG

## 2024-08-16 DIAGNOSIS — G47.33 OSA ON CPAP: ICD-10-CM

## 2024-08-16 PROCEDURE — 99212 OFFICE O/P EST SF 10 MIN: CPT | Performed by: PREVENTIVE MEDICINE

## 2024-08-16 PROCEDURE — 99204 OFFICE O/P NEW MOD 45 MIN: CPT | Performed by: PREVENTIVE MEDICINE

## 2024-08-16 PROCEDURE — 3074F SYST BP LT 130 MM HG: CPT | Performed by: PREVENTIVE MEDICINE

## 2024-08-16 PROCEDURE — 3079F DIAST BP 80-89 MM HG: CPT | Performed by: PREVENTIVE MEDICINE

## 2024-08-16 ASSESSMENT — FIBROSIS 4 INDEX: FIB4 SCORE: 0.48

## 2024-08-16 ASSESSMENT — ENCOUNTER SYMPTOMS: SLEEP DISTURBANCE: 1

## 2024-08-17 NOTE — PROGRESS NOTES
"Follow up Endocrinology Visit  Initial consult/last visit on: 9/7/23  Last visit on: 6/24/24  Referred by:  Nanci Lucero M.D.    Chief complaint:  Deisy Gilbert, is a very pleasant 32 y.o.female, who is here for follow up for Graves disease    Interval history:   - did a sleep study 2 mo ago => started on CPAP 1 mo ago -> causing worsening of eye dryness  - with diet and exercise was able to lose weight  - reports morning periorbital edema  - reviewed recent labs  Prior notes:  - since last visit feeling much better: tremor stopped, muscle weakness resolved, able to squat without any problem, currently using cane intermittently with back pains, which she associates with kidney stones  - diarrhea improved, but not resolved, patient asked for referral to GI specialist from PCP  - reports new right eye periorbital edema, gritty sensation, light sensitivity, ptosis, intermittent blurry vision, scheduled with neuro-ophthalmologist on 10/17/2023  - plans for surgical kidney stones removal on 10/18/2023  - reviewed with the patient new labs and thyroid US  11/09/23  - overall doing much better, however still feels very tired,  despite sleeping well  - gained couple lb since last visit  - still has diarrhea, even it much improved since initiation Rx with MMI; plans for GI evaluation  - had surgery for kidney stone removal, unfortunately, kidney stone was no evaluated for composition  - still has R eye ptosis and tearing, myasthenia gravis was ruled out  - followed by neuro-ophthalmologist, for now recommended active surveillance  - recent labs were reviewed  2/09/24  - patient developed disturbing knee pains, noted elevated MEGHAN, was seen by rheumatologist, there is a concern of possible MMI-induced SLE  - patient continues to feel fatigue  - eye \"are getting better\", she still feels gritty  sensation, uses artifical tears, denies blurry/double vision   4/12/24  - patient felt better after increasing dose of MMI, but " "later on feeling more fatigued, reports \"brain fog\"  - continues to have some L knee pain, follows rheumatologist who does not believe patient has MMI induced SLE  - noted blurry vision  6/24/24  - overall doing well  - eye symptoms improved but still sometimes has intermittent swelling; joint pain improved  - she also reports fatigue, not able to do hikes she was doing prior Dx of Graves disease  - she recently had 2 ED visits - 1st with palpitations, SBP up to 220 mmHg, second with migraine and elevated BP, she admits having panic attacks 15 years ago but she does not believe it was it  - she was measuring her BP at home, and it was < 140/85 mmHg  - her PCP is on vacation, she was able to schedule follow up appointment only in 9/2024  - reviewed recent labs    Hyperthyroidism HPI:  - was feeling tired for many months  - she lost 11 lb but associated with recent loss of her friend  - since beginning of 8/2023 patient noted nausea, diarrhea (having up to 4 BMS/day, watery stools), later on she noted palpitations  - she also noted worsening of her anxiety, reports  insomnia, sweating/hot flashes, heaviness on her chest, hand tremors, muscle weakness to the point that she needs to use cane to walk - persistent  - on labs from 8/23/23 - biochemical evidence of hyperthyroidism + elevated TrAbs  - was started on MMI 30 mg/day + propranolol 10 mg TID - no improvement of symptoms so far  - nonsmoker  - Fhx - thyroid disease in her aunt, not sure about the diagnosis  ALEJANDRO symptoms:  Dry, gritty eye  sensation - in R eye  Light sensitivity - in R eye  Tearing - no  Red eye - no  Proptosis -  R eye  Pain or pressure behind the eye - pressure behind R eye  Retroorbital pain - no  Pain with eye movement  - no   Eyelid retraction -  no  Eyelid swelling - R eye  Discomfort or pain in or behind the eye with looking L/R or up/down - no  Double vision - no  Blurry vision - intermittently  Color vision loss - no  Vision loss - " "no    Medications:  Current Outpatient Medications:     Cyanocobalamin (VITAMIN B 12 PO), , Disp: , Rfl:     propranolol (INDERAL) 10 MG Tab, Take 1 Tablet by mouth 3 times a day., Disp: , Rfl:     SUMAtriptan (IMITREX) 50 MG Tab, Take 1 Tablet by mouth one time as needed for Migraine for up to 1 dose., Disp: 10 Tablet, Rfl: 3    hydrOXYzine HCl (ATARAX) 25 MG Tab, Take 1 Tablet by mouth 1 time a day as needed for Anxiety., Disp: 30 Tablet, Rfl: 0    Magnesium 200 MG Tab, Take 200 mg by mouth every day., Disp: , Rfl:     diclofenac sodium (VOLTAREN) 1 % Gel, Apply 2 g topically 4 times a day as needed (back pain)., Disp: 50 g, Rfl: 0    ciclopirox (PENLAC) 8 % solution, Apply evenly over entire nail plate nightly to all affected nails., Disp: 6 mL, Rfl: 3    Vitamin D, Cholecalciferol, (CHOLECALCIFEROL) 25 MCG (1000 UT) Tab, Take 1,000 Units by mouth every day., Disp: , Rfl:     methimazole (TAPAZOLE) 10 MG Tab, Take 1.5 Tablets by mouth every day., Disp: 140 Tablet, Rfl: 4    norgestrel-ethinyl estradiol (LOW-OGESTREL) 0.3-30 MG-MCG Tab, Take 1 Tablet by mouth every day., Disp: 84 Tablet, Rfl: 4    Potassium Citrate 15 MEQ (1620 MG) Tab CR, , Disp: , Rfl:     Physical Examination:   Vital signs: /72   Pulse 77   Ht 1.549 m (5' 1\") Comment: pt stated  Wt 78 kg (171 lb 14.4 oz)   LMP 08/04/2024   SpO2 98%   BMI 32.48 kg/m²   General: No distress, cooperative, well dressed and well nourished.   Resp: Normal effort.  CVS: Regular rate and rhythm.  Extremities: No edema bilateral extremities  Neuro: Alert and oriented.   Skin: No rash, No Ulcers  Psych: Normal mood and affect    Labs:   Reference Range  08/22/23 08/23/23 09/29/23 11/06/23 02/05/24 02/07/24 04/06/24 04/26/24 06/04/24 06/17/24  8/9/24   TSH 0.380 - 5.330 uIU/mL <0.005 (L) <0.005 (L) <0.005 (L) <0.005 (L) <0.005 (L)  3.180 3.250 3.760 3.090 4.180   fT4 0.93 - 1.70 ng/dL 5.20 (H) 5.87 (H) 0.98 1.56 1.22  0.90 (L) 0.94  1.11 1.11   T3 " 60.0 - 181.0 ng/dL   165.0    149.0       fT3 2.00 - 4.40 pg/mL  19.90 (H)            TPO- Abs 0.0 - 9.0 IU/mL      246.0 (H)        Tg- Abs  0.0 - 4.0 IU/mL      1.0        TrAbs <=1.75 IU/L  9.72 (H)            MMI mg  30 10 10 10 15 15 10 10 10 10     Pheochromocytoma work up:   Reference Range  08/09/24   Metanephrine Plasma 0.00 - 0.49 nmol/L <0.10   Normetanephrine Plasma 0.00 - 0.89 nmol/L 0.27     Primary hyperaldosteronism work up:   Reference Range 08/09/24   Potassium 3.6 - 5.5 mmol/L 4.2   Aldos Serum ng/dL 18.2   Renin Activity ng/mL/hr 2.1     Adrenal insufficiency work up:   Reference Range 08/09/24    Cortisol 0.0 - 23.0 ug/dL 20.3       Imaging:  - reviewed  Thyroid US on 10/2/2023:  HISTORY/REASON FOR EXAM:  Hyperthyroidism/Graves disease, thyroid gland enlargement.  TECHNIQUE/EXAM DESCRIPTION:  Ultrasound of the soft tissues of the head and neck.  COMPARISON:  None  FINDINGS:  The thyroid gland is heterogeneous.  Vascularity is increased.  The right lobe of the thyroid gland measures 2.63 cm x 4.56 cm x 2.41 cm. The contour and echogenicity are normal. No focal mass lesions are identified.  The left lobe of the thyroid gland measures 2.08 cm x 4.21 cm x 1.73 cm. The contour and echogenicity are normal. No focal mass lesions are identified.  The isthmus measures 0.54 cm.  IMPRESSION:  1.  Mildly enlarged heterogeneous thyroid gland with hyperemia. Findings are consistent with Graves' disease.    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 - periodic  "hypokalemic paralysis and thyrotoxic periodic  paralysis are unlikely given persistent nature of muscle weakness vs myopathic attacks, already on propranolol - treatment for thyrotoxic periodic paralysis  - appropriately started on MMI and nonselective beta-blocker  - we discussed cause and natural history of the Graves disease, treatment approaches  - I explained that it takes up to 3-4 weeks to reach euthyroidism  - clinical and biochemical improvement of hyperthyroidism with normalization of free T4, TSH is still suppressed but likely is lagging in response  - we will decrease MMI to maintenance dose of 10 mg a day  - patient continues to have diarrhea, unlikely related to hyperthyroidism, celiac disease work-up was negative, agree with PCP with GI referral  - new symptoms consistent with right thyroid eye disease, referred to neuro-ophthalmologist  2/09/23  - on maintenance dose of MMI, fT4 wnl, TSH is still suppressed - likely indicates that patient needs higher dose of MMI  - concerns of MMI- induced SLE, follows rheumatologist  - had extensive discussion of possible approaches, there is a high risk of having SLE even with transition to PTU - will discuss with rheumatologist if that is reasonable, if neither of thionamides could be used -> consider surgical therapy, as GIRON might exacerbate ALEJANDRO  - discussed consequences of thyroidectomy and principles of replacement therapy   - patient will continue evaluation by rheumatology, will update me about plans, if transition to PTU is reasonable, if continues to take MMI -> consider increase the dose to 15 mg/day.   - ALEJANDRO improved  4/12/24  - improvement of symptoms after increasing dose of MMI  during the last visit, but then feels more fatigued, \"brain fogged\"  - on labs iatrogenic hypothyroidism  - will taper down MMI 15 -> 10 mg  - repeat fT4 in 2 weeks, TSH in 2 mo  - patient noted worsening of ALEJANDRO symptoms, eye exam unremarkable but patient reports blurry vision " with down gaze, plans to see neuroophthalmologist  6/24/24  - euthyroid on MMI 10 mg/day  - recent palpitations, BP elevation could not be explained by thyroid issues    Plan:  Continue MMI  10 mg/day  Repeat TFTs. TSI/TrAb in 4 mo.   Follow neuro-ophthalmologist recommendations.  Meanwhile, use sunglasses, artificial teardrops, avoid primary and secondary smoking    # Palpitations  # Elevated BP  - no evidence of pheochromocytoma, hyperaldosteronism, euthyroid  - further work up as per cardiology  Prior notes:  6/24/24  - patient does not believe it was a panic attack, denies dehydration  - given young age of the patient it is reasonable to rule out pheochromocytoma, hyperaldosteronism  Plan:  No endocrine pathology that could explain symptoms.   Further work up as per cardiology    # Chronic fatigue  - could be related to LANDY, started on CPAP but does not like the machine - feels uncomfortable  - no evidence of adrenal insufficiency  Prior notes:  6/24/24  - could be multifactorial  - unlikely to be explained by thyroid issues as she is currently euthyroid  - will rule out adrenal insufficiency, as pt is at higher risk of other autoimmune diseases, however, unlikely, given recent BP elevation  Plan:  Adrenal insufficiency was ruled out.   Further work up as per PCP.     # LANDY, started on CPAP  - new diagnosis  - could be contributory to the fatigue  - continue management as per sleep medicine    RTC: 4 mo  Total time (face-to-face and non-face-to face time):  30 min  Plan reviewed with the patient and agreed with plan.  All questions answered to patient's satisfaction.  Thank you kindly for allowing me to participate in the care plan for this patient.    Kamilah Danielson MD    CC:   Nanci Lucero M.D.

## 2024-08-20 ENCOUNTER — OFFICE VISIT (OUTPATIENT)
Dept: URGENT CARE | Facility: CLINIC | Age: 34
End: 2024-08-20
Payer: COMMERCIAL

## 2024-08-20 VITALS
TEMPERATURE: 97.6 F | HEART RATE: 82 BPM | BODY MASS INDEX: 32.28 KG/M2 | OXYGEN SATURATION: 97 % | SYSTOLIC BLOOD PRESSURE: 114 MMHG | RESPIRATION RATE: 14 BRPM | DIASTOLIC BLOOD PRESSURE: 76 MMHG | HEIGHT: 61 IN | WEIGHT: 171 LBS

## 2024-08-20 DIAGNOSIS — R31.9 HEMATURIA, UNSPECIFIED TYPE: ICD-10-CM

## 2024-08-20 DIAGNOSIS — S39.012A STRAIN OF LUMBAR REGION, INITIAL ENCOUNTER: Primary | ICD-10-CM

## 2024-08-20 LAB
APPEARANCE UR: CLEAR
BILIRUB UR STRIP-MCNC: NEGATIVE MG/DL
COLOR UR AUTO: YELLOW
GLUCOSE UR STRIP.AUTO-MCNC: NEGATIVE MG/DL
KETONES UR STRIP.AUTO-MCNC: NEGATIVE MG/DL
LEUKOCYTE ESTERASE UR QL STRIP.AUTO: NORMAL
NITRITE UR QL STRIP.AUTO: NEGATIVE
PH UR STRIP.AUTO: 6 [PH] (ref 5–8)
PROT UR QL STRIP: NEGATIVE MG/DL
RBC UR QL AUTO: NORMAL
SP GR UR STRIP.AUTO: 1.02
UROBILINOGEN UR STRIP-MCNC: 0.2 MG/DL

## 2024-08-20 PROCEDURE — 81002 URINALYSIS NONAUTO W/O SCOPE: CPT | Performed by: PHYSICIAN ASSISTANT

## 2024-08-20 PROCEDURE — 99213 OFFICE O/P EST LOW 20 MIN: CPT | Mod: 25 | Performed by: PHYSICIAN ASSISTANT

## 2024-08-20 RX ORDER — KETOROLAC TROMETHAMINE 15 MG/ML
15 INJECTION, SOLUTION INTRAMUSCULAR; INTRAVENOUS ONCE
Status: DISCONTINUED | OUTPATIENT
Start: 2024-08-20 | End: 2024-08-20

## 2024-08-20 RX ORDER — KETOROLAC TROMETHAMINE 30 MG/ML
15 INJECTION, SOLUTION INTRAMUSCULAR; INTRAVENOUS ONCE
Status: COMPLETED | OUTPATIENT
Start: 2024-08-20 | End: 2024-08-20

## 2024-08-20 RX ADMIN — KETOROLAC TROMETHAMINE 15 MG: 30 INJECTION, SOLUTION INTRAMUSCULAR; INTRAVENOUS at 08:48

## 2024-08-20 ASSESSMENT — FIBROSIS 4 INDEX: FIB4 SCORE: 0.48

## 2024-08-20 NOTE — PROGRESS NOTES
"Subjective:   Deisy Gilbert is a 33 y.o. female who presents for Back Pain (Lower back pain on and off x 2 weeks , yesterday bent down and heard a crack and now pain is at 10 , took advil and a cream  that was prescribed prior but little to no relief )      HPI  The patient presents to the Urgent Care with complaints of low back pain onset yesterday.  She was bending over to grab something from her drawer when she heard a crack to her low back and immediate pain.  Pain primarily to the left lower back. Pain kept her up last night. Pain is worse with bending.  Positive radiation pain to her right thigh down to her knee.  Did experience some tingling last night.  No numbness or noticeable weakness.  Denies any saddle anesthesia.  Denies any loss of bowel or bladder control.  She did hurt her back similarly about a month ago which recovered about 2 weeks ago.  She has tried ibuprofen 200 mg and diclofenac cream without much relief. Denies any urinary symptoms such as dysuria or urinary frequency. History of kidney stones. No other complaints or concerns.        Past Medical History:   Diagnosis Date    Abdominal pain     Back pain     Back pain     Blood in urine     Daytime sleepiness     Depression     Diarrhea     Fatigue     Frequent headaches     Gasping for breath     Graves disease 08/18/2023    Heartburn     History of kidney stones     Hyperlipidemia     Hypertension     Hyperthyroidism     Insomnia     Irregular periods     Morning headache     Nasal drainage     Nausea     Palpitations     Sleep apnea     Sweat, sweating, excessive     Weakness     Wears glasses     Weight loss      Allergies   Allergen Reactions    Lactose      Other reaction(s): GI Intolerance        Objective:     /76 (BP Location: Left arm, Patient Position: Sitting, BP Cuff Size: Adult)   Pulse 82   Temp 36.4 °C (97.6 °F) (Temporal)   Resp 14   Ht 1.549 m (5' 1\")   Wt 77.6 kg (171 lb)   LMP 08/04/2024   SpO2 97%   BMI " 32.31 kg/m²     Physical Exam  Vitals reviewed.   Constitutional:       General: She is not in acute distress.     Appearance: Normal appearance. She is not ill-appearing or toxic-appearing.   Eyes:      Conjunctiva/sclera: Conjunctivae normal.   Cardiovascular:      Rate and Rhythm: Normal rate.   Pulmonary:      Effort: Pulmonary effort is normal.   Musculoskeletal:      Cervical back: Neck supple. No rigidity.      Comments: Back: Full range of flexion, extension, rotation, lateralization. Pain provoked with flexion.   Tenderness to palpation to left lumbar paraspinal muscle.   Negative midline tenderness, bony tenderness, crepitus, deformities, or step-offs.  Negative straight leg raise           Skin:     General: Skin is warm and dry.   Neurological:      General: No focal deficit present.      Mental Status: She is alert and oriented to person, place, and time.      Comments: Strength 5/5 bilateral lower extremities.   Normal gait    Psychiatric:         Mood and Affect: Mood normal.         Behavior: Behavior normal.         Diagnosis and associated orders:     1. Strain of lumbar region, initial encounter  - ketorolac (Toradol) injection 15 mg    2. Hematuria, unspecified type  - POCT Urinalysis  - URINE CULTURE(NEW); Future       Comments/MDM:     Discussed with patient signs and symptoms consistent with a back strain.   Toradol here in clinic. Patient monitored for 5 minutes and tolerated well.   Treatment of initial rest with no heavy lifting, stooping, or strenuous activity. Massage, ice and/or heat which ever feels better, and Ibuprofen per manufacture's instructions (not to be started until 12 hours from now). Encouraged walking, stretching, and range of motion exercises as tolerated. Avoid sitting or laying down for long periods of time except for at night during sleep.   Patient is overall very well-appearing, no acute distress, and normal vital signs. Suspicions for acute emergent pathology are  low.   Follow up with your PCP or return to urgent care if symptoms not improving in 1-2 weeks.     Due to UA results of positive leuks and hematuria, will further evaluate with urine culture. Patient otherwise denies any urinary symptoms.        I personally reviewed prior external notes and test results pertinent to today's visit. Pathogenesis of diagnosis discussed including typical length and natural progression. Supportive care, natural history, differential diagnoses, and indications for immediate follow-up discussed. Patient expresses understanding and agrees to plan. Patient denies any other questions or concerns.     Follow-up with the primary care physician for recheck, reevaluation, and consideration of further management.    Please note that this dictation was created using voice recognition software. I have made a reasonable attempt to correct obvious errors, but I expect that there are errors of grammar and possibly content that I did not discover before finalizing the note.    This note was electronically signed by Jay Salamanca PA-C

## 2024-08-21 ENCOUNTER — PATIENT MESSAGE (OUTPATIENT)
Dept: SLEEP MEDICINE | Facility: MEDICAL CENTER | Age: 34
End: 2024-08-21

## 2024-08-21 ENCOUNTER — OFFICE VISIT (OUTPATIENT)
Dept: ENDOCRINOLOGY | Facility: MEDICAL CENTER | Age: 34
End: 2024-08-21
Attending: STUDENT IN AN ORGANIZED HEALTH CARE EDUCATION/TRAINING PROGRAM
Payer: COMMERCIAL

## 2024-08-21 VITALS
HEART RATE: 77 BPM | OXYGEN SATURATION: 98 % | WEIGHT: 171.9 LBS | HEIGHT: 61 IN | BODY MASS INDEX: 32.45 KG/M2 | DIASTOLIC BLOOD PRESSURE: 72 MMHG | SYSTOLIC BLOOD PRESSURE: 114 MMHG

## 2024-08-21 DIAGNOSIS — G47.33 OSA ON CPAP: ICD-10-CM

## 2024-08-21 DIAGNOSIS — R00.2 PALPITATIONS: ICD-10-CM

## 2024-08-21 DIAGNOSIS — R53.82 CHRONIC FATIGUE: ICD-10-CM

## 2024-08-21 DIAGNOSIS — H57.89 THYROID EYE DISEASE: ICD-10-CM

## 2024-08-21 DIAGNOSIS — E05.00 GRAVES DISEASE: ICD-10-CM

## 2024-08-21 DIAGNOSIS — E07.9 THYROID EYE DISEASE: ICD-10-CM

## 2024-08-21 PROCEDURE — 3078F DIAST BP <80 MM HG: CPT | Performed by: STUDENT IN AN ORGANIZED HEALTH CARE EDUCATION/TRAINING PROGRAM

## 2024-08-21 PROCEDURE — 99214 OFFICE O/P EST MOD 30 MIN: CPT | Performed by: STUDENT IN AN ORGANIZED HEALTH CARE EDUCATION/TRAINING PROGRAM

## 2024-08-21 PROCEDURE — 99211 OFF/OP EST MAY X REQ PHY/QHP: CPT | Performed by: STUDENT IN AN ORGANIZED HEALTH CARE EDUCATION/TRAINING PROGRAM

## 2024-08-21 PROCEDURE — 3074F SYST BP LT 130 MM HG: CPT | Performed by: STUDENT IN AN ORGANIZED HEALTH CARE EDUCATION/TRAINING PROGRAM

## 2024-08-21 ASSESSMENT — FIBROSIS 4 INDEX: FIB4 SCORE: 0.48

## 2024-08-22 NOTE — PROGRESS NOTES
"CHIEF COMPLIANT: \"Establish care.\"  HISTORY OF PRESENT ILLNESS:  Deisy Gilbert is a 33 y.o. female kindly referred by Nanci Lucero M.D. and  is here for a sleep medicine consultation.     Sleep History:  Deisy Gilbert has been tested for sleep apnea. See below.  The patient reports snoring, witnessed apneas, poor quality sleep, and chronic fatigue.  The patient goes to bed at 11 pm and wakes up at varies am. It usually takes the patient approximately 30 mins to fall asleep. The patient does not feel refreshed after sleeping and does nap during the day. This patient was a social smoker.  Patient does not  use cannabis. This patient does not  drink  alcoholic beverages .    The patient denies any symptoms of narcolepsy such as sleep paralysis or cataplexy, or any symptoms that suggest parasomnias such as sleep walking or acting out of dreams.    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       Endorses  family members who use PAP therapy/snore. Mother, grandmother    EPWORTH SCORE:    9/24, this is borderline with EDS.       COMPLIANCE DATA:  > 4 hours at  7 %  Machine type: Airsense 11 Autoset  Date range: 7/16-8/14/2024  AHI: 0.8  TIME USED: 2 hrs. 17 mins  PRESSURE SETTINGS:  min 4, max 12 CWP  LEAK: minimal  DME:  iSLEEP       TYPE:  [x]HST   []In Lab split   []In Lab diagnostic   []In Lab titration  DATE: 5/31/2024  Diagnostic:AHI: 14, RDI 20  Diagnostic  Oxygen Nishant: 86% with 0.3 mins at or under 88%       Significant comorbidities and modifying factors: see below    PAST MEDICAL HISTORY:  Past Medical History:   Diagnosis Date    Abdominal pain     Back pain     Back pain     Blood in urine     Daytime sleepiness     Depression     Diarrhea     Fatigue     Frequent headaches     Gasping for breath     Graves disease 08/18/2023    Heartburn     History of kidney stones     Hyperlipidemia     Hypertension     Hyperthyroidism     Insomnia     Irregular periods     Morning headache     Nasal " drainage     Nausea     Palpitations     Sleep apnea     Sweat, sweating, excessive     Weakness     Wears glasses     Weight loss       PROBLEM LIST:  Patient Active Problem List    Diagnosis Date Noted    Hordeolum internum of left lower eyelid 2024    New onset headache 2024    Serologic autoimmunity (positive MEGHAN 1: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 homogenous) 2024    Arthralgia 2024    Thyroid orbitopathy 10/17/2023    Palpitations 2023    Graves disease 2023    Hyperthyroidism 2023    Diarrhea 2023    Anxiety 2023    Recurrent kidney stones 2023    H/O: suicide attempt 2023    Moderate episode of recurrent major depressive disorder (HCC) 2022    Onychomycosis 2017    Generalized anxiety disorder 2017    Acquired complex renal cyst 10/03/2016    Dysmenorrhea 2016     PAST SOCIAL HISTORY:  Past Surgical History:   Procedure Laterality Date    STENT PLACEMENT  10/2023    CYSTOSCOPY STENT PLACEMENT       PAST FAMILY HISTORY:  Family History   Problem Relation Age of Onset    Hypertension Mother     Eczema Father     No Known Problems Brother     Breast Cancer Paternal Aunt     Diabetes Paternal Uncle     Hypertension Maternal Grandmother     Breast Cancer Paternal Aunt      SOCIAL HISTORY:  Social History     Socioeconomic History    Marital status: Single     Spouse name: Not on file    Number of children: Not on file    Years of education: Not on file    Highest education level: Bachelor's degree (e.g., BA, AB, BS)   Occupational History    Not on file   Tobacco Use    Smoking status: Former     Current packs/day: 0.00     Types: Cigarettes     Start date: 2008     Quit date: 2013     Years since quittin.5    Smokeless tobacco: Never    Tobacco comments:     I did not smoke packs. I only smoked 2 cigarettes ever week or so.   Vaping Use    Vaping status: Never Used   Substance and Sexual Activity    Alcohol use: Not Currently    Drug  use: Never    Sexual activity: Not on file   Other Topics Concern    Not on file   Social History Narrative    Senior tech artist      Social Determinants of Health     Financial Resource Strain: Low Risk  (8/21/2023)    Overall Financial Resource Strain (CARDIA)     Difficulty of Paying Living Expenses: Not very hard   Food Insecurity: No Food Insecurity (8/21/2023)    Hunger Vital Sign     Worried About Running Out of Food in the Last Year: Never true     Ran Out of Food in the Last Year: Never true   Transportation Needs: No Transportation Needs (8/21/2023)    PRAPARE - Transportation     Lack of Transportation (Medical): No     Lack of Transportation (Non-Medical): No   Physical Activity: Insufficiently Active (8/21/2023)    Exercise Vital Sign     Days of Exercise per Week: 3 days     Minutes of Exercise per Session: 30 min   Stress: Stress Concern Present (8/21/2023)    [employer] [place] [employer] - Occupational Stress Questionnaire     Feeling of Stress : To some extent   Social Connections: Socially Isolated (8/21/2023)    Social Connection and Isolation Panel [NHANES]     Frequency of Communication with Friends and Family: More than three times a week     Frequency of Social Gatherings with Friends and Family: Patient declined     Attends Confucianism Services: Never     Active Member of Clubs or Organizations: No     Attends Club or Organization Meetings: Never     Marital Status: Never    Intimate Partner Violence: Not on file   Housing Stability: Low Risk  (8/21/2023)    Housing Stability Vital Sign     Unable to Pay for Housing in the Last Year: No     Number of Places Lived in the Last Year: 1     Unstable Housing in the Last Year: No     ALLERGIES: Lactose  MEDICATIONS:  Current Outpatient Medications   Medication Sig Dispense Refill    Cyanocobalamin (VITAMIN B 12 PO)       propranolol (INDERAL) 10 MG Tab Take 1 Tablet by mouth 3 times a day.      SUMAtriptan (IMITREX) 50 MG Tab Take  "1 Tablet by mouth one time as needed for Migraine for up to 1 dose. 10 Tablet 3    hydrOXYzine HCl (ATARAX) 25 MG Tab Take 1 Tablet by mouth 1 time a day as needed for Anxiety. (Patient not taking: Reported on 8/20/2024) 30 Tablet 0    Magnesium 200 MG Tab Take 200 mg by mouth every day.      diclofenac sodium (VOLTAREN) 1 % Gel Apply 2 g topically 4 times a day as needed (back pain). 50 g 0    ciclopirox (PENLAC) 8 % solution Apply evenly over entire nail plate nightly to all affected nails. 6 mL 3    Vitamin D, Cholecalciferol, (CHOLECALCIFEROL) 25 MCG (1000 UT) Tab Take 1,000 Units by mouth every day.      methimazole (TAPAZOLE) 10 MG Tab Take 1.5 Tablets by mouth every day. 140 Tablet 4    norgestrel-ethinyl estradiol (LOW-OGESTREL) 0.3-30 MG-MCG Tab Take 1 Tablet by mouth every day. 84 Tablet 4    Potassium Citrate 15 MEQ (1620 MG) Tab CR        No current facility-administered medications for this visit.    \"CURRENT RX\"    REVIEW OF SYSTEMS:  Constitutional: Denies weight loss, endorses chronic daytime fatigue --also see HPI    PHYSICAL EXAM/VITALS:  /80 (BP Location: Left arm, Patient Position: Sitting, BP Cuff Size: Adult)   Pulse 85   Resp 16   Ht 1.549 m (5' 1\")   Wt 76.9 kg (169 lb 9.6 oz)   LMP 08/04/2024   SpO2 94%   BMI 32.05 kg/m²   Neck circumference (inches): 16  Appearance: Well-nourished, well-developed,  looks stated age, no acute distress  Eyes:   EOMI  ENMT:   Hard palate narrow: No   Hard palate high: No   Soft palate/uvula (Mallampati score): 3-4  Tongue Scalloping: No   Retrognathia:  No   Micrognathia:  No    Neck: Supple, trachea midline  Respiratory effort:  No intercostal retractions or use of accessory muscles  Lung auscultation:  No wheezes rhonchi rubs or rales  Cardiac: No murmurs, rubs, or gallops; regular rhythm, normal rate; no edema  Musculoskeletal:  Grossly normal; gait and station normal  Neurologic:  oriented to person, time, place, and purpose; judgement " intact  Psychiatric:  No depression, anxiety, agitation    MEDICAL DECISION MAKING:  The medical record was reviewed as it pertains to this referral. This includes records from primary care,consultants notes, referral request, hospital records, labs and imaging. Any available diagnostic and titration nocturnal polysomnograms, home sleep apnea tests, continuous nocturnal oximetry results, multiple sleep latency tests, and recent compliance reports were reviewed with the patient.    ASSESSMENT/PLAN:  Deisy Gilbert is a 33 y.o. female  who has been diagnosed with obstructive sleep apnea.        DIAGNOSES :    1. LANDY on CPAP  - DME Mask and Supplies    The patient has signs and symptoms consistent with obstructive sleep apnea hypopnea syndrome. Will schedule a nocturnal polysomnogram or a home sleep apnea test (please see plan).  The risks of untreated sleep apnea were discussed with the patient at length. Patients with LANDY are at increased risk of cardiovascular disease including coronary artery disease, systemic arterial hypertension, pulmonary arterial hypertension, cardiac arrhythmias, and stroke. LANDY patients have an increased risk of motor vehicle accidents, type 2 diabetes, chronic kidney disease, and non-alcoholic liver disease. The patient was advised to avoid driving a motor vehicle when drowsy.  Have advised the patient to follow up with the appropriate healthcare practitioners for all other medical problems and issues.    RETURN TO CLINIC: Return in about 6 weeks (around 9/27/2024) for Compliance check.    My total time spent caring for the patient on the day of the encounter was 40 minutes. This includes time spent on a thorough chart review including other physician notes, all sleep studies, as well as critical labs and pulmonary and cardiac studies.  Additionally, it includes a thorough discussion of good sleep hygiene and stimulus control, as well as  the need for consistency in terms of sleep  preparation and practice.    Please note that this dictation was created using voice recognition software.  I have made every reasonable attempt to correct obvious errors, I expect that there are errors of grammar and possibly content that I did not discover before finalizing this note.                        Answers submitted by the patient for this visit:  Sleep Center Questionnaire (Submitted on 8/16/2024)  Year of your last physical exam: 2024  Occupation : Senior   Height: 5'1  Current weight: 171  What is the reason for your visit today?: Was diagnosed with Sleep Apnea. Started using the CPAP machine, but I hate it.  Name of person referring you to the Sleep Center: Dr Nanci Lucero  Reason, year, and hospital in which you were hospitalized:: Heart Palpitations, 2024, [facility]  Have you ever had problems with anesthesia?: No  Have you experienced post-operative delirium?: No  Any complications with surgery?: No  What year did you receive your last Flu shot?: 2023  What year did you receive you last Pneumonia shot?: NA  Have you had a TB skin test? If so, please list the year and result:: No  Have you had Allergy skin testing? If so, please list the year and result:: No  Please briefly describe your sleep problem and how old you were when it began.: 33 years old; Woke up with headaches in the morning and feel exhausted by 12pm.  How does this affect your daily life and activities? Please also rate how serious of a problem this is (1 = Not at all, 10 = Very Serious).: I feel exhausted to finish homework, cook for myself, etc. I force myself to do it anyway; Score: 6-7  Have you had any previous evaluations, examinations, or treatment for this sleep problem or any other problems with your sleep? If so, please describe the evaluation, treatment, and results.: No, this is the first time I am seeing someone for treatment.  Have you used any medications (prescribed or otherwise) to help your sleep  problem? If yes, include name, amount, frequency, and the prescribing physician.: I was prescribe Hydroxyzine 25MG to take for only a couple of days to try again with the CPAP machine. I dislike using it. Interrupts my sleep.  If employed, what time do you usually start and end work?: 9:30am to 5:00pm  Do you ever change work shifts? If yes, describe how often (never, infrequently, regularly).: infrequently. If its crunch time, I will work till about 8pm at night.  What time do you usually go to bed and wake up on: Weekdays? Weekends?: Varies from day to day. Usually I'm in bed by 11:00pm. Tuesdays, I'm in bed by midnight because of school. I am trying to head to bed earlier by 10pm.  Do you have a regular bed partner?: No  How many minutes does it usually take to fall asleep at night after turning off the lights?: 30 min. Varies how exhausted I am or how much I spend time on the computer  What do you ordinarily do just prior to turning out the lights and attempting to go to sleep (e.g., reading, TV, baths, etc.)?: I'm at my computer working on homework or on my phone.  On average, how many times do you wake up during the night?: With the CPAP machine, I wake up twice. Without the machine I sleep through. However, if its hot in the room, that will wake me up.  On average, how many times do you wake up to use the bathroom?: once. Doesn't happen often. I stop drinking liquids 2-3 hours prior to sleep.  Do you often wake up too early in the morning and are unable to return to sleep?: No  On average, how many hours of sleep do you get per night?: 7  How do you usually awaken?  Alarm, spontaneously, or other?: Groggy. Takes me a minute to adjust then I am up and about.  Is it difficult for you to awaken and get out of bed after sleeping? (Not at all, Sometimes, Very): Sometimes  Do you nap or return to bed after arising?: I do nap around noon to 1pm. I usually go back to bed after waking up to feed my cats during the  weekends.  Are you bothered by sleepiness during the day?: Sometimes.  Do you feel that you get too much sleep at night?: No  Do you feel that you get too little sleep at night?: Yes  Do you usually feel tired during the day? If so, what do you attribute this to?: Yes, I think it is a combination of school, work stresses and introvert personality. Along with my diagnoses of Graves Disease. My body tires out faster, I think.  Do you find yourself falling asleep when you don't mean to? : Use to happen at work, but after starting on Vitamin D and B12, I don't encounter that issue.  If yes, how long does your sleep episode last?: 15 minutes  Do you feel rested or refreshed after the sleep episode?: No  Have you ever suddenly fallen?: Not that I can recall.  Have you ever experienced sudden body weakness?: Yes  If yes, were you aware of the things around you?: Yes  Was the weakness brought on by any particular event or feeling? If so, briefly describe.: I cannot recall. Usually happens on thursdays. On thursdays I go into the office. While the rest of the week I work from home.  Have you ever experienced weakness or paralysis upon going to sleep?: No  Have you ever experienced weakness or paralysis upon awakening from sleep?: I don't believe so.  Have you ever experienced seeing things or hearing voices/noises: That weren't real? On going to sleep? During the night? On awakening from sleep? During the day?: Yes, that was in 2022. When I was weaning off from my Anti-Depressant meds.  Do you have difficulty breathing at night? If yes, briefly describe.: When I was in my 20s, I did experience difficulty of breathing at night.  How many times per week?: It didn't occur on a weekly basis. It occurred on a monthly basis.  How did you become aware of this, at what age did this first occur, and how many years has this occurred?: 18 years old, then it stopped in my late 20s.  Have you been told you snore while asleep? If so, does  "it disturb a bed partner (or someone in the same room), or someone in the next room?: Yes, but It doesn't happen all the time. The last time I was told that was a year ago.  Have you ever experienced doing something without being aware of the action? If yes, please describe.: No  Have you ever experienced upon lying in bed before sleep or on awakening from sleep: Restlessness of legs, \"nervous legs\", \"creeping crawling\" sensation of legs, or twitching of legs?: Yes  How many times per week does this occur, and how many minutes does the sensation last?: Did not occur on a weekly basis. Usually the sensation would feel like it lasted more than 30 minutes. Usually this is triggered, when I am not active.  Does anything relieve the sensations (e.g., getting out of bed, medication, massage)?: No  At what age did this first occur, and how many years has this occurred?: 30  Have you ever been told that your arms or legs jerk or twitch while you are asleep? If yes, how many times per night does this occur?: I felt myself jerk when I was trying to fall asleep with the CPAP machine for the first 2 nights. No one has told me if my arms or legs jerk.  At what age did this first occur, and how many years has this occurred?: 34.  Does this seem to awaken you from your sleep?: Yes  Do you know, or have you ever been told that you do any of the following while sleeping: talk, walk, grit teeth, wet the bed, wake up screaming or seemingly afraid, have disturbing dreams, have unusual movements, wake up with headaches, (males) have erections? If yes to any of these, please indicate how many times per week, age started, last occurrence, treatment received.: Yes. Talk in my sleep: as young as I can remember till now at the age of 33; Walking in my sleep: 10 years old. No treatment, but that was the last event occured. gritting my teeth: 20s to now age 33. I wear a mouth guard when I sleep.  Has anyone in your family been known to have " any sleep problems? If yes, please list the type of problem (e.g., trouble getting to sleep, too sleepy, bed wetting, etc.), the relationship of this person to you, and the treatment received.: Yes, my mother, father and grandmother on my mothers side. No treatments received.

## 2024-08-26 ENCOUNTER — PATIENT MESSAGE (OUTPATIENT)
Dept: MEDICAL GROUP | Facility: MEDICAL CENTER | Age: 34
End: 2024-08-26
Payer: COMMERCIAL

## 2024-08-29 ENCOUNTER — RESEARCH ENCOUNTER (OUTPATIENT)
Dept: RESEARCH | Facility: MEDICAL CENTER | Age: 34
End: 2024-08-29
Payer: COMMERCIAL

## 2024-08-29 NOTE — RESEARCH NOTE
Patient has been referred by Nanci Lucero M.D. Sent initial referral follow-up message with instructions to locate and sign consent form(s). The following consent form(s) have been pushed to the patient's MyChart: CHARLES

## 2024-09-03 ENCOUNTER — OFFICE VISIT (OUTPATIENT)
Dept: URGENT CARE | Facility: CLINIC | Age: 34
End: 2024-09-03
Payer: COMMERCIAL

## 2024-09-03 ENCOUNTER — APPOINTMENT (OUTPATIENT)
Dept: URGENT CARE | Facility: CLINIC | Age: 34
End: 2024-09-03
Payer: COMMERCIAL

## 2024-09-03 VITALS
HEART RATE: 54 BPM | DIASTOLIC BLOOD PRESSURE: 96 MMHG | WEIGHT: 171 LBS | RESPIRATION RATE: 16 BRPM | HEIGHT: 61 IN | BODY MASS INDEX: 32.28 KG/M2 | TEMPERATURE: 97.2 F | OXYGEN SATURATION: 96 % | SYSTOLIC BLOOD PRESSURE: 130 MMHG

## 2024-09-03 DIAGNOSIS — R09.81 NASAL CONGESTION: ICD-10-CM

## 2024-09-03 DIAGNOSIS — J02.9 PHARYNGITIS, UNSPECIFIED ETIOLOGY: ICD-10-CM

## 2024-09-03 DIAGNOSIS — M62.830 LUMBAR PARASPINAL MUSCLE SPASM: ICD-10-CM

## 2024-09-03 LAB — S PYO DNA SPEC NAA+PROBE: NOT DETECTED

## 2024-09-03 PROCEDURE — 99213 OFFICE O/P EST LOW 20 MIN: CPT | Performed by: PHYSICIAN ASSISTANT

## 2024-09-03 PROCEDURE — 87651 STREP A DNA AMP PROBE: CPT | Performed by: PHYSICIAN ASSISTANT

## 2024-09-03 PROCEDURE — 3075F SYST BP GE 130 - 139MM HG: CPT | Performed by: PHYSICIAN ASSISTANT

## 2024-09-03 PROCEDURE — 3080F DIAST BP >= 90 MM HG: CPT | Performed by: PHYSICIAN ASSISTANT

## 2024-09-03 RX ORDER — KETOROLAC TROMETHAMINE 30 MG/ML
15 INJECTION, SOLUTION INTRAMUSCULAR; INTRAVENOUS ONCE
Status: COMPLETED | OUTPATIENT
Start: 2024-09-03 | End: 2024-09-03

## 2024-09-03 RX ORDER — METHYLPREDNISOLONE 4 MG
TABLET, DOSE PACK ORAL
Qty: 21 TABLET | Refills: 0 | Status: SHIPPED | OUTPATIENT
Start: 2024-09-03

## 2024-09-03 RX ORDER — KETOROLAC TROMETHAMINE 15 MG/ML
15 INJECTION, SOLUTION INTRAMUSCULAR; INTRAVENOUS ONCE
Status: DISCONTINUED | OUTPATIENT
Start: 2024-09-03 | End: 2024-09-03

## 2024-09-03 RX ADMIN — KETOROLAC TROMETHAMINE 15 MG: 30 INJECTION, SOLUTION INTRAMUSCULAR; INTRAVENOUS at 13:41

## 2024-09-03 ASSESSMENT — ENCOUNTER SYMPTOMS
SORE THROAT: 1
SPUTUM PRODUCTION: 0
WEAKNESS: 0
COUGH: 1
BACK PAIN: 1
DIARRHEA: 0
VOMITING: 0
ABDOMINAL PAIN: 0
TINGLING: 0
FEVER: 1
NAUSEA: 0
SPEECH CHANGE: 0
SHORTNESS OF BREATH: 0
WHEEZING: 0

## 2024-09-03 ASSESSMENT — FIBROSIS 4 INDEX: FIB4 SCORE: 0.48

## 2024-09-03 NOTE — LETTER
[facility]  [address]  [place] [address]     September 3, 2024    Patient: Deisy Gilbert   YOB: 1990   Date of Visit: 9/3/2024       To Whom It May Concern:    Deisy Gilbert was seen and treated in our department on 9/3/2024. She should be excused from missed work for today and tomorrow.    Sincerely,     Tushar Gaston P.A.-C.

## 2024-09-03 NOTE — PROGRESS NOTES
Subjective:   Deisy Gilbert  is a 33 y.o. female who presents for Nasal Drainage (X 1 day, lower back pain x 1 day)      Back Pain  This is a new problem. The current episode started yesterday. Associated symptoms include a fever. Pertinent negatives include no abdominal pain, dysuria, tingling or weakness.   Patient presents urgent care noting onset of symptoms of low back pain as well as upper respiratory infection this morning.  She notes a fever this morning with a max temp of 102.  She complains of cough but more bothersome and sore throat.  She denies ear pain.  Complains of nasal congestion.  She denies vomiting abdominal pain or diarrhea.  She denies urinary symptoms of dysuria or hematuria.  She she denies flank pain.    She has a second complaint of low back pain.  She states she was seen for this around 1 to 2 weeks ago.  She has had increased issues with her back since she started a weight training program.  This morning she was bent over giving a urine sample (for her urologist workup) when she felt her back pop.  Notes pain in the low back across waistline left greater than right.  She has numbness tingling or weakness.  Denies saddle anesthesia or incontinence.  Denies history of back surgeries.    Review of Systems   Constitutional:  Positive for fever.   HENT:  Positive for congestion and sore throat. Negative for ear pain.    Respiratory:  Positive for cough. Negative for sputum production, shortness of breath and wheezing.    Gastrointestinal:  Negative for abdominal pain, diarrhea, nausea and vomiting.   Genitourinary:  Negative for dysuria, frequency and hematuria.   Musculoskeletal:  Positive for back pain.   Skin:  Negative for rash.   Neurological:  Negative for tingling, speech change and weakness.       Allergies   Allergen Reactions    Lactose      Other reaction(s): GI Intolerance        Objective:   BP (!) 130/96 (BP Location: Left arm, Patient Position: Sitting, BP Cuff Size: Adult)   " Pulse (!) 54   Temp 36.2 °C (97.2 °F) (Temporal)   Resp 16   Ht 1.549 m (5' 1\")   Wt 77.6 kg (171 lb)   LMP 08/04/2024   SpO2 96%   BMI 32.31 kg/m²     Physical Exam  Vitals and nursing note reviewed.   Constitutional:       General: She is not in acute distress.     Appearance: She is well-developed. She is not diaphoretic.   HENT:      Head: Normocephalic and atraumatic.      Right Ear: Tympanic membrane, ear canal and external ear normal.      Left Ear: Tympanic membrane, ear canal and external ear normal.      Nose: Nose normal.      Mouth/Throat:      Mouth: Mucous membranes are moist.      Pharynx: Uvula midline. Posterior oropharyngeal erythema ( mild PND) present. No oropharyngeal exudate.      Tonsils: No tonsillar abscesses.   Eyes:      General: Lids are normal. No scleral icterus.        Right eye: No discharge.         Left eye: No discharge.      Conjunctiva/sclera: Conjunctivae normal.   Pulmonary:      Effort: Pulmonary effort is normal. No respiratory distress.      Breath sounds: Normal breath sounds. No stridor. No decreased breath sounds, wheezing, rhonchi or rales.   Musculoskeletal:         General: Normal range of motion.      Cervical back: Neck supple.      Lumbar back: Spasms and tenderness ( primary paraspinous) present. No swelling, edema, deformity, signs of trauma, lacerations or bony tenderness. Negative right straight leg raise test and negative left straight leg raise test.   Skin:     General: Skin is warm and dry.      Coloration: Skin is not pale.      Findings: No erythema.   Neurological:      Mental Status: She is alert and oriented to person, place, and time. She is not disoriented.      Sensory: No sensory deficit.      Coordination: Coordination normal.      Deep Tendon Reflexes: Reflexes are normal and symmetric.      Comments: SLR normal bilat   Psychiatric:         Speech: Speech normal.         Behavior: Behavior normal.     Toradol 15mg IM - tolerates " well  POCT STREP - NEGATIVE    Assessment/Plan:   1. Nasal congestion  - methylPREDNISolone (MEDROL DOSEPAK) 4 MG Tablet Therapy Pack; Follow schedule on package instructions.  Dispense: 21 Tablet; Refill: 0    2. Lumbar paraspinal muscle spasm  - ketorolac (Toradol) injection 15 mg    3. Pharyngitis, unspecified etiology  - POCT CEPHEID GROUP A STREP - PCR  Supportive care is reviewed with patient/caregiver - recommend to push PO fluids and electrolytes, Cautioned regarding potential side effects of steroid, avoid nsaids while using  Recommend heat and mobility.  Patient is sent with recommended stretching and strengthening exercises for muscular back strain.  Corticosteroid should help both with respiratory symptoms as well as lumbar strain.  Red flag symptoms reviewed.Return to clinic with lack of resolution or progression of symptoms.        I have worn an N95 mask, gloves and eye protection for the entire encounter with this patient.     Differential diagnosis, natural history, supportive care, and indications for immediate follow-up discussed.

## 2024-09-04 ENCOUNTER — OFFICE VISIT (OUTPATIENT)
Dept: MEDICAL GROUP | Facility: MEDICAL CENTER | Age: 34
End: 2024-09-04
Payer: COMMERCIAL

## 2024-09-04 VITALS
HEIGHT: 61 IN | SYSTOLIC BLOOD PRESSURE: 114 MMHG | HEART RATE: 94 BPM | DIASTOLIC BLOOD PRESSURE: 60 MMHG | TEMPERATURE: 97.3 F | BODY MASS INDEX: 31.81 KG/M2 | WEIGHT: 168.5 LBS | OXYGEN SATURATION: 93 %

## 2024-09-04 DIAGNOSIS — G89.29 CHRONIC BILATERAL LOW BACK PAIN WITHOUT SCIATICA: Primary | ICD-10-CM

## 2024-09-04 DIAGNOSIS — Z80.3 FAMILY HISTORY OF BREAST CANCER: ICD-10-CM

## 2024-09-04 DIAGNOSIS — M54.50 CHRONIC BILATERAL LOW BACK PAIN WITHOUT SCIATICA: Primary | ICD-10-CM

## 2024-09-04 DIAGNOSIS — N64.4 BREAST PAIN IN FEMALE: ICD-10-CM

## 2024-09-04 DIAGNOSIS — F41.9 ANXIETY: ICD-10-CM

## 2024-09-04 PROCEDURE — 99214 OFFICE O/P EST MOD 30 MIN: CPT | Performed by: STUDENT IN AN ORGANIZED HEALTH CARE EDUCATION/TRAINING PROGRAM

## 2024-09-04 PROCEDURE — 3078F DIAST BP <80 MM HG: CPT | Performed by: STUDENT IN AN ORGANIZED HEALTH CARE EDUCATION/TRAINING PROGRAM

## 2024-09-04 PROCEDURE — 3074F SYST BP LT 130 MM HG: CPT | Performed by: STUDENT IN AN ORGANIZED HEALTH CARE EDUCATION/TRAINING PROGRAM

## 2024-09-04 RX ORDER — HYDROXYZINE HYDROCHLORIDE 25 MG/1
25 TABLET, FILM COATED ORAL
COMMUNITY
Start: 2024-09-04

## 2024-09-04 RX ORDER — CYCLOBENZAPRINE HCL 5 MG
5 TABLET ORAL 2 TIMES DAILY PRN
Qty: 30 TABLET | Refills: 0 | Status: SHIPPED | OUTPATIENT
Start: 2024-09-04 | End: 2024-09-22

## 2024-09-04 ASSESSMENT — FIBROSIS 4 INDEX: FIB4 SCORE: 0.48

## 2024-09-04 NOTE — PROGRESS NOTES
Subjective:     CC:   Chief Complaint   Patient presents with    Follow-Up    Referral Needed     To Physical therapist for back pain          HPI:   Deisy presents today with    Verbal consent was acquired by the patient to use [employer] ambient listening note generation during this visit Yes   History of Present Illness  The patient presents for evaluation of multiple medical concerns.    She is currently experiencing a cold, which started yesterday. She suspects she may have contracted the cold at the airport during her recent travel to [place]. She sought care at an urgent care facility due to severe back pain, where she was tested for strep but not COVID-19. Her sleep is being disrupted due to the back pain and congestion from her cold. She is considering taking hydroxyzine, a medication prescribed for her anxiety, to help with sleep.    She is also dealing with intermittent back pain that has been present for the past 2 months. The pain, initially on the right side, has now shifted to the left. She was previously referred to physical therapy, which provided some relief. However, the pain returned last month, leading her to seek care at an urgent care facility. She was advised to avoid heavy lifting and was given a steroid injection in her gluteus muscle and a prescription for Aleve.      She has been experiencing a burning sensation in her right breast for the past few weeks. Initially, she dismissed it as random, transient pain, but it has since become consistent. She does not report any lumps in her breast. She is aware that her mother's breast tumors were only detected on her third mammogram attempt, even though her mother experienced pain. She is curious about the implications of a positive genetic test result.    She has received paperwork from the genetics lab but has not yet scheduled an appointment due to her illness.    Results  Laboratory Studies  Alkaline phosphatase is improving. Presence of  "blood and white blood cells in urine.      Health Maintenance: Completed    ROS:  ROS    Review of systems unremarkable except for concerns noted by patient or items listed.    Please see HPI for additional ROS.      Objective:     Exam:  /60 (BP Location: Left arm, Patient Position: Sitting, BP Cuff Size: Adult long)   Pulse 94   Temp 36.3 °C (97.3 °F) (Temporal)   Ht 1.549 m (5' 1\")   Wt 76.4 kg (168 lb 8 oz)   LMP 09/02/2024   SpO2 93%   BMI 31.84 kg/m²  Body mass index is 31.84 kg/m².    Physical Exam  Constitutional:       Appearance: Normal appearance.   HENT:      Head: Normocephalic and atraumatic.      Nose: Nose normal.   Eyes:      Conjunctiva/sclera: Conjunctivae normal.   Cardiovascular:      Rate and Rhythm: Normal rate.   Musculoskeletal:         General: No swelling or tenderness. Normal range of motion.   Neurological:      Mental Status: She is alert and oriented to person, place, and time. Mental status is at baseline.   Psychiatric:         Mood and Affect: Mood normal.         Behavior: Behavior normal.             Labs: reviewed     Assessment & Plan:     33 y.o. female with the following -     1. Chronic bilateral low back pain without sciatica  The back pain has been ongoing for two months, with recent exacerbation. It is likely muscular in nature. She received a steroid medrol dose pack and Ketorolac at urgent care. A prescription for Flexeril 30 pills was provided, with instructions to take it in the evening. If pain persists after a week or 10 days, she can start taking the muscle relaxer. A referral for physical therapy was made.  - Referral to Physical Therapy  - cyclobenzaprine (FLEXERIL) 5 mg tablet; Take 1 Tablet by mouth 2 times a day as needed for Muscle Spasms or Moderate Pain.  Dispense: 30 Tablet; Refill: 0      2. Anxiety  Chronic, stable   Plan:  - hydrOXYzine HCl (ATARAX) 25 MG Tab; Take 1 Tablet by mouth 1 time a day as needed for Anxiety.    3. Family history of " breast cancer  4. Breast pain  The patient has been experiencing a burning pain in her right breast for the past few weeks. An ultrasound of the right breast was ordered to investigate further. The implications of a positive genetic test result were discussed, including the possibility of more frequent screenings or preventive surgeries if necessary.  - US-SCREENING WHOLE BREAST (3D SCREENING); Future      Return in about 3 months (around 12/4/2024) for preventive wellness.    Please note that this dictation was created using voice recognition software. I have made every reasonable attempt to correct obvious errors, but I expect that there are errors of grammar and possibly content that I did not discover before finalizing the note.

## 2024-09-05 ENCOUNTER — RESEARCH ENCOUNTER (OUTPATIENT)
Dept: RESEARCH | Facility: MEDICAL CENTER | Age: 34
End: 2024-09-05
Payer: COMMERCIAL

## 2024-09-05 DIAGNOSIS — Z00.6 RESEARCH STUDY PATIENT: ICD-10-CM

## 2024-09-05 NOTE — RESEARCH NOTE
Confirmed with the participant which designated provider they would like study results shared with. Patient will have an opportunity to share the results with any providers of their choosing in the future by accessing their results from [employer].

## 2024-09-16 ENCOUNTER — HOSPITAL ENCOUNTER (OUTPATIENT)
Dept: LAB | Facility: MEDICAL CENTER | Age: 34
End: 2024-09-16
Attending: STUDENT IN AN ORGANIZED HEALTH CARE EDUCATION/TRAINING PROGRAM
Payer: COMMERCIAL

## 2024-09-16 DIAGNOSIS — Z00.6 RESEARCH STUDY PATIENT: ICD-10-CM

## 2024-09-17 ENCOUNTER — HOSPITAL ENCOUNTER (OUTPATIENT)
Dept: RADIOLOGY | Facility: MEDICAL CENTER | Age: 34
End: 2024-09-17
Attending: STUDENT IN AN ORGANIZED HEALTH CARE EDUCATION/TRAINING PROGRAM
Payer: COMMERCIAL

## 2024-09-17 DIAGNOSIS — N64.4 BREAST PAIN IN FEMALE: ICD-10-CM

## 2024-09-17 PROCEDURE — 76642 ULTRASOUND BREAST LIMITED: CPT | Mod: RT

## 2024-09-17 PROCEDURE — G0279 TOMOSYNTHESIS, MAMMO: HCPCS

## 2024-09-20 DIAGNOSIS — G89.29 CHRONIC BILATERAL LOW BACK PAIN WITHOUT SCIATICA: ICD-10-CM

## 2024-09-20 DIAGNOSIS — M54.50 CHRONIC BILATERAL LOW BACK PAIN WITHOUT SCIATICA: ICD-10-CM

## 2024-09-20 NOTE — TELEPHONE ENCOUNTER
Received request via: Pharmacy    Was the patient seen in the last year in this department? Yes    Does the patient have an active prescription (recently filled or refills available) for medication(s) requested? No    Does the patient have halfway Plus and need 100-day supply? (This applies to ALL medications) Patient does not have SCP

## 2024-09-22 RX ORDER — CYCLOBENZAPRINE HCL 5 MG
5 TABLET ORAL 2 TIMES DAILY PRN
Qty: 30 TABLET | Refills: 0 | Status: SHIPPED | OUTPATIENT
Start: 2024-09-22

## 2024-09-27 ENCOUNTER — OFFICE VISIT (OUTPATIENT)
Dept: SLEEP MEDICINE | Facility: MEDICAL CENTER | Age: 34
End: 2024-09-27
Attending: NURSE PRACTITIONER
Payer: COMMERCIAL

## 2024-09-27 VITALS
OXYGEN SATURATION: 97 % | HEART RATE: 75 BPM | HEIGHT: 61 IN | SYSTOLIC BLOOD PRESSURE: 110 MMHG | BODY MASS INDEX: 31.91 KG/M2 | RESPIRATION RATE: 14 BRPM | WEIGHT: 169 LBS | DIASTOLIC BLOOD PRESSURE: 80 MMHG

## 2024-09-27 DIAGNOSIS — G47.33 OSA ON CPAP: ICD-10-CM

## 2024-09-27 PROCEDURE — 3074F SYST BP LT 130 MM HG: CPT | Performed by: NURSE PRACTITIONER

## 2024-09-27 PROCEDURE — 3079F DIAST BP 80-89 MM HG: CPT | Performed by: NURSE PRACTITIONER

## 2024-09-27 PROCEDURE — 99213 OFFICE O/P EST LOW 20 MIN: CPT | Performed by: NURSE PRACTITIONER

## 2024-09-27 PROCEDURE — 99214 OFFICE O/P EST MOD 30 MIN: CPT | Performed by: NURSE PRACTITIONER

## 2024-09-27 ASSESSMENT — FIBROSIS 4 INDEX: FIB4 SCORE: 0.48

## 2024-09-27 NOTE — PROGRESS NOTES
Chief Complaint   Patient presents with    Follow-Up     Last Office Visit 08/16/2024 with Dr. Carmona       HPI:  Deisy Gilbert is a 33 y.o. year old female here today for follow-up on LANDY with first compliance on CPAP.  Last seen 8/16/2020 for to establish care.  Previous home sleep study done on 5/31/2024 showed evidence of mild to moderate LANDY with an AHI of 14 with an RDI of 19.8.  O2 karen was 86% with 0.3 minutes less than or equal to 88% SpO2.  Order was placed for auto CPAP.  Patient presents today for follow-up..     Prior to therapy, the patient reports snoring, witnessed apneas, poor quality sleep, and chronic fatigue.  The patient goes to bed at 11 pm and wakes up at varies am. It usually takes the patient approximately 30 mins to fall asleep. The patient does not feel refreshed after sleeping and does nap during the day.     Patient we trialed a fullface mask, but switched to nasal cushion.  Since initial consult, patient's mother was diagnosed with breast cancer and is actually undergoing lumpectomy today in hospital.  Patient states that she has not been focused on using CPAP therapy due to being distracted with care for her mother.  With nasal cushion, patient does experience increased nasal congestion.  With CPAP use, patient did remember improved sleep quality.    30-day compliance reviewed with patient shows 21% use with an average time of 2 hours and 15 minutes.  Only 3 days with greater than 4-hour use.  AHI on therapy is 0.5.  Currently auto CPAP sent to 4 to 12 cm/H2O.  No evidence of persistent mask leak.    ROS: As per HPI and otherwise negative if not stated.    Past Medical History:   Diagnosis Date    Abdominal pain     Back pain     Back pain     Blood in urine     Daytime sleepiness     Depression     Diarrhea     Fatigue     Frequent headaches     Gasping for breath     Graves disease 08/18/2023    Heartburn     History of kidney stones     Hyperlipidemia     Hypertension      "Hyperthyroidism     Insomnia     Irregular periods     Morning headache     Nasal drainage     Nausea     Palpitations     Sleep apnea     Sweat, sweating, excessive     Weakness     Wears glasses     Weight loss        Past Surgical History:   Procedure Laterality Date    STENT PLACEMENT  10/2023    CYSTOSCOPY STENT PLACEMENT  2017       Family History   Problem Relation Age of Onset    Breast Cancer Mother     Hypertension Mother     Eczema Father     Hypertension Maternal Grandmother     Breast Cancer Paternal Aunt     Breast Cancer Paternal Aunt     No Known Problems Brother     Diabetes Paternal Uncle        Allergies as of 09/27/2024 - Reviewed 09/27/2024   Allergen Reaction Noted    Lactose  08/23/2022        Vitals:  /80 (BP Location: Left arm, Patient Position: Sitting, BP Cuff Size: Adult)   Pulse 75   Resp 14   Ht 1.549 m (5' 1\")   Wt 76.7 kg (169 lb)   SpO2 97%     Current medications as of today   Current Outpatient Medications   Medication Sig Dispense Refill    cyclobenzaprine (FLEXERIL) 5 mg tablet TAKE 1 TABLET BY MOUTH 2 TIMES A DAY AS NEEDED FOR MUSCLE SPASMS OR MODERATE PAIN. 30 Tablet 0    Cyanocobalamin (VITAMIN B 12 PO)       propranolol (INDERAL) 10 MG Tab Take 1 Tablet by mouth 3 times a day.      SUMAtriptan (IMITREX) 50 MG Tab Take 1 Tablet by mouth one time as needed for Migraine for up to 1 dose. 10 Tablet 3    Magnesium 200 MG Tab Take 200 mg by mouth every day.      diclofenac sodium (VOLTAREN) 1 % Gel Apply 2 g topically 4 times a day as needed (back pain). 50 g 0    ciclopirox (PENLAC) 8 % solution Apply evenly over entire nail plate nightly to all affected nails. 6 mL 3    Vitamin D, Cholecalciferol, (CHOLECALCIFEROL) 25 MCG (1000 UT) Tab Take 1,000 Units by mouth every day.      methimazole (TAPAZOLE) 10 MG Tab Take 1.5 Tablets by mouth every day. 140 Tablet 4    norgestrel-ethinyl estradiol (LOW-OGESTREL) 0.3-30 MG-MCG Tab Take 1 Tablet by mouth every day. 84 Tablet 4 "    Potassium Citrate 15 MEQ (1620 MG) Tab CR       hydrOXYzine HCl (ATARAX) 25 MG Tab Take 1 Tablet by mouth 1 time a day as needed for Anxiety.      methylPREDNISolone (MEDROL DOSEPAK) 4 MG Tablet Therapy Pack Follow schedule on package instructions. 21 Tablet 0     No current facility-administered medications for this visit.         Physical Exam:   Gen:           Alert and oriented, No apparent distress. Mood and affect appropriate, normal interaction with examiner.  Eyes:          PERRL, EOM intact, sclere white, conjunctive moist.  Ears:          Not examined.   Hearing:     Grossly intact.  Nose:          Normal, no lesions or deformities.  Dentition:    Good dentition.  Oropharynx:   Tongue normal, posterior pharynx without erythema or exudate.  Neck:        Supple, trachea midline, no masses.  Respiratory Effort: No intercostal retractions or use of accessory muscles.   Lung Auscultation:      Clear to auscultation bilaterally; no rales, rhonchi or wheezing.  CV:            Regular rate and rhythm. No murmurs, rubs or gallops.  Abd:           Not examined.   Lymphadenopathy: Not examined.  Gait and Station: Normal.  Digits and Nails: No clubbing, cyanosis, petechiae, or nodes.   Cranial Nerves: II-XII grossly intact.  Skin:        No rashes, lesions or ulcers noted.               Ext:           No cyanosis or edema.      Assessment:  1. LANDY on CPAP            Plan:  Just auto CPAP 7 to 12 cm/H2O.  Done wirelessly.  Patient to use CPAP for 4 weeks and return for compliance reassessment.  Vies nightly use for 4 hours for optimal benefit.    Please note that this dictation was created using voice recognition software. I have made every reasonable attempt to correct obvious errors, but it is possible there are errors of grammar and possibly content that I did not discover before finalizing the note.

## 2024-10-09 LAB
APOB+LDLR+PCSK9 GENE MUT ANL BLD/T: NOT DETECTED
BRCA1+BRCA2 DEL+DUP + FULL MUT ANL BLD/T: NOT DETECTED
MLH1+MSH2+MSH6+PMS2 GN DEL+DUP+FUL M: NOT DETECTED

## 2024-10-21 ENCOUNTER — PATIENT MESSAGE (OUTPATIENT)
Dept: MEDICAL GROUP | Facility: MEDICAL CENTER | Age: 34
End: 2024-10-21
Payer: COMMERCIAL

## 2024-10-21 DIAGNOSIS — R00.2 PALPITATIONS: ICD-10-CM

## 2024-10-22 ENCOUNTER — OFFICE VISIT (OUTPATIENT)
Dept: OPHTHALMOLOGY | Facility: MEDICAL CENTER | Age: 34
End: 2024-10-22
Payer: COMMERCIAL

## 2024-10-22 DIAGNOSIS — E05.00 GRAVES DISEASE: ICD-10-CM

## 2024-10-22 DIAGNOSIS — H05.89 THYROID ORBITOPATHY: ICD-10-CM

## 2024-10-22 DIAGNOSIS — E07.9 THYROID ORBITOPATHY: ICD-10-CM

## 2024-10-22 DIAGNOSIS — R51.9 NEW ONSET HEADACHE: ICD-10-CM

## 2024-10-22 PROCEDURE — 99213 OFFICE O/P EST LOW 20 MIN: CPT | Mod: 25 | Performed by: STUDENT IN AN ORGANIZED HEALTH CARE EDUCATION/TRAINING PROGRAM

## 2024-10-22 PROCEDURE — 92133 CPTRZD OPH DX IMG PST SGM ON: CPT | Performed by: STUDENT IN AN ORGANIZED HEALTH CARE EDUCATION/TRAINING PROGRAM

## 2024-10-22 ASSESSMENT — CONF VISUAL FIELD
OD_NORMAL: 1
OS_INFERIOR_NASAL_RESTRICTION: 0
OS_SUPERIOR_NASAL_RESTRICTION: 0
OD_INFERIOR_TEMPORAL_RESTRICTION: 0
OD_SUPERIOR_TEMPORAL_RESTRICTION: 0
OS_NORMAL: 1
OS_SUPERIOR_TEMPORAL_RESTRICTION: 0
OS_INFERIOR_TEMPORAL_RESTRICTION: 0
OD_SUPERIOR_NASAL_RESTRICTION: 0
OD_INFERIOR_NASAL_RESTRICTION: 0

## 2024-10-22 ASSESSMENT — CUP TO DISC RATIO
OS_RATIO: 0.3
OD_RATIO: 0.3

## 2024-10-22 ASSESSMENT — VISUAL ACUITY
METHOD: SNELLEN - LINEAR
OS_CC: J1+
CORRECTION_TYPE: GLASSES
OD_CC: J1+
OD_CC: 20/20
OS_CC: 20/20

## 2024-10-22 ASSESSMENT — REFRACTION_MANIFEST
METHOD_AUTOREFRACTION: 1
OD_CYLINDER: +1.00
OS_SPHERE: -0.25
OS_AXIS: 078
OS_CYLINDER: +0.75
OD_AXIS: 104
OD_SPHERE: -0.75

## 2024-10-22 ASSESSMENT — TONOMETRY
OS_IOP_MMHG: 17
IOP_METHOD: TONOPEN
OS_IOP_MMHG: 21
OD_IOP_MMHG: 18
OD_IOP_MMHG: 22

## 2024-10-22 ASSESSMENT — REFRACTION_WEARINGRX
SPECS_TYPE: TRIFOCAL
OS_AXIS: 068
OD_ADD: +0.75
OD_SPHERE: -2.00
OS_CYLINDER: +0.50
OD_CYLINDER: +1.00
OS_ADD: +0.75
OD_AXIS: 105
OS_SPHERE: -1.50

## 2024-10-22 ASSESSMENT — EXTERNAL EXAM - LEFT EYE: OS_EXAM: NORMAL

## 2024-10-22 ASSESSMENT — EXTERNAL EXAM - RIGHT EYE: OD_EXAM: NORMAL

## 2024-10-22 ASSESSMENT — MARGIN REFLEX DISTANCE
OS_MRD1: 4
OD_MRD1: 3
OS_MRD2: 6
OD_MRD2: 7

## 2024-10-22 ASSESSMENT — ENCOUNTER SYMPTOMS
BLURRED VISION: 1
HEADACHES: 1
EYE PAIN: 1

## 2024-10-22 ASSESSMENT — SLIT LAMP EXAM - LIDS
COMMENTS: NORMAL
COMMENTS: NORMAL

## 2024-10-31 ENCOUNTER — OFFICE VISIT (OUTPATIENT)
Dept: SLEEP MEDICINE | Facility: MEDICAL CENTER | Age: 34
End: 2024-10-31
Attending: NURSE PRACTITIONER
Payer: COMMERCIAL

## 2024-10-31 VITALS
HEART RATE: 90 BPM | WEIGHT: 165 LBS | RESPIRATION RATE: 16 BRPM | DIASTOLIC BLOOD PRESSURE: 70 MMHG | BODY MASS INDEX: 31.15 KG/M2 | OXYGEN SATURATION: 98 % | HEIGHT: 61 IN | SYSTOLIC BLOOD PRESSURE: 120 MMHG

## 2024-10-31 DIAGNOSIS — G47.33 OSA ON CPAP: ICD-10-CM

## 2024-10-31 PROCEDURE — 99213 OFFICE O/P EST LOW 20 MIN: CPT | Performed by: NURSE PRACTITIONER

## 2024-10-31 ASSESSMENT — FIBROSIS 4 INDEX: FIB4 SCORE: 0.5

## 2024-11-05 ENCOUNTER — OFFICE VISIT (OUTPATIENT)
Dept: MEDICAL GROUP | Facility: MEDICAL CENTER | Age: 34
End: 2024-11-05
Payer: COMMERCIAL

## 2024-11-05 VITALS
WEIGHT: 164 LBS | OXYGEN SATURATION: 96 % | DIASTOLIC BLOOD PRESSURE: 70 MMHG | HEIGHT: 61 IN | SYSTOLIC BLOOD PRESSURE: 112 MMHG | HEART RATE: 80 BPM | BODY MASS INDEX: 30.96 KG/M2 | TEMPERATURE: 96.8 F

## 2024-11-05 DIAGNOSIS — G47.33 OBSTRUCTIVE SLEEP APNEA SYNDROME: ICD-10-CM

## 2024-11-05 DIAGNOSIS — G89.29 CHRONIC BILATERAL LOW BACK PAIN WITHOUT SCIATICA: Primary | ICD-10-CM

## 2024-11-05 DIAGNOSIS — Z23 NEED FOR VACCINATION: ICD-10-CM

## 2024-11-05 DIAGNOSIS — M54.50 CHRONIC BILATERAL LOW BACK PAIN WITHOUT SCIATICA: Primary | ICD-10-CM

## 2024-11-05 PROCEDURE — 90471 IMMUNIZATION ADMIN: CPT | Performed by: STUDENT IN AN ORGANIZED HEALTH CARE EDUCATION/TRAINING PROGRAM

## 2024-11-05 PROCEDURE — 99214 OFFICE O/P EST MOD 30 MIN: CPT | Mod: 25 | Performed by: STUDENT IN AN ORGANIZED HEALTH CARE EDUCATION/TRAINING PROGRAM

## 2024-11-05 PROCEDURE — 90746 HEPB VACCINE 3 DOSE ADULT IM: CPT | Performed by: STUDENT IN AN ORGANIZED HEALTH CARE EDUCATION/TRAINING PROGRAM

## 2024-11-05 PROCEDURE — 3074F SYST BP LT 130 MM HG: CPT | Performed by: STUDENT IN AN ORGANIZED HEALTH CARE EDUCATION/TRAINING PROGRAM

## 2024-11-05 PROCEDURE — 3078F DIAST BP <80 MM HG: CPT | Performed by: STUDENT IN AN ORGANIZED HEALTH CARE EDUCATION/TRAINING PROGRAM

## 2024-11-05 PROCEDURE — 90472 IMMUNIZATION ADMIN EACH ADD: CPT | Performed by: STUDENT IN AN ORGANIZED HEALTH CARE EDUCATION/TRAINING PROGRAM

## 2024-11-05 PROCEDURE — 90656 IIV3 VACC NO PRSV 0.5 ML IM: CPT | Performed by: STUDENT IN AN ORGANIZED HEALTH CARE EDUCATION/TRAINING PROGRAM

## 2024-11-05 ASSESSMENT — FIBROSIS 4 INDEX: FIB4 SCORE: 0.5

## 2024-11-05 NOTE — PROGRESS NOTES
"Subjective:     CC:   Chief Complaint   Patient presents with    Paperwork     Letter for work         HPI:   Deisy presents today with  Verbal consent was acquired by the patient to use [employer] ambient listening note generation during this visit Yes   History of Present Illness  The patient is here for a follow-up.    She reports an improvement in her back pain since her last visit in 09/2024, but still expresses some concerns. Her job requires her to sit for over 8 hours a day, which exacerbates her discomfort. She has a physical therapy appointment scheduled for Thursday morning and is seeking a doctor's note for a standing desk at work.  Requesting doctor’s note for work to accommodate  needs for an ergonomic desk, automatic standing desk. From time to time  experiencing discomfort from chronic lower back and buttocks area for seating long periods. At work she is sitting anywhere between 8-9 hours a day in the office.       She has been using a CPAP machine for the past 6 months, but her sleep doctor has indicated that her usage is insufficient. She has been experimenting with different sleep masks and has found two that she prefers. However, she struggles to keep the mask on for more than 2 hours as she often wakes up without it.    She has lost 5 pounds in the last 2 months and is actively trying to lose more weight.    She has been experiencing intermittent heart palpitations and has a cardiology appointment scheduled for next Tuesday. .    She has not been seeing her endocrinologist frequently as her levels have been stable.             Health Maintenance: Completed    ROS:  ROS    Review of systems unremarkable except for concerns noted by patient or items listed.    Please see HPI for additional ROS.      Objective:     Exam:  /70 (BP Location: Left arm, Patient Position: Sitting, BP Cuff Size: Adult)   Pulse 80   Temp 36 °C (96.8 °F) (Temporal)   Ht 1.549 m (5' 1\")   Wt 74.4 kg (164 lb)   " LMP 10/28/2024   SpO2 96%   BMI 30.99 kg/m²  Body mass index is 30.99 kg/m².    Physical Exam  Constitutional:       Appearance: Normal appearance.   HENT:      Head: Normocephalic.   Eyes:      General: No scleral icterus.  Cardiovascular:      Rate and Rhythm: Normal rate and regular rhythm.      Pulses: Normal pulses.      Heart sounds: Normal heart sounds.   Pulmonary:      Effort: Pulmonary effort is normal.      Breath sounds: Normal breath sounds.   Musculoskeletal:      Right lower leg: No edema.      Left lower leg: No edema.   Skin:     General: Skin is warm.   Neurological:      Mental Status: She is alert and oriented to person, place, and time.   Psychiatric:         Mood and Affect: Mood normal.         Behavior: Behavior normal.             Labs: reviewed     Assessment & Plan:     34 y.o. female with the following -     1. Chronic bilateral low back pain without sciatica (Primary)  The back pain has been ongoing for two months, with recent exacerbation. It is likely muscular in nature. She received a steroid medrol dose pack and Ketorolac at urgent care in past which helped. Has tried as needed Flexeril . A referral for physical therapy was made. Appointments scheduled . Starting therapy this week.   Requesting letter for work for standing desk as her back pain worsens with constant sitting .  Plan:  A letter was provided to her employer recommending the provision of an electric standing desk to alleviate her back pain. She was also advised to avoid prolonged periods of sitting or standing.      2. Need for vaccination  - INFLUENZA VACCINE TRI INJ (PF)  - Hepatitis B Vaccine Adult IM    3. Obstructive sleep apnea syndrome  Chronic, stable  [facility]   Started on cpap - 07/2024   plan:  Continue cpap use   Continue f/u with sleep medicine       Heart palpitations.  She reports intermittent heart palpitations. She has a cardiology appointment scheduled for next Tuesday to further evaluate  this issue. It was discussed that her thyroid levels are stable, making it less likely that the palpitations are related to her Graves' disease.    Weight loss.  She has lost 5 pounds over the last 2 months. She is advised to continue her efforts in weight management.      No follow-ups on file.    Please note that this dictation was created using voice recognition software. I have made every reasonable attempt to correct obvious errors, but I expect that there are errors of grammar and possibly content that I did not discover before finalizing the note.

## 2024-11-05 NOTE — LETTER
November 5, 2024        Patient: Deisy Gilbert   YOB: 1990   Date of Visit: 11/5/2024           To Whom It May Concern:    Deisy Gilbert was seen and treated in our department on 11/5/2024.   It is my medical opinion that Deisy Gilbert would benefit with an electric standing desk as a reasonable accommodation to help manage her ongoing lower back pain, which is significantly aggravated by prolonged sitting at a traditional desk. I would recommended to avoid continuous or prolonged sitting or prolonged standing positions. Changing position would be reasonable to avoid worsening of back pain.    If you have any questions or concerns, please don't hesitate to call.        Sincerely,          Nanci Lucero M.D.  Electronically Signed    [facility]  [address]  [address]  [place] [address]  [phone] (Phone)  [phone] (Fax)

## 2024-11-07 ENCOUNTER — PHYSICAL THERAPY (OUTPATIENT)
Dept: PHYSICAL THERAPY | Facility: REHABILITATION | Age: 34
End: 2024-11-07
Attending: STUDENT IN AN ORGANIZED HEALTH CARE EDUCATION/TRAINING PROGRAM
Payer: COMMERCIAL

## 2024-11-07 DIAGNOSIS — M54.50 CHRONIC BILATERAL LOW BACK PAIN WITHOUT SCIATICA: ICD-10-CM

## 2024-11-07 DIAGNOSIS — G89.29 CHRONIC BILATERAL LOW BACK PAIN WITHOUT SCIATICA: ICD-10-CM

## 2024-11-07 PROCEDURE — 97162 PT EVAL MOD COMPLEX 30 MIN: CPT | Performed by: PHYSICAL THERAPIST

## 2024-11-07 PROCEDURE — 97110 THERAPEUTIC EXERCISES: CPT | Performed by: PHYSICAL THERAPIST

## 2024-11-07 SDOH — ECONOMIC STABILITY: GENERAL: QUALITY OF LIFE: GOOD

## 2024-11-07 ASSESSMENT — ENCOUNTER SYMPTOMS
PAIN SCALE: 0
ALLEVIATING FACTORS: REST
EXACERBATED BY: CARRYING
EXACERBATED BY: SQUATTING
EXACERBATED BY: ACTIVITY
EXACERBATED BY: LIFTING
EXACERBATED BY: BENDING
QUALITY: ACHING
QUALITY: SHARP
ALLEVIATING FACTORS: STRETCHING
EXACERBATED BY: JUMPING
ALLEVIATING FACTORS: POSITION CHANGE

## 2024-11-07 NOTE — OP THERAPY EVALUATION
Outpatient Physical Therapy  INITIAL EVALUATION    [facility] [address].  [address]  [place] [address]  Phone:  [phone]  Fax:  [phone]    Date of Evaluation: 2024    Patient: Deisy Gilbert  YOB: 1990  MRN: [id number]     Referring Provider: Nanci Lucero M.D.  [address] [facility]  [address]  [place] [address]   Referring Diagnosis Chronic bilateral low back pain without sciatica [M54.50, G89.29]     Time Calculation  Start time: 930  Stop time: 1010 Time Calculation (min): 40 minutes     Chief Complaint: Back Problem    Visit Diagnoses     ICD-10-CM   1. Chronic bilateral low back pain without sciatica  M54.50    G89.29       Date of onset of impairment: 2024    Subjective:   History of Present Illness:     Date of onset:  2024    Mechanism of injury:  Deisy reports that she started a weight loss journey this summer. While she was doing squats with a [employer] in , she felt a sharp pain in her lower R back that made it hard to stand up again with the weight. She managed to because there was no one there to help her. Her symptoms didn't continue very much but she a similar pain a couple other times in August. This time it occurred when bending forward to  weight, then later when doing a pull up.     She denies pain right now in her legs. She got discomfort into her legs in August which felt tingly. She got an epidural injection which seemed to help and she hasn't had this sensation for several weeks.    She has been self limiting both at the gym and at home since that time because she is afraid of causing reoccurrence or making it worse. She would like to continue her weight loss and strengthening journey but wants to make sure that she is doing things correctly.      Quality of life:  Good  Sleep disturbance:  Interrupted sleep  Pain:     Current pain ratin    Quality:  Aching and sharp    Relieving factors:  Rest,  stretching and position change    Aggravating factors:  Activity, lifting, squatting, carrying, bending and jumping  Patient Goals:     Patient goals for therapy:  Decreased pain and return to sport/leisure activities      Past Medical History:   Diagnosis Date    Abdominal pain     Back pain     Back pain     Blood in urine     Daytime sleepiness     Depression     Diarrhea     Fatigue     Frequent headaches     Gasping for breath     Graves disease 2023    Heartburn     History of kidney stones     Hyperlipidemia     Hypertension     Hyperthyroidism     Insomnia     Irregular periods     Morning headache     Nasal drainage     Nausea     Palpitations     Sleep apnea     Sweat, sweating, excessive     Weakness     Wears glasses     Weight loss      Past Surgical History:   Procedure Laterality Date    STENT PLACEMENT  10/2023    CYSTOSCOPY STENT PLACEMENT  2017     Social History     Tobacco Use    Smoking status: Former     Current packs/day: 0.00     Types: Cigarettes     Start date: 2008     Quit date: 2013     Years since quittin.8    Smokeless tobacco: Never    Tobacco comments:     I did not smoke packs. I only smoked 2 cigarettes ever week or so.   Substance Use Topics    Alcohol use: Not Currently     Family and Occupational History     Socioeconomic History    Marital status: Single     Spouse name: Not on file    Number of children: Not on file    Years of education: Not on file    Highest education level: Bachelor's degree (e.g., BA, AB, BS)   Occupational History    Not on file       Objective     Postural Observations  Seated posture: fair  Standing posture: fair  Correction of posture: has no consistent effect      Hip Screen   Hip range of motion within functional limits.    Neurological Testing     Reflexes   Left   Patellar (L4): normal (2+)    Right   Patellar (L4): normal (2+)    Active Range of Motion     Lumbar   Flexion: within functional limits  Extension: within  functional limits  Left lateral flexion: within functional limits  Right lateral flexion: within functional limits  Left rotation: within functional limits  Right rotation: within functional limits  Left Hip   Normal active range of motion    Right Hip   Normal active range of motion    Strength:      Abdominals   Left: 3+  Right: 3+    Left Hip   Planes of Motion   Flexion: 4-  Extension: 3+  Abduction: 3  Adduction: 4-  External rotation: 3+  Internal rotation: 4-    Right Hip   Planes of Motion   Flexion: 4-  Extension: 3+  Abduction: 3  Adduction: 4-  External rotation: 3+  Internal rotation: 4-    Left Knee   Flexion: 4  Extension: 4    Right Knee   Flexion: 4  Extension: 4    Tests     Left Pelvic Girdle/Sacrum   Negative: sacral thrust.     Right Pelvic Girdle/Sacrum   Negative: sacral thrust.     Left Hip   Negative PILAR, piriformis, SI compression and SI distraction.   SLR: Negative.     Right Hip   Negative PILAR, piriformis, SI compression and SI distraction.   SLR: Negative.     Functional Assessment     Comments  Squat: increase lumbar lordosis, mild increase anterior tibial translation        Therapeutic Exercises (CPT 40460):     1. TrA activation    2. supine march + TrA    3. HL bridge + TrA      Therapeutic Exercise Summary: HEP: DKTC, LTR, SL open book, TrA activation, supine march, bridge      Time-based treatments/modalities:    Physical Therapy Timed Treatment Charges  Therapeutic exercise minutes (CPT 13559): 9 minutes      Assessment, Response and Plan:   Impairments: abnormal or restricted ROM, impaired functional mobility, hypersensitivity, impaired physical strength, lacks appropriate home exercise program and pain with function    Assessment details:  Patient is a pleasant 33 yo female c c/o intermittent back pain that began in June with beginning a gym routine for health and weight loss. While performing a weighted squat, she felt pain in her low back. She previously had intermittent  paresthesia but that resolved about 3 months ago. Patient presentation at this time consistent with myofascial etiology, with increased sensitivity in R QL and paraspinals. Along with this, Deisy presents with contributing deficits to lumbopelvic postural awareness and control, strength (especially abdominal and hip musculature), and flexibility (especially hip flexors). Deisy's deficits are preventing her from exercising the way that she would like to for overall health, and are causing intermittent pain with normal daily activity, such as squatting, bending, lifting, pushing and pulling. She will benefit from a comprehensive POC and HEP in skilled PT to address her deficits and restore pain free function.   Prognosis: good    Goals:   Short Term Goals:   1. Patient will demonstrate independent regular performance of tailored home exercise program in order to facilitate recovery and promote self treatment and patient ownership of recovery   2. Patient will demo normal flexibility to bilateral hip flexor musculature to reduce pull into anterior pelvic tilt and reduce lumbar hyperlordosis during squat lift and other full body transitional movements  3. Patient will demo core strength >/= 4/5 (3+/5 at eval) in order to indicate strength needed to control and mitigate symptoms with performance of domestic duties and community participation.   Short term goal time span:  2-4 weeks      Long Term Goals:    1. Patient will demo bilateral hip abduction strength >/= 4-/5 (3/5 at eval) to improve sagittal plane stability during squat as well as single leg phase of gait to reduce QL compensation  2. Patient will demo bilateral hip extension strength >/= 4/5 (3+/5 at eval) to improve transfer and lift mechanics and reduce lumbar strain  3. Patient will self report </= 12% disability via Oswestry functional assessment questionnaire (26% at eval)   Long term goal time span:  6-8 weeks    Plan:   Planned therapy  interventions:  Neuromuscular Re-education (CPT 59623), Therapeutic Exercise (CPT 22586) and Therapeutic Activities (CPT 58110)  Frequency:  2x week  Duration in weeks:  8  Discussed with:  Patient      Functional Assessment Used  Oswestry Low Back Pain Disability Total Score: 26     Referring provider co-signature:  I have reviewed this plan of care and my co-signature certifies the need for services.    Certification Period: 11/07/2024 to  01/02/25    Physician Signature: ________________________________ Date: ______________

## 2024-11-12 ENCOUNTER — OFFICE VISIT (OUTPATIENT)
Dept: CARDIOLOGY | Facility: MEDICAL CENTER | Age: 34
End: 2024-11-12
Attending: STUDENT IN AN ORGANIZED HEALTH CARE EDUCATION/TRAINING PROGRAM
Payer: COMMERCIAL

## 2024-11-12 ENCOUNTER — PHYSICAL THERAPY (OUTPATIENT)
Dept: PHYSICAL THERAPY | Facility: REHABILITATION | Age: 34
End: 2024-11-12
Attending: STUDENT IN AN ORGANIZED HEALTH CARE EDUCATION/TRAINING PROGRAM
Payer: COMMERCIAL

## 2024-11-12 VITALS
HEIGHT: 61 IN | WEIGHT: 170 LBS | HEART RATE: 97 BPM | RESPIRATION RATE: 16 BRPM | BODY MASS INDEX: 32.1 KG/M2 | OXYGEN SATURATION: 85 % | SYSTOLIC BLOOD PRESSURE: 118 MMHG | DIASTOLIC BLOOD PRESSURE: 80 MMHG

## 2024-11-12 DIAGNOSIS — R00.2 PALPITATIONS: ICD-10-CM

## 2024-11-12 DIAGNOSIS — G89.29 CHRONIC BILATERAL LOW BACK PAIN WITHOUT SCIATICA: ICD-10-CM

## 2024-11-12 DIAGNOSIS — E05.00 GRAVES DISEASE: ICD-10-CM

## 2024-11-12 DIAGNOSIS — M54.50 CHRONIC BILATERAL LOW BACK PAIN WITHOUT SCIATICA: ICD-10-CM

## 2024-11-12 PROCEDURE — 97110 THERAPEUTIC EXERCISES: CPT | Performed by: PHYSICAL THERAPIST

## 2024-11-12 PROCEDURE — 99213 OFFICE O/P EST LOW 20 MIN: CPT | Performed by: INTERNAL MEDICINE

## 2024-11-12 PROCEDURE — 99214 OFFICE O/P EST MOD 30 MIN: CPT | Performed by: INTERNAL MEDICINE

## 2024-11-12 ASSESSMENT — FIBROSIS 4 INDEX: FIB4 SCORE: 0.5

## 2024-11-12 NOTE — OP THERAPY DAILY TREATMENT
"  Outpatient Physical Therapy  DAILY TREATMENT     [facility] Physical [address].  [address]  [place] [address]  Phone:  [phone]  Fax:  [phone]    Date: 11/12/2024    Patient: Deisy Gilbert  YOB: 1990  MRN: [id number]     Time Calculation    Start time: 1430  Stop time: 1515 Time Calculation (min): 45 minutes     Chief Complaint: Back Problem    Visit #: 2    SUBJECTIVE:  Deisy reports that she was able to try her initial home exercises and that they didn't bother her too much. There were a couple of them that she had questions about though.    OBJECTIVE:  Current objective measures: Patient seen for first time follow up with presentation consistent with initial evaluation          Therapeutic Exercises (CPT 02008):     1. NuStep w/u, level 5, 6'    2. TrA + ball press in HL, x30+3\"    3. TrA + hip abduction isometric, x30+3\"    4. TrA + Hip extension c ball, x30+3\"    5. TrA + SLR, x30    6. seated on PB + feet together balance, 3'    7. seated on PB + alt LAQ, 3'    8. bird dog, x20 ea    9. thread the needle EOP, x20 ea      Therapeutic Exercise Summary: HEP: DKTC, LTR, SL open book, TrA activation, supine march, bridge      Time-based treatments/modalities:       ASSESSMENT:   Response to treatment: Reviewed HEP to ensure correct performance by patient. Good initial response to therapy, with patient requiring mod cueing tactile and verbal for TrA activation and lumbopelvic proprioception and postural control. Improvement within session. Deisy will continue to benefit from skilled PT intervention    PLAN/RECOMMENDATIONS:   Plan for treatment: therapy treatment to continue next visit.  Planned interventions for next visit: continue with current treatment.         "

## 2024-11-12 NOTE — PROGRESS NOTES
"CARDIOLOGY NEW PATIENT CONSULTATION    PCP: Nanci Lucero M.D.    1. Palpitations    2. Graves disease        Deisy Gilbert is experiencing palpitations-some episodes longer lived which may indicate clinically meaningful arrhythmia.  I recommended further assessment with Zio patch.  For now she can continue to use beta-blockers as needed though if she finds this a regular occurrence then she may prefer a once daily agent like metoprolol.    Follow up: to be determined after testing is complete      History: Deisy Gilbert is a 34 y.o. female with Graves' disease undergoing treatment with methimazole for approximately the past year and euthyroid as of August presenting for assessment of palpitations.  She did experience these first before the diagnosis of Graves' and after treatment was initiated the symptoms subsided.  They however returned in August.  1 evening she felt fluttering in the chest which was continuous for about 14 hours.  At that time the heart rate was measured in the low 90s, with her usual resting heart rate of 75.    She feels well in other regards.    PE:  /80 (BP Location: Left arm, Patient Position: Sitting, BP Cuff Size: Adult)   Pulse 97   Resp 16   Ht 1.549 m (5' 1\")   Wt 77.1 kg (170 lb)   LMP 10/28/2024   SpO2 (!) 85%   BMI 32.12 kg/m²   GEN: NAD  RESP: CTAB  CVS: RRR, No M/R/G  ABD: Soft, NT/ND  EXT: WWP, no edema     Today's encounter addressed a new undiagnosed problem with uncertain prognosis.    The ASCVD Risk score (Hayley BOWERS, et al., 2019) failed to calculate.    Studies  Lab Results   Component Value Date/Time    CHOLSTRLTOT 196 03/11/2024 11:38 AM     (H) 03/11/2024 11:38 AM    HDL 45 03/11/2024 11:38 AM    TRIGLYCERIDE 106 03/11/2024 11:38 AM       Lab Results   Component Value Date/Time    SODIUM 139 08/09/2024 08:03 AM    POTASSIUM 4.2 08/09/2024 08:03 AM    CHLORIDE 106 08/09/2024 08:03 AM    CO2 18 (L) 08/09/2024 08:03 AM    GLUCOSE 127 (H) 08/09/2024 " "08:03 AM    BUN 10 08/09/2024 08:03 AM    CREATININE 0.64 08/09/2024 08:03 AM      No results found for: \"PROTHROMBTM\", \"INR\"   Lab Results   Component Value Date/Time    WBC 8.8 06/04/2024 08:52 PM    RBC 5.32 06/04/2024 08:52 PM    HEMOGLOBIN 15.7 06/04/2024 08:52 PM    HEMATOCRIT 45.6 06/04/2024 08:52 PM    MCV 85.7 06/04/2024 08:52 PM    MCH 29.5 06/04/2024 08:52 PM    MCHC 34.4 06/04/2024 08:52 PM    MPV 9.8 06/04/2024 08:52 PM    NEUTSPOLYS 53.20 06/04/2024 08:52 PM    LYMPHOCYTES 34.90 06/04/2024 08:52 PM    MONOCYTES 6.70 06/04/2024 08:52 PM    EOSINOPHILS 4.20 06/04/2024 08:52 PM    BASOPHILS 0.80 06/04/2024 08:52 PM        Past Medical History:   Diagnosis Date    Abdominal pain     Back pain     Back pain     Blood in urine     Daytime sleepiness     Depression     Diarrhea     Fatigue     Frequent headaches     Gasping for breath     Graves disease 08/18/2023    Heartburn     History of kidney stones     Hyperlipidemia     Hypertension     Hyperthyroidism     Insomnia     Irregular periods     Morning headache     Nasal drainage     Nausea     Palpitations     Sleep apnea     Sweat, sweating, excessive     Weakness     Wears glasses     Weight loss      Past Surgical History:   Procedure Laterality Date    STENT PLACEMENT  10/2023    CYSTOSCOPY STENT PLACEMENT  2017     Allergies   Allergen Reactions    Lactose      Other reaction(s): GI Intolerance     Outpatient Encounter Medications as of 11/12/2024   Medication Sig Dispense Refill    cyclobenzaprine (FLEXERIL) 5 mg tablet TAKE 1 TABLET BY MOUTH 2 TIMES A DAY AS NEEDED FOR MUSCLE SPASMS OR MODERATE PAIN. 30 Tablet 0    Cyanocobalamin (VITAMIN B 12 PO)       propranolol (INDERAL) 10 MG Tab Take 1 Tablet by mouth 3 times a day.      SUMAtriptan (IMITREX) 50 MG Tab Take 1 Tablet by mouth one time as needed for Migraine for up to 1 dose. 10 Tablet 3    Magnesium 200 MG Tab Take 200 mg by mouth every day.      diclofenac sodium (VOLTAREN) 1 % Gel Apply 2 g " topically 4 times a day as needed (back pain). 50 g 0    ciclopirox (PENLAC) 8 % solution Apply evenly over entire nail plate nightly to all affected nails. 6 mL 3    Vitamin D, Cholecalciferol, (CHOLECALCIFEROL) 25 MCG (1000 UT) Tab Take 1,000 Units by mouth every day.      methimazole (TAPAZOLE) 10 MG Tab Take 1.5 Tablets by mouth every day. 140 Tablet 4    norgestrel-ethinyl estradiol (LOW-OGESTREL) 0.3-30 MG-MCG Tab Take 1 Tablet by mouth every day. 84 Tablet 4    Potassium Citrate 15 MEQ (1620 MG) Tab CR        No facility-administered encounter medications on file as of 2024.     Social History     Socioeconomic History    Marital status: Single     Spouse name: Not on file    Number of children: Not on file    Years of education: Not on file    Highest education level: Bachelor's degree (e.g., BA, AB, BS)   Occupational History    Not on file   Tobacco Use    Smoking status: Former     Current packs/day: 0.00     Types: Cigarettes     Start date: 2008     Quit date: 2013     Years since quittin.8    Smokeless tobacco: Never    Tobacco comments:     I did not smoke packs. I only smoked 2 cigarettes ever week or so.   Vaping Use    Vaping status: Never Used   Substance and Sexual Activity    Alcohol use: Not Currently    Drug use: Never    Sexual activity: Not on file   Other Topics Concern    Not on file   Social History Narrative    Senior tech artist      Social Drivers of Health     Financial Resource Strain: Low Risk  (2023)    Overall Financial Resource Strain (CARDIA)     Difficulty of Paying Living Expenses: Not very hard   Food Insecurity: No Food Insecurity (2023)    Hunger Vital Sign     Worried About Running Out of Food in the Last Year: Never true     Ran Out of Food in the Last Year: Never true   Transportation Needs: No Transportation Needs (2023)    PRAPARE - Transportation     Lack of Transportation (Medical): No     Lack of Transportation (Non-Medical): No    Physical Activity: Insufficiently Active (8/21/2023)    Exercise Vital Sign     Days of Exercise per Week: 3 days     Minutes of Exercise per Session: 30 min   Stress: Stress Concern Present (8/21/2023)    Citizen of [place] of Occupational Health - Occupational Stress Questionnaire     Feeling of Stress : To some extent   Social Connections: Socially Isolated (8/21/2023)    Social Connection and Isolation Panel [NHANES]     Frequency of Communication with Friends and Family: More than three times a week     Frequency of Social Gatherings with Friends and Family: Patient declined     Attends Catholic Services: Never     Active Member of Clubs or Organizations: No     Attends Club or Organization Meetings: Never     Marital Status: Never    Intimate Partner Violence: Not on file   Housing Stability: Low Risk  (8/21/2023)    Housing Stability Vital Sign     Unable to Pay for Housing in the Last Year: No     Number of Places Lived in the Last Year: 1     Unstable Housing in the Last Year: No     Family History   Problem Relation Age of Onset    Breast Cancer Mother     Hypertension Mother     Eczema Father     Hypertension Maternal Grandmother     Breast Cancer Paternal Aunt     Breast Cancer Paternal Aunt     No Known Problems Brother     Diabetes Paternal Uncle        Chief Complaint   Patient presents with    Palpitations       ROS:   10 point review systems is otherwise negative except as per the HPI

## 2024-11-26 ENCOUNTER — NON-PROVIDER VISIT (OUTPATIENT)
Dept: CARDIOLOGY | Facility: MEDICAL CENTER | Age: 34
End: 2024-11-26
Attending: INTERNAL MEDICINE
Payer: COMMERCIAL

## 2024-11-26 DIAGNOSIS — R00.2 PALPITATIONS: ICD-10-CM

## 2024-11-26 PROCEDURE — 93246 EXT ECG>7D<15D RECORDING: CPT

## 2024-11-26 NOTE — PROGRESS NOTES
Patient enrolled in the 14 day o Holter monitoring program per Tushar Duong MD.  >Office hook-up, serial # [id number].  >Currently pending EOS.

## 2024-12-05 ENCOUNTER — PHYSICAL THERAPY (OUTPATIENT)
Dept: PHYSICAL THERAPY | Facility: REHABILITATION | Age: 34
End: 2024-12-05
Attending: STUDENT IN AN ORGANIZED HEALTH CARE EDUCATION/TRAINING PROGRAM
Payer: COMMERCIAL

## 2024-12-05 DIAGNOSIS — M54.50 CHRONIC BILATERAL LOW BACK PAIN WITHOUT SCIATICA: ICD-10-CM

## 2024-12-05 DIAGNOSIS — G89.29 CHRONIC BILATERAL LOW BACK PAIN WITHOUT SCIATICA: ICD-10-CM

## 2024-12-05 PROCEDURE — 97110 THERAPEUTIC EXERCISES: CPT | Performed by: PHYSICAL THERAPIST

## 2024-12-05 NOTE — OP THERAPY DAILY TREATMENT
"  Outpatient Physical Therapy  DAILY TREATMENT     [facility] Physical [address].  [address]  [place] [address]  Phone:  [phone]  Fax:  [phone]    Date: 12/05/2024    Patient: Deisy Gilbert  YOB: 1990  MRN: [id number]     Time Calculation    Start time: 0800  Stop time: 0844 Time Calculation (min): 44 minutes     Chief Complaint: Back Problem    Visit #: 3    SUBJECTIVE:  Deisy reports that she hasn't done her exercises much over the past week due to the Thanksgiving holiday. She has done some exercise on her own but has avoided things that involve back movement or strengthening because she wants to make sure she is doing it right and not worsening her symptoms.    OBJECTIVE:  Current objective measures: min verbal cueing required for TrA activation and lumbopelvic postural control.           Therapeutic Exercises (CPT 88658):     1. NuStep w/u, level 5, 6'    2. TrA + ball press in HL, x30+3\"    4. TrA + Hip extension c ball, x30+3\"    7. seated on PB + alt LAQ, 3'    8. bird dog, x20 ea    9. thread the needle EOP, x20 ea    10. hip hinge, with dowel    11. QP fire hydrant, 0#    12. squat lift, 5#    13. PB bridge      Therapeutic Exercise Summary: HEP: DKTC, LTR, SL open book, TrA activation, supine march, bridge      Time-based treatments/modalities:    Physical Therapy Timed Treatment Charges  Therapeutic exercise minutes (CPT 09043): 44 minutes    ASSESSMENT:   Response to treatment: Introduced lift mechanics for squat and DL. Deisy has never performed DL before and required mod-max cueing and use of dowel but improved throughout session. Squat to OH lift from table did not require cueing. We will review and consider adding resistance next session.    PLAN/RECOMMENDATIONS:   Plan for treatment: therapy treatment to continue next visit.  Planned interventions for next visit: continue with current treatment.         "

## 2024-12-06 ENCOUNTER — TELEPHONE (OUTPATIENT)
Dept: ENDOCRINOLOGY | Facility: MEDICAL CENTER | Age: 34
End: 2024-12-06
Payer: COMMERCIAL

## 2024-12-07 NOTE — TELEPHONE ENCOUNTER
Called Pt and reminded them of their appt on 12/18/24 and let them know they have labs due prior to their appt.    They said thank you and will get their labs done

## 2024-12-09 ENCOUNTER — HOSPITAL ENCOUNTER (OUTPATIENT)
Dept: LAB | Facility: MEDICAL CENTER | Age: 34
End: 2024-12-09
Attending: STUDENT IN AN ORGANIZED HEALTH CARE EDUCATION/TRAINING PROGRAM
Payer: COMMERCIAL

## 2024-12-09 DIAGNOSIS — E05.00 GRAVES DISEASE: ICD-10-CM

## 2024-12-09 PROCEDURE — 84439 ASSAY OF FREE THYROXINE: CPT

## 2024-12-09 PROCEDURE — 84445 ASSAY OF TSI GLOBULIN: CPT

## 2024-12-09 PROCEDURE — 36415 COLL VENOUS BLD VENIPUNCTURE: CPT

## 2024-12-09 PROCEDURE — 84480 ASSAY TRIIODOTHYRONINE (T3): CPT

## 2024-12-09 PROCEDURE — 83520 IMMUNOASSAY QUANT NOS NONAB: CPT

## 2024-12-09 PROCEDURE — 84443 ASSAY THYROID STIM HORMONE: CPT

## 2024-12-10 LAB
T3 SERPL-MCNC: 125 NG/DL (ref 60–181)
T4 FREE SERPL-MCNC: 1.07 NG/DL (ref 0.93–1.7)
TSH SERPL-ACNC: 2.79 UIU/ML (ref 0.35–5.5)

## 2024-12-11 LAB
TSH RECEP AB SER-ACNC: <1.1 IU/L
TSI SER-ACNC: 0.38 IU/L

## 2024-12-12 ENCOUNTER — PHYSICAL THERAPY (OUTPATIENT)
Dept: PHYSICAL THERAPY | Facility: REHABILITATION | Age: 34
End: 2024-12-12
Attending: STUDENT IN AN ORGANIZED HEALTH CARE EDUCATION/TRAINING PROGRAM
Payer: COMMERCIAL

## 2024-12-12 ENCOUNTER — TELEPHONE (OUTPATIENT)
Dept: CARDIOLOGY | Facility: MEDICAL CENTER | Age: 34
End: 2024-12-12

## 2024-12-12 DIAGNOSIS — M54.50 CHRONIC BILATERAL LOW BACK PAIN WITHOUT SCIATICA: ICD-10-CM

## 2024-12-12 DIAGNOSIS — G89.29 CHRONIC BILATERAL LOW BACK PAIN WITHOUT SCIATICA: ICD-10-CM

## 2024-12-12 PROCEDURE — 97110 THERAPEUTIC EXERCISES: CPT | Performed by: PHYSICAL THERAPIST

## 2024-12-12 PROCEDURE — 97140 MANUAL THERAPY 1/> REGIONS: CPT | Performed by: PHYSICAL THERAPIST

## 2024-12-12 NOTE — TELEPHONE ENCOUNTER
KARINA EOS to BE's nurse, Elina, on 12/12/2024    Preliminary findings:    Sinus Rhythm  with an avg rate of 77 bpm    Isolated SVE(s) and VE(s) were rare    24 recorded patient events associated with:  SR

## 2024-12-12 NOTE — OP THERAPY DAILY TREATMENT
"  Outpatient Physical Therapy  DAILY TREATMENT     [facility] Physical Therapy [address].  [address]  [place] [address]  Phone:  [phone]  Fax:  [phone]    Date: 12/12/2024    Patient: Deisy Gilbert  YOB: 1990  MRN: [id number]     Time Calculation    Start time: 0800  Stop time: 0845 Time Calculation (min): 45 minutes     Chief Complaint: Back Problem    Visit #: 4    SUBJECTIVE:  Deisy reports that her back is feeling sore today, which she thinks is partly because she woke up with a stomach ache. She wonders if it's possible that she has a kidney stone again, which she has had multiple times in the past.     OBJECTIVE:  Current objective measures: Concordant symptoms with palpation to R thoracic paraspinals          Therapeutic Exercises (CPT 48214):     1. NuStep w/u, level 5, 6'    2. TrA + ball press in HL, x30+3\"    3. HL dead bug c ball, x30+3\" ea    4. TrA + Hip extension c ball, x30+3\"    7. seated on PB + alt LAQ, 3', NT    8. bird dog, x20 ea    9. thread the needle EOP, x20 ea    10. hip hinge, with dowel, NT    11. QP fire hydrant, 0#, NT    12. squat lift, 5#, NT    13. PB bridge, NT    15. Lat pulldown, x30 purple TB    16. Lat stretch, 2' ea      Therapeutic Exercise Summary: HEP: DKTC, LTR, SL open book, TrA activation, supine march, bridge    Therapeutic Treatments and Modalities:     1. Manual Therapy (CPT 96859), Tx paraspinal MFR, lat MFR, T4-T8 gr 2 PA JAN    Time-based treatments/modalities:    Physical Therapy Timed Treatment Charges  Manual therapy minutes (CPT 12959): 10 minutes  Therapeutic exercise minutes (CPT 07215): 35 minutes    ASSESSMENT:   Response to treatment: Deisy presents with myofascial pain in R paraspinals. I modified today's treatment plan and provided edu for how to reduce discomfort and prevent it from worsening with continued exercise at the gym. Deisy will continue to benefit from skilled PT " intervention.    PLAN/RECOMMENDATIONS:   Plan for treatment: therapy treatment to continue next visit.  Planned interventions for next visit: continue with current treatment.

## 2024-12-13 RX ORDER — NORGESTREL AND ETHINYL ESTRADIOL 0.3-0.03MG
1 KIT ORAL DAILY
Qty: 84 TABLET | Refills: 4 | Status: SHIPPED | OUTPATIENT
Start: 2024-12-13

## 2024-12-13 NOTE — TELEPHONE ENCOUNTER
Received request via: Patient    Was the patient seen in the last year in this department? Yes    Does the patient have an active prescription (recently filled or refills available) for medication(s) requested? No    Does the patient have residential Plus and need 100-day supply? (This applies to ALL medications) Patient does not have SCP

## 2024-12-17 ENCOUNTER — PHYSICAL THERAPY (OUTPATIENT)
Dept: PHYSICAL THERAPY | Facility: REHABILITATION | Age: 34
End: 2024-12-17
Attending: STUDENT IN AN ORGANIZED HEALTH CARE EDUCATION/TRAINING PROGRAM
Payer: COMMERCIAL

## 2024-12-17 DIAGNOSIS — G89.29 CHRONIC BILATERAL LOW BACK PAIN WITHOUT SCIATICA: ICD-10-CM

## 2024-12-17 DIAGNOSIS — M54.50 CHRONIC BILATERAL LOW BACK PAIN WITHOUT SCIATICA: ICD-10-CM

## 2024-12-17 PROCEDURE — 97110 THERAPEUTIC EXERCISES: CPT | Performed by: PHYSICAL THERAPIST

## 2024-12-17 NOTE — OP THERAPY PROGRESS SUMMARY
Outpatient Physical Therapy  PROGRESS SUMMARY NOTE      [facility] Physical Therapy [address]  [address]  [place] [address]  Phone:  [phone]  Fax:  [phone]    Date of Visit: 12/17/2024    Patient: Deisy Gilbert  YOB: 1990  MRN: [id number]     Referring Provider: Nanci Lucero M.D.  [address] [facility] [address]  [place] [address]   Referring Diagnosis Other chronic pain [G89.29]     Visit Diagnoses     ICD-10-CM   1. Chronic bilateral low back pain without sciatica  M54.50    G89.29       Rehab Potential: good    Progress Report Period: 11/7/2024-12/17/2024    Functional Assessment Used          Objective Findings and Assessment:   Patient progression towards goals: Deisy reports that overall she feels like she is improving. She has still had good days and bad days but now when she has bad days, they only last for a few hours. She does feel the symptoms in the same place, they just aren't as intense and don't last.    Deisy reports that despite improvement, she is still modifying how she does activity to avoid making things worse. She is currently modifying activity to avoid heavy lifting and doing less in her gym routine to avoid making things worse.     Objective findings and assessment details: ASSESSMENT  Deisy has demonstrated good initial improvement in physical therapy, even in limited treatment visits (3x) since evaluation. She has gained strength and flexibility in relevant musculature and is already reporting mild subjective improvement. Deisy remains limited, especially since she has not returned to her regular gym routine yet, and will continue to benefit from skilled PT intervention to address her remaining deficits, enable return to full function at the gym for healthy exercise, and restore pain free return to full function at home and in the community.     OBJECTIVE    Strength:       Abdominals   Left: 4-  Right: 4-     Left Hip   Planes of  Motion   Flexion: 4  Extension: 3+  Abduction: 3  Adduction: 4-  External rotation: 3+  Internal rotation: 4     Right Hip   Planes of Motion   Flexion: 4  Extension: 3+  Abduction: 3  Adduction: 4-  External rotation: 3+  Internal rotation: 4     Left Knee   Flexion: 5  Extension: 5     Right Knee   Flexion: 5  Extension: 5    Goals:   Short Term Goals:   1. Patient will demonstrate independent regular performance of tailored home exercise program in order to facilitate recovery and promote self treatment and patient ownership of recovery  -- MET --  2. Patient will demo normal flexibility to bilateral hip flexor musculature to reduce pull into anterior pelvic tilt and reduce lumbar hyperlordosis during squat lift and other full body transitional movements  -- IN PROGRESS --  3. Patient will demo core strength >/= 4/5 (3+/5 at eval) in order to indicate strength needed to control and mitigate symptoms with performance of domestic duties and community participation.  -- IN PROGRESS --    Short term goal time span:  2-4 weeks      Long Term Goals:    1. Patient will demo bilateral hip abduction strength >/= 4-/5 (3/5 at eval) to improve sagittal plane stability during squat as well as single leg phase of gait to reduce QL compensation  -- IN PROGRESS --  2. Patient will demo bilateral hip extension strength >/= 4/5 (3+/5 at eval) to improve transfer and lift mechanics and reduce lumbar strain  -- IN PROGRESS --  3. Patient will self report </= 12% disability via Oswestry functional assessment questionnaire (26% at eval) -- IN PROGRESS --    Long term goal time span:  6-8 weeks    Plan:   Planned therapy interventions:  Neuromuscular Re-education (CPT 29855), Therapeutic Exercise (CPT 98699), Therapeutic Activities (CPT 39905) and Manual Therapy (CPT 35349)  Frequency:  2x week      Referring provider co-signature:  I have reviewed this plan of care and my co-signature certifies the need for services.      Certification Period: 12/17/2024 to 01/14/25    Physician Signature: ________________________________ Date: ______________

## 2024-12-17 NOTE — OP THERAPY DAILY TREATMENT
"  Outpatient Physical Therapy  DAILY TREATMENT     [facility] Physical [address].  [address]  [place] [address]  Phone:  [phone]  Fax:  [phone]    Date: 12/17/2024    Patient: Deisy Gilbert  YOB: 1990  MRN: [id number]     Time Calculation    Start time: 1345  Stop time: 1430 Time Calculation (min): 45 minutes     Chief Complaint: Back Problem    Visit #: 5    SUBJECTIVE:  Please see NH    OBJECTIVE:  Current objective measures: Please see NH          Therapeutic Exercises (CPT 92431):     1. NuStep w/u, level 5, 6'    2. TrA + ball press in HL, x30+3\"    3. HL dead bug c ball, x30+3\" ea    4. TrA + Hip extension c ball, x30+3\"    7. seated on PB + alt LAQ, 3', NT    8. bird dog, x20 ea    9. thread the needle EOP, x20 ea    10. hip hinge, with dowel, NT    11. QP fire hydrant, 0#, NT    12. squat lift, 5#, NT    13. PB bridge, NT    17. hip flexor stretch, 2' ea    18. stagger stance sit down    19. figure 4 bridge    20. MB squat lift      Therapeutic Exercise Summary: HEP: DKTC, LTR, SL open book, TrA activation, supine march, bridge    UPDATED HEP (12/17/2024): SL open book, SL hip abd --> band, stagger stance sit to stand, figure 4 bridge, hip flexor stretch, MB squat lift    Therapeutic Treatments and Modalities:     1. Manual Therapy (CPT 10154), Tx paraspinal MFR, lat MFR, T4-T8 gr 2 PA JM    Time-based treatments/modalities:    Physical Therapy Timed Treatment Charges  Therapeutic exercise minutes (CPT 85396): 45 minutes    ASSESSMENT:   Response to treatment: Please see NH    PLAN/RECOMMENDATIONS:   Plan for treatment: therapy treatment to continue next visit.  Planned interventions for next visit: continue with current treatment.         "

## 2024-12-17 NOTE — PROGRESS NOTES
"Follow up Endocrinology Visit  Initial consult/last visit on: 9/7/23  Last visit on: 8/21/24  Referred by:  Nanci Lucero M.D.    Chief complaint:  Deisy Gilbert, is a very pleasant 34 y.o.female, who is here for follow up for Graves disease    Interval history:   - overall doing well  - started heavy lifting to lose weight, noted menstrual cycle irregularities  - still has some itchiness and watering of her L eye, otherwise eyes are not bothering her  - reviewed recent labs  Prior notes:  - since last visit feeling much better: tremor stopped, muscle weakness resolved, able to squat without any problem, currently using cane intermittently with back pains, which she associates with kidney stones  - diarrhea improved, but not resolved, patient asked for referral to GI specialist from PCP  - reports new right eye periorbital edema, gritty sensation, light sensitivity, ptosis, intermittent blurry vision, scheduled with neuro-ophthalmologist on 10/17/2023  - plans for surgical kidney stones removal on 10/18/2023  - reviewed with the patient new labs and thyroid US  11/09/23  - overall doing much better, however still feels very tired,  despite sleeping well  - gained couple lb since last visit  - still has diarrhea, even it much improved since initiation Rx with MMI; plans for GI evaluation  - had surgery for kidney stone removal, unfortunately, kidney stone was no evaluated for composition  - still has R eye ptosis and tearing, myasthenia gravis was ruled out  - followed by neuro-ophthalmologist, for now recommended active surveillance  - recent labs were reviewed  2/09/24  - patient developed disturbing knee pains, noted elevated MEGHAN, was seen by rheumatologist, there is a concern of possible MMI-induced SLE  - patient continues to feel fatigue  - eye \"are getting better\", she still feels gritty  sensation, uses artifical tears, denies blurry/double vision   4/12/24  - patient felt better after increasing dose of " "MMI, but later on feeling more fatigued, reports \"brain fog\"  - continues to have some L knee pain, follows rheumatologist who does not believe patient has MMI induced SLE  - noted blurry vision  6/24/24  - overall doing well  - eye symptoms improved but still sometimes has intermittent swelling; joint pain improved  - she also reports fatigue, not able to do hikes she was doing prior Dx of Graves disease  - she recently had 2 ED visits - 1st with palpitations, SBP up to 220 mmHg, second with migraine and elevated BP, she admits having panic attacks 15 years ago but she does not believe it was it  - she was measuring her BP at home, and it was < 140/85 mmHg  - her PCP is on vacation, she was able to schedule follow up appointment only in 9/2024  - reviewed recent labs  8/21/24  - did a sleep study 2 mo ago => started on CPAP 1 mo ago -> causing worsening of eye dryness  - with diet and exercise was able to lose weight  - reports morning periorbital edema  - reviewed recent labs    Hyperthyroidism HPI:  - was feeling tired for many months  - she lost 11 lb but associated with recent loss of her friend  - since beginning of 8/2023 patient noted nausea, diarrhea (having up to 4 BMS/day, watery stools), later on she noted palpitations  - she also noted worsening of her anxiety, reports  insomnia, sweating/hot flashes, heaviness on her chest, hand tremors, muscle weakness to the point that she needs to use cane to walk - persistent  - on labs from 8/23/23 - biochemical evidence of hyperthyroidism + elevated TrAbs  - was started on MMI 30 mg/day + propranolol 10 mg TID - no improvement of symptoms so far  - nonsmoker  - Fhx - thyroid disease in her aunt, not sure about the diagnosis  ALEJANDRO symptoms:  Dry, gritty eye  sensation - in R eye  Light sensitivity - in R eye  Tearing - no  Red eye - no  Proptosis -  R eye  Pain or pressure behind the eye - pressure behind R eye  Retroorbital pain - no  Pain with eye movement  - no " "  Eyelid retraction -  no  Eyelid swelling - R eye  Discomfort or pain in or behind the eye with looking L/R or up/down - no  Double vision - no  Blurry vision - intermittently  Color vision loss - no  Vision loss - no    Medications:  Current Outpatient Medications:     LOW-OGESTREL 0.3-30 MG-MCG Tab, TAKE 1 TABLET BY MOUTH EVERY DAY, Disp: 84 Tablet, Rfl: 4    cyclobenzaprine (FLEXERIL) 5 mg tablet, TAKE 1 TABLET BY MOUTH 2 TIMES A DAY AS NEEDED FOR MUSCLE SPASMS OR MODERATE PAIN., Disp: 30 Tablet, Rfl: 0    Cyanocobalamin (VITAMIN B 12 PO), , Disp: , Rfl:     propranolol (INDERAL) 10 MG Tab, Take 1 Tablet by mouth 3 times a day., Disp: , Rfl:     SUMAtriptan (IMITREX) 50 MG Tab, Take 1 Tablet by mouth one time as needed for Migraine for up to 1 dose., Disp: 10 Tablet, Rfl: 3    Magnesium 200 MG Tab, Take 200 mg by mouth every day., Disp: , Rfl:     diclofenac sodium (VOLTAREN) 1 % Gel, Apply 2 g topically 4 times a day as needed (back pain)., Disp: 50 g, Rfl: 0    ciclopirox (PENLAC) 8 % solution, Apply evenly over entire nail plate nightly to all affected nails., Disp: 6 mL, Rfl: 3    Vitamin D, Cholecalciferol, (CHOLECALCIFEROL) 25 MCG (1000 UT) Tab, Take 1,000 Units by mouth every day., Disp: , Rfl:     methimazole (TAPAZOLE) 10 MG Tab, Take 1.5 Tablets by mouth every day., Disp: 140 Tablet, Rfl: 4    Potassium Citrate 15 MEQ (1620 MG) Tab CR, , Disp: , Rfl:     Physical Examination:   Vital signs: /69   Pulse 74   Ht 1.549 m (5' 1\")   Wt 75.8 kg (167 lb)   SpO2 95%   BMI 31.55 kg/m²   General: No distress, cooperative, well dressed and well nourished.   Resp: Normal effort.  CVS: Regular rate and rhythm.  Extremities: No edema bilateral extremities  Neuro: Alert and oriented.   Skin: No rash, No Ulcers  Psych: Normal mood and affect    Labs:   Reference Range  08/22/23  08/23/23  09/29/23 11/06/23 02/05/24 02/07/24 04/06/24 04/26/24 06/04/24 06/17/24 8/9/24 12/09/24    TSH 0.380 - 5.330 " uIU/mL <0.005 (L) <0.005 (L) <0.005 (L) <0.005 (L) <0.005 (L)  3.180 3.250 3.760 3.090 4.180 2.790    fT4 0.93 - 1.70 ng/dL 5.20 (H) 5.87 (H) 0.98 1.56 1.22  0.90 (L) 0.94  1.11 1.11 1.07    T3 60.0 - 181.0 ng/dL   165.0    149.0     125.0    fT3 2.00 - 4.40 pg/mL  19.90 (H)              TSI <=0.54 IU/L             0.36    TrAbs <=1.75 IU/L  9.72 (H)          < 1.10    TPO- Abs 0.0 - 9.0 IU/mL      246.0 (H)          Tg- Abs  0.0 - 4.0 IU/mL      1.0          MMI mg  30 10 10 10 15 15 10 10 10 10 10 5     Pheochromocytoma work up:   Reference Range  08/09/24   Metanephrine Plasma 0.00 - 0.49 nmol/L <0.10   Normetanephrine Plasma 0.00 - 0.89 nmol/L 0.27     Primary hyperaldosteronism work up:   Reference Range 08/09/24   Potassium 3.6 - 5.5 mmol/L 4.2   Aldos Serum ng/dL 18.2   Renin Activity ng/mL/hr 2.1     Adrenal insufficiency work up:   Reference Range 08/09/24    Cortisol 0.0 - 23.0 ug/dL 20.3       Imaging:  - reviewed  Thyroid US on 10/2/2023:  HISTORY/REASON FOR EXAM:  Hyperthyroidism/Graves disease, thyroid gland enlargement.  TECHNIQUE/EXAM DESCRIPTION:  Ultrasound of the soft tissues of the head and neck.  COMPARISON:  None  FINDINGS:  The thyroid gland is heterogeneous.  Vascularity is increased.  The right lobe of the thyroid gland measures 2.63 cm x 4.56 cm x 2.41 cm. The contour and echogenicity are normal. No focal mass lesions are identified.  The left lobe of the thyroid gland measures 2.08 cm x 4.21 cm x 1.73 cm. The contour and echogenicity are normal. No focal mass lesions are identified.  The isthmus measures 0.54 cm.  IMPRESSION:  1.  Mildly enlarged heterogeneous thyroid gland with hyperemia. Findings are consistent with Graves' disease.    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 - periodic hypokalemic paralysis and thyrotoxic periodic  paralysis are unlikely given persistent nature of muscle weakness vs myopathic attacks, already on propranolol - treatment for thyrotoxic periodic paralysis  - appropriately started on MMI and nonselective beta-blocker  - we discussed cause and natural history of the Graves disease, treatment approaches  - I explained that it takes up to 3-4 weeks to reach euthyroidism  - clinical and biochemical improvement of hyperthyroidism with normalization of free T4, TSH is still suppressed but likely is lagging in response  - we will decrease MMI to maintenance dose of 10 mg a day  - patient continues to have diarrhea, unlikely related to hyperthyroidism, celiac disease work-up was negative, agree with PCP with GI referral  - new symptoms consistent with right thyroid eye disease, referred to neuro-ophthalmologist  2/09/23  - on maintenance dose of MMI, fT4 wnl, TSH is still suppressed - likely indicates that patient needs higher dose of MMI  - concerns of MMI- induced SLE, follows rheumatologist  - had extensive discussion of possible approaches, there is a high risk of having SLE even with transition to PTU - will discuss with rheumatologist if that is reasonable, if neither of thionamides could be used -> consider surgical therapy, as GIRON might exacerbate ALEJANDRO  - discussed consequences of thyroidectomy and principles of replacement therapy   - patient will continue evaluation by rheumatology, will update me about plans, if  "transition to PTU is reasonable, if continues to take MMI -> consider increase the dose to 15 mg/day.   - ALEJANDRO improved  4/12/24  - improvement of symptoms after increasing dose of MMI  during the last visit, but then feels more fatigued, \"brain fogged\"  - on labs iatrogenic hypothyroidism  - will taper down MMI 15 -> 10 mg  - repeat fT4 in 2 weeks, TSH in 2 mo  - patient noted worsening of ALEJANDRO symptoms, eye exam unremarkable but patient reports blurry vision with down gaze, plans to see neuroophthalmologist  6/24/24  - euthyroid on MMI 10 mg/day  - recent palpitations, BP elevation could not be explained by thyroid issues  8/21/24  - already 1 year on MMI, euthyroid on MMI 10 mg/day  - continue current management, repeat TFTs and check TSI/TrAbs in 4 mo    Plan:  Decrease MMI 10 -> 5 mg/day  Repeat TFTs in 3 mo.   Follow neuro-ophthalmologist recommendations.  Meanwhile, use sunglasses, artificial teardrops, avoid primary and secondary smoking    # LANDY, started on CPAP  Prior notes:  8/21/24  - new diagnosis  - could be contributory to the fatigue  - continue management as per sleep medicine    # Irregular periods  - new problem  - can not be explained by thyroid pathology as she is currently euthyroid  - could be related to intensive physical activity  Plan:  Consider decrease intensity of physical activity  Evaluation by ObGYN  Obtain prolactin level.     RTC: 3 mo  Total time (face-to-face and non-face-to face time):  30 min  Plan reviewed with the patient and agreed with plan.  All questions answered to patient's satisfaction.  Thank you kindly for allowing me to participate in the care plan for this patient.    Kamilah Danielson MD    CC:   Nanci Lucero M.D.  "

## 2024-12-18 ENCOUNTER — OFFICE VISIT (OUTPATIENT)
Dept: ENDOCRINOLOGY | Facility: MEDICAL CENTER | Age: 34
End: 2024-12-18
Attending: STUDENT IN AN ORGANIZED HEALTH CARE EDUCATION/TRAINING PROGRAM
Payer: COMMERCIAL

## 2024-12-18 VITALS
SYSTOLIC BLOOD PRESSURE: 135 MMHG | HEART RATE: 74 BPM | BODY MASS INDEX: 31.53 KG/M2 | WEIGHT: 167 LBS | DIASTOLIC BLOOD PRESSURE: 69 MMHG | HEIGHT: 61 IN | OXYGEN SATURATION: 95 %

## 2024-12-18 DIAGNOSIS — E05.00 GRAVES DISEASE: ICD-10-CM

## 2024-12-18 DIAGNOSIS — E05.90 HYPERTHYROIDISM: ICD-10-CM

## 2024-12-18 DIAGNOSIS — N92.6 IRREGULAR PERIODS: ICD-10-CM

## 2024-12-18 PROCEDURE — 99211 OFF/OP EST MAY X REQ PHY/QHP: CPT | Performed by: STUDENT IN AN ORGANIZED HEALTH CARE EDUCATION/TRAINING PROGRAM

## 2024-12-18 PROCEDURE — 99214 OFFICE O/P EST MOD 30 MIN: CPT | Performed by: STUDENT IN AN ORGANIZED HEALTH CARE EDUCATION/TRAINING PROGRAM

## 2024-12-18 PROCEDURE — 3075F SYST BP GE 130 - 139MM HG: CPT | Performed by: STUDENT IN AN ORGANIZED HEALTH CARE EDUCATION/TRAINING PROGRAM

## 2024-12-18 PROCEDURE — 3078F DIAST BP <80 MM HG: CPT | Performed by: STUDENT IN AN ORGANIZED HEALTH CARE EDUCATION/TRAINING PROGRAM

## 2024-12-18 RX ORDER — METHIMAZOLE 5 MG/1
5 TABLET ORAL 3 TIMES DAILY
Qty: 90 TABLET | Refills: 5 | Status: SHIPPED | OUTPATIENT
Start: 2024-12-18

## 2024-12-18 ASSESSMENT — FIBROSIS 4 INDEX: FIB4 SCORE: 0.5

## 2024-12-24 DIAGNOSIS — E05.00 GRAVES DISEASE: ICD-10-CM

## 2024-12-26 ENCOUNTER — OFFICE VISIT (OUTPATIENT)
Dept: SLEEP MEDICINE | Facility: MEDICAL CENTER | Age: 34
End: 2024-12-26
Attending: NURSE PRACTITIONER
Payer: COMMERCIAL

## 2024-12-26 VITALS
BODY MASS INDEX: 31.08 KG/M2 | HEART RATE: 86 BPM | HEIGHT: 61 IN | WEIGHT: 164.6 LBS | SYSTOLIC BLOOD PRESSURE: 122 MMHG | OXYGEN SATURATION: 95 % | RESPIRATION RATE: 16 BRPM | DIASTOLIC BLOOD PRESSURE: 72 MMHG

## 2024-12-26 DIAGNOSIS — G47.33 OSA ON CPAP: ICD-10-CM

## 2024-12-26 PROCEDURE — 99214 OFFICE O/P EST MOD 30 MIN: CPT | Performed by: NURSE PRACTITIONER

## 2024-12-26 PROCEDURE — 99213 OFFICE O/P EST LOW 20 MIN: CPT | Performed by: NURSE PRACTITIONER

## 2024-12-26 PROCEDURE — 3074F SYST BP LT 130 MM HG: CPT | Performed by: NURSE PRACTITIONER

## 2024-12-26 PROCEDURE — 3078F DIAST BP <80 MM HG: CPT | Performed by: NURSE PRACTITIONER

## 2024-12-26 ASSESSMENT — FIBROSIS 4 INDEX: FIB4 SCORE: 0.5

## 2024-12-26 NOTE — PROGRESS NOTES
Chief Complaint   Patient presents with    Follow-Up     Last Office Visit 10/31/2024 with Eriberto POND   auto CPAP to 8.6 to 12 cm/H2O.         HPI:  Deisy Gilbert is a 34 y.o. year old female here today for follow-up on LANDY.  Last seen 10/31/2024 by me.  Previous home sleep study done on 5/31/2024 showed evidence of mild to moderate LANDY with an AHI of 14 with an RDI of 19.8. O2 karen was 86% with 0.3 minutes less than or equal to 88% SpO2. Order was placed for auto CPAP.  Currently using an N20 nasal mask.  Has tried multiple other mask and finds that this mask is more comfortable and does not leak as much as previous.  She was set up on CPAP 161 days ago.  Has had difficulty getting used to the device and has had increased travel recently in which she did not bring CPAP device.  With CPAP use notes more restful sleep and denies any excessive daytime sleepiness, morning headaches, palpitations, concentration or memory problems.    30-day compliance shows 57% use with an average time of 3 hours and 45 minutes and residual AHI of 0.4.      ROS: As per HPI and otherwise negative if not stated.    Past Medical History:   Diagnosis Date    Abdominal pain     Back pain     Back pain     Blood in urine     Daytime sleepiness     Depression     Diarrhea     Fatigue     Frequent headaches     Gasping for breath     Graves disease 08/18/2023    Heartburn     History of kidney stones     Hyperlipidemia     Hypertension     Hyperthyroidism     Insomnia     Irregular periods     Morning headache     Nasal drainage     Nausea     Palpitations     Sleep apnea     Sweat, sweating, excessive     Weakness     Wears glasses     Weight loss        Past Surgical History:   Procedure Laterality Date    STENT PLACEMENT  10/2023    CYSTOSCOPY STENT PLACEMENT  2017       Family History   Problem Relation Age of Onset    Breast Cancer Mother     Hypertension Mother     Eczema Father     Hypertension Maternal Grandmother     Breast  "Cancer Paternal Aunt     Breast Cancer Paternal Aunt     No Known Problems Brother     Diabetes Paternal Uncle        Allergies as of 12/26/2024 - Reviewed 12/26/2024   Allergen Reaction Noted    Lactose  08/23/2022        Vitals:  /72 (BP Location: Right arm, Patient Position: Sitting, BP Cuff Size: Adult)   Pulse 86   Resp 16   Ht 1.549 m (5' 1\")   Wt 74.7 kg (164 lb 9.6 oz)   SpO2 95%     Current medications as of today   Current Outpatient Medications   Medication Sig Dispense Refill    methimazole (TAPAZOLE) 5 MG Tab Take 1 Tablet by mouth 3 times a day. 90 Tablet 5    LOW-OGESTREL 0.3-30 MG-MCG Tab TAKE 1 TABLET BY MOUTH EVERY DAY 84 Tablet 4    cyclobenzaprine (FLEXERIL) 5 mg tablet TAKE 1 TABLET BY MOUTH 2 TIMES A DAY AS NEEDED FOR MUSCLE SPASMS OR MODERATE PAIN. 30 Tablet 0    Cyanocobalamin (VITAMIN B 12 PO)       propranolol (INDERAL) 10 MG Tab Take 1 Tablet by mouth 3 times a day.      SUMAtriptan (IMITREX) 50 MG Tab Take 1 Tablet by mouth one time as needed for Migraine for up to 1 dose. 10 Tablet 3    Magnesium 200 MG Tab Take 200 mg by mouth every day.      diclofenac sodium (VOLTAREN) 1 % Gel Apply 2 g topically 4 times a day as needed (back pain). 50 g 0    ciclopirox (PENLAC) 8 % solution Apply evenly over entire nail plate nightly to all affected nails. 6 mL 3    Vitamin D, Cholecalciferol, (CHOLECALCIFEROL) 25 MCG (1000 UT) Tab Take 1,000 Units by mouth every day.      Potassium Citrate 15 MEQ (1620 MG) Tab CR        No current facility-administered medications for this visit.         Physical Exam:   Gen:           Alert and oriented, No apparent distress. Mood and affect appropriate, normal interaction with examiner.  Eyes:          PERRL, EOM intact, sclere white, conjunctive moist.  Ears:          Not examined.   Hearing:     Grossly intact.  Nose:          Normal, no lesions or deformities.  Dentition:    Good dentition.  Oropharynx:   Tongue normal, posterior pharynx without " erythema or exudate.  Neck:        Supple, trachea midline, no masses.  Respiratory Effort: No intercostal retractions or use of accessory muscles.   Lung Auscultation:      Clear to auscultation bilaterally; no rales, rhonchi or wheezing.  CV:            Regular rate and rhythm. No murmurs, rubs or gallops.  Abd:           Not examined.   Lymphadenopathy: Not examined.  Gait and Station: Normal.  Digits and Nails: No clubbing, cyanosis, petechiae, or nodes.   Cranial Nerves: II-XII grossly intact.  Skin:        No rashes, lesions or ulcers noted.               Ext:           No cyanosis or edema.      Assessment:  1. LANDY on CPAP          Plan:  Not yet compliant.  Advised nightly use for 4 hours for optimal benefit.  Patient has had difficulty with adjusting to CPAP due to mask problems.  Now has a comfortable mask.  Recently returned from vacation.  Advised nightly use for 4 hours for optimal benefit.  Patient to come back in 3 months for compliance recheck.      Please note that this dictation was created using voice recognition software. I have made every reasonable attempt to correct obvious errors, but it is possible there are errors of grammar and possibly content that I did not discover before finalizing the note.

## 2024-12-31 ENCOUNTER — PHYSICAL THERAPY (OUTPATIENT)
Dept: PHYSICAL THERAPY | Facility: REHABILITATION | Age: 34
End: 2024-12-31
Attending: STUDENT IN AN ORGANIZED HEALTH CARE EDUCATION/TRAINING PROGRAM
Payer: COMMERCIAL

## 2024-12-31 DIAGNOSIS — M54.50 CHRONIC BILATERAL LOW BACK PAIN WITHOUT SCIATICA: ICD-10-CM

## 2024-12-31 DIAGNOSIS — G89.29 CHRONIC BILATERAL LOW BACK PAIN WITHOUT SCIATICA: ICD-10-CM

## 2024-12-31 PROCEDURE — 97110 THERAPEUTIC EXERCISES: CPT | Performed by: PHYSICAL THERAPIST

## 2024-12-31 NOTE — OP THERAPY DAILY TREATMENT
"  Outpatient Physical Therapy  DAILY TREATMENT     [facility] [address].  [address]  [place] [address]  Phone:  [phone]  Fax:  [phone]    Date: 12/31/2024    Patient: Deisy Gilbert  YOB: 1990  MRN: [id number]     Time Calculation    Start time: 1100  Stop time: 1145 Time Calculation (min): 45 minutes         Chief Complaint: No chief complaint on file.    Visit #: 6    SUBJECTIVE:  Deisy reports that she has kept up with her home exercise and has been feeling some improvement with PT. She hasn't had as much back discomfort lately.    OBJECTIVE:  Current objective measures: Pain reported in R mild and low trap after row with trunk rotation.          Therapeutic Exercises (CPT 46493):     1. NuStep w/u, level 5, 6'    2. TrA + ball press in HL, x30+3\", NT    3. HL dead bug c ball, x30+3\" ea, NT    4. TrA + Hip extension c ball, x30+3\"    5. Prone series, prone T, goal post    7. seated on PB + alt LAQ, 3', NT    8. bird dog, x20 ea    9. QP thread the needle, x20 ea    10. hip hinge, with dowel, NT    11. QP fire hydrant, 0#, NT    12. squat lift, 5#, NT    13. PB bridge, NT    14. scap protraction punch + trunk rotation    15. low row + trunk rotation    17. hip flexor stretch, 2' ea    18. stagger stance sit down    19. figure 4 bridge    20. MB squat lift      Therapeutic Exercise Summary: HEP: DKTC, LTR, SL open book, TrA activation, supine march, bridge    UPDATED HEP (12/17/2024): SL open book, SL hip abd --> band, stagger stance sit to stand, figure 4 bridge, hip flexor stretch, MB squat lift    Therapeutic Treatments and Modalities:     1. Manual Therapy (CPT 61920), Tx paraspinal MFR, lat MFR, T4-T8 gr 2 PA JM    Time-based treatments/modalities:    Physical Therapy Timed Treatment Charges  Therapeutic exercise minutes (CPT 01326): 45 minutes      ASSESSMENT:   Response to treatment: Deisy continues to demonstrate good overall improvement in " physical therapy. She will benefit from continued skilled PT intervention to address her remaining deficits. Modified today's POC to incorporate light muscle activation and stretching for low and mid trap to alleviate reported discomfort    PLAN/RECOMMENDATIONS:   Plan for treatment: therapy treatment to continue next visit.  Planned interventions for next visit: continue with current treatment.

## 2025-01-07 ENCOUNTER — PHYSICAL THERAPY (OUTPATIENT)
Dept: PHYSICAL THERAPY | Facility: REHABILITATION | Age: 35
End: 2025-01-07
Attending: STUDENT IN AN ORGANIZED HEALTH CARE EDUCATION/TRAINING PROGRAM
Payer: COMMERCIAL

## 2025-01-07 DIAGNOSIS — G89.29 CHRONIC BILATERAL LOW BACK PAIN WITHOUT SCIATICA: ICD-10-CM

## 2025-01-07 DIAGNOSIS — M54.50 CHRONIC BILATERAL LOW BACK PAIN WITHOUT SCIATICA: ICD-10-CM

## 2025-01-07 PROCEDURE — 97110 THERAPEUTIC EXERCISES: CPT | Performed by: PHYSICAL THERAPIST

## 2025-01-07 PROCEDURE — 97140 MANUAL THERAPY 1/> REGIONS: CPT | Performed by: PHYSICAL THERAPIST

## 2025-01-07 NOTE — OP THERAPY DAILY TREATMENT
Outpatient Physical Therapy  DAILY TREATMENT     [facility] [address].  [address]  [place] [address]  Phone:  [phone]  Fax:  [phone]    Date: 01/07/2025    Patient: Deisy Gilbert  YOB: 1990  MRN: [id number]     Time Calculation    Start time: 1430  Stop time: 1525 Time Calculation (min): 55 minutes     Chief Complaint: Back Problem    Visit #: 7    SUBJECTIVE:  Deisy reports that her side in her low back was bothering her more after her upper body workout this morning. She wants to make sure that she is getting better and not worse. Patient reports that she was cleaning a lot yesterday and used her back for that, which may have impacted things    OBJECTIVE:  Current objective measures: Min-mod TTP to R external oblique, erector spinae, distal lat, and low trap          Therapeutic Exercises (CPT 29527):     1. NuStep w/u, level 5, 6'    2. tripod trunk rotation, x20 ea    3. QP hip airplane plus IR/ER, NT    5. Prone series, prone T, goal post, 2# DB 2x to fatigue    6. superman, reproduced concordant symptoms to 7/10, relived on cessation    7. prone    8. bird dog, x20 ea    9. QP thread the needle, x20 ea    10. hip hinge, with dowel, NT    11. QP fire hydrant, 0#, NT    12. squat lift, 5#, NT    13. PB bridge, NT    14. scap protraction punch + trunk rotation    15. low row + trunk rotation    16. D2 flexion, green TB x1, pink TB x1 to fatigue    17. seated PB rollout for diagonal LB stretch    18. stagger stance sit down    19. figure 4 bridge    20. MB squat lift      Therapeutic Exercise Summary: HEP: DKTC, LTR, SL open book, TrA activation, supine march, bridge    UPDATED HEP (12/17/2024): SL open book, SL hip abd --> band, stagger stance sit to stand, figure 4 bridge, hip flexor stretch, MB squat lift    Therapeutic Treatments and Modalities:     1. Manual Therapy (CPT 08442), Tx paraspinal MFR, lat MFR, T4-T8 gr 2 PA JM    Time-based  treatments/modalities:    Physical Therapy Timed Treatment Charges  Manual therapy minutes (CPT 62683): 10 minutes  Therapeutic exercise minutes (CPT 35041): 45 minutes    ASSESSMENT:   Response to treatment: Deisy continues to present with symptoms consistent with myofascial strain. I spent time during today's session reviewing the nature of pain and outlining approach for Deisy when she is at the gym (work up to mild pain then let it rest before progressing). She will continue to benefit from skilled PT intervention.    PLAN/RECOMMENDATIONS:   Plan for treatment: therapy treatment to continue next visit.  Planned interventions for next visit: continue with current treatment.

## 2025-01-09 ENCOUNTER — APPOINTMENT (OUTPATIENT)
Dept: PHYSICAL THERAPY | Facility: REHABILITATION | Age: 35
End: 2025-01-09
Attending: STUDENT IN AN ORGANIZED HEALTH CARE EDUCATION/TRAINING PROGRAM
Payer: COMMERCIAL

## 2025-01-16 ENCOUNTER — APPOINTMENT (OUTPATIENT)
Dept: PHYSICAL THERAPY | Facility: REHABILITATION | Age: 35
End: 2025-01-16
Attending: STUDENT IN AN ORGANIZED HEALTH CARE EDUCATION/TRAINING PROGRAM
Payer: COMMERCIAL

## 2025-01-16 DIAGNOSIS — M54.50 CHRONIC BILATERAL LOW BACK PAIN WITHOUT SCIATICA: ICD-10-CM

## 2025-01-16 DIAGNOSIS — G89.29 CHRONIC BILATERAL LOW BACK PAIN WITHOUT SCIATICA: ICD-10-CM

## 2025-01-16 PROCEDURE — 97110 THERAPEUTIC EXERCISES: CPT | Performed by: PHYSICAL THERAPIST

## 2025-01-16 PROCEDURE — 97140 MANUAL THERAPY 1/> REGIONS: CPT | Performed by: PHYSICAL THERAPIST

## 2025-01-16 NOTE — OP THERAPY DAILY TREATMENT
Outpatient Physical Therapy  DAILY TREATMENT     [facility] [address].  [address]  [place] [address]  Phone:  [phone]  Fax:  [phone]    Date: 01/16/2025    Patient: Deisy Gilbert  YOB: 1990  MRN: [id number]     Time Calculation    Start time: 0800  Stop time: 0845 Time Calculation (min): 45 minutes     Chief Complaint: Back Problem    Visit #: 8    SUBJECTIVE:  Deisy reports that her back was bothering her more the last couple of days. She thinks this is partly because of her period starting, which often really bothers her. She also has been working 3 jobs which has been hard on her. The 3rd job will be ending in about 2 weeks.    OBJECTIVE:  Current objective measures: Mild discomfort in L flank with plank exercises          Therapeutic Exercises (CPT 45745):     1. NuStep w/u, level 5, 6'    2. tripod trunk rotation, x20 ea    3. QP hip airplane plus IR/ER, NT    5. Prone series, prone T, goal post, 2# DB 2x to fatigue    6. superman, reproduced concordant symptoms to 7/10, relived on cessation    7. prone plank, 2x to fatigue, mild L LB discomfort after    8. bird dog, x20 ea, NT    9. QP thread the needle, x20 ea, NT    10. dead lift    11. QP fire hydrant, 0#, NT    12. squat lift, 5#, NT    13. PB bridge, NT    14. scap protraction punch + trunk rotation, NT    15. low row + trunk rotation, NT    16. D2 flexion, green TB x1, pink TB x1 to fatigue    17. Dead lift, form coaching and DL with 65#    18. stagger stance sit down    19. figure 4 bridge    20. MB squat lift      Therapeutic Exercise Summary: HEP: DKTC, LTR, SL open book, TrA activation, supine march, bridge    UPDATED HEP (12/17/2024): SL open book, SL hip abd --> band, stagger stance sit to stand, figure 4 bridge, hip flexor stretch, MB squat lift    Therapeutic Treatments and Modalities:     1. Manual Therapy (CPT 94678), Tx paraspinal MFR, lat MFR, T4-T8 gr 2 PA JM    Time-based  treatments/modalities:    Physical Therapy Timed Treatment Charges  Manual therapy minutes (CPT 32208): 10 minutes  Therapeutic exercise minutes (CPT 29650): 35 minutes    ASSESSMENT:   Response to treatment: Utilized stretching, joint mobilization and regional MFR to reduce discomfort after plank exercises with mild improvement within session. Introduced deadlift form coaching and then DL with 65#. Mild cueing required for correct mechanics with tendency to squat rather than DL. No increase in pain.    PLAN/RECOMMENDATIONS:   Plan for treatment: therapy treatment to continue next visit.  Planned interventions for next visit: continue with current treatment.

## 2025-01-23 ENCOUNTER — APPOINTMENT (OUTPATIENT)
Dept: PHYSICAL THERAPY | Facility: REHABILITATION | Age: 35
End: 2025-01-23
Attending: STUDENT IN AN ORGANIZED HEALTH CARE EDUCATION/TRAINING PROGRAM
Payer: COMMERCIAL

## 2025-02-06 ENCOUNTER — APPOINTMENT (OUTPATIENT)
Dept: PHYSICAL THERAPY | Facility: REHABILITATION | Age: 35
End: 2025-02-06
Attending: STUDENT IN AN ORGANIZED HEALTH CARE EDUCATION/TRAINING PROGRAM
Payer: COMMERCIAL

## 2025-02-07 ENCOUNTER — PATIENT MESSAGE (OUTPATIENT)
Dept: MEDICAL GROUP | Facility: MEDICAL CENTER | Age: 35
End: 2025-02-07

## 2025-02-07 ENCOUNTER — PHYSICAL THERAPY (OUTPATIENT)
Dept: PHYSICAL THERAPY | Facility: REHABILITATION | Age: 35
End: 2025-02-07
Attending: STUDENT IN AN ORGANIZED HEALTH CARE EDUCATION/TRAINING PROGRAM
Payer: COMMERCIAL

## 2025-02-07 DIAGNOSIS — M54.50 CHRONIC BILATERAL LOW BACK PAIN WITHOUT SCIATICA: ICD-10-CM

## 2025-02-07 DIAGNOSIS — G89.29 CHRONIC BILATERAL LOW BACK PAIN WITHOUT SCIATICA: ICD-10-CM

## 2025-02-07 PROCEDURE — 97110 THERAPEUTIC EXERCISES: CPT | Performed by: PHYSICAL THERAPIST

## 2025-02-08 NOTE — OP THERAPY PROGRESS SUMMARY
Outpatient Physical Therapy  PROGRESS SUMMARY NOTE      [facility] [address].  [address]  [place] [address]  Phone:  [phone]  Fax:  [phone]    Date of Visit: 02/07/2025    Patient: Deisy Gilbert  YOB: 1990  MRN: [id number]     Referring Provider: Nanci Lucero M.D.  [address] [facility]  [address]  [place] [address]   Referring Diagnosis Low back pain, unspecified [M54.50];Other chronic pain [G89.29]     Visit Diagnoses     ICD-10-CM   1. Chronic bilateral low back pain without sciatica  M54.50    G89.29       Rehab Potential: good    Progress Report Period: 12/17/2024-2/7/2025    Functional Assessment Used  Oswestry Low Back Pain Disability Total Score: 10       Objective Findings and Assessment:   Patient progression towards goals: Deisy reports that pain has been controlled well recently. She does still have some stiffness but the stretches help and she reports that she hasn't been able to do the HEP quite as much as prescribed but she is doing her best and it helps a lot when she does.    She does note however, that she hasn't returned to the gym and doing some of the more difficult exercises that she wants to return to.     Objective findings and assessment details: ASSESSMENT  Deisy has continued to demonstrate excellent overall improvement in physical therapy. She is experiencing less pain even with continued progression of functional strengthening. She has demonstrated good overall improvement to strength and has begun performing medium weight squats and dead lifts. However, her strength remains limited, she has not achieved all of her therapy goals, and Deisy still requires oversight for progression of lifts because she has intermittent breakdown in form. She will benefit from another month of therapy to achieve her PT goals and develop the independence in these exercises so that she can perform them on her own without potential injury due  to poor mechanics.    OBJECTIVE     Strength:       Abdominals   Left: 4  Right: 4     Left Hip   Planes of Motion   Flexion: 4+  Extension: 4-  Abduction: 4  Adduction: 4+  External rotation: 4-  Internal rotation: 4+     Right Hip   Planes of Motion   Flexion: 4  Extension: 4-  Abduction: 4-  Adduction: 4-  External rotation: 4-  Internal rotation: 4+     Left Knee   Flexion: 5  Extension: 5     Right Knee   Flexion: 5  Extension: 5      Goals:   Short Term Goals:   1. Patient will demonstrate independent regular performance of tailored home exercise program in order to facilitate recovery and promote self treatment and patient ownership of recovery  -- MET --  2. Patient will demo normal flexibility to bilateral hip flexor musculature to reduce pull into anterior pelvic tilt and reduce lumbar hyperlordosis during squat lift and other full body transitional movements  -- MET --  3. Patient will demo core strength >/= 4/5 (3+/5 at eval) in order to indicate strength needed to control and mitigate symptoms with performance of domestic duties and community participation.  -- MET --  Short term goal time span:  2-4 weeks      Long Term Goals:    1. Patient will demo bilateral hip abduction strength >/= 4-/5 (3/5 at eval) to improve sagittal plane stability during squat as well as single leg phase of gait to reduce QL compensation  -- IN PROGRESS --  2. Patient will demo bilateral hip extension strength >/= 4/5 (3+/5 at eval) to improve transfer and lift mechanics and reduce lumbar strain  -- IN PROGRESS, 4-/5 --  3. Patient will self report </= 12% disability via Oswestry functional assessment questionnaire (26% at eval) -- MET, 10% --    Long term goal time span:  6-8 weeks    Plan:   Planned therapy interventions:  Therapeutic Exercise (CPT 34080) and Therapeutic Activities (CPT 21048)  Frequency:  2x week  Duration in weeks:  4      Referring provider co-signature:  I have reviewed this plan of care and my  co-signature certifies the need for services.     Certification Period: 02/07/2025 to 03/07/25    Physician Signature: ________________________________ Date: ______________

## 2025-02-08 NOTE — OP THERAPY DAILY TREATMENT
Outpatient Physical Therapy  DAILY TREATMENT     [facility] Physical [address].  [address]  [place] [address]  Phone:  [phone]  Fax:  [phone]    Date: 02/07/2025    Patient: Deisy Gilbert  YOB: 1990  MRN: [id number]     Time Calculation    Start time: 1615  Stop time: 1709 Time Calculation (min): 54 minutes     Chief Complaint: Back Problem    Visit #: 9    SUBJECTIVE:  Please see SC    OBJECTIVE:  Current objective measures: Please see SC  Oswestry Low Back Pain Disability Total Score: 10       Therapeutic Exercises (CPT 13686):     1. NuStep w/u, level 5, 6'    3. QP hip airplane plus IR/ER, NT    6. superman, reproduced concordant symptoms to 7/10, relived on cessation    7. prone plank, 2x to fatigue, mild L LB discomfort after    8. bird dog, x20 ea, NT    10. dead lift, 5# 1x to fatigue, 45# 1x to fatigue    11. QP fire hydrant, 0#, NT    13. PB bridge, NT    14. lumbar stretch, 2' ea    15. low row + trunk rotation, NT    16. D2 flexion, green TB x1, pink TB x1 to fatigue    17. Dead lift, form coaching and DL with 65#    18. stagger stance sit down    19. figure 4 bridge    20. MB squat lift      Therapeutic Exercise Summary: HEP: DKTC, LTR, SL open book, TrA activation, supine march, bridge    UPDATED HEP (12/17/2024): SL open book, SL hip abd --> band, stagger stance sit to stand, figure 4 bridge, hip flexor stretch, MB squat lift    Added to HEP (2/7/2024): lumbar stretch    Therapeutic Treatments and Modalities:     1. Manual Therapy (CPT 31464), Tx paraspinal MFR, lat MFR, T4-T8 gr 2 PA JM, NT    Time-based treatments/modalities:    Physical Therapy Timed Treatment Charges  Therapeutic exercise minutes (CPT 75036): 54 minutes    ASSESSMENT:   Response to treatment: Please see SC    PLAN/RECOMMENDATIONS:   Plan for treatment: therapy treatment to continue next visit.  Planned interventions for next visit: continue with current treatment.

## 2025-02-12 ENCOUNTER — PHYSICAL THERAPY (OUTPATIENT)
Dept: PHYSICAL THERAPY | Facility: REHABILITATION | Age: 35
End: 2025-02-12
Attending: STUDENT IN AN ORGANIZED HEALTH CARE EDUCATION/TRAINING PROGRAM
Payer: COMMERCIAL

## 2025-02-12 DIAGNOSIS — G89.29 CHRONIC BILATERAL LOW BACK PAIN WITHOUT SCIATICA: ICD-10-CM

## 2025-02-12 DIAGNOSIS — M54.50 CHRONIC BILATERAL LOW BACK PAIN WITHOUT SCIATICA: ICD-10-CM

## 2025-02-12 PROCEDURE — 97110 THERAPEUTIC EXERCISES: CPT | Performed by: PHYSICAL THERAPIST

## 2025-02-13 ENCOUNTER — APPOINTMENT (OUTPATIENT)
Dept: PHYSICAL THERAPY | Facility: REHABILITATION | Age: 35
End: 2025-02-13
Attending: STUDENT IN AN ORGANIZED HEALTH CARE EDUCATION/TRAINING PROGRAM
Payer: COMMERCIAL

## 2025-02-17 ENCOUNTER — OFFICE VISIT (OUTPATIENT)
Dept: URGENT CARE | Facility: CLINIC | Age: 35
End: 2025-02-17
Payer: COMMERCIAL

## 2025-02-17 ENCOUNTER — APPOINTMENT (OUTPATIENT)
Dept: RADIOLOGY | Facility: IMAGING CENTER | Age: 35
End: 2025-02-17
Attending: FAMILY MEDICINE
Payer: COMMERCIAL

## 2025-02-17 VITALS
SYSTOLIC BLOOD PRESSURE: 104 MMHG | WEIGHT: 165 LBS | OXYGEN SATURATION: 96 % | TEMPERATURE: 96.1 F | RESPIRATION RATE: 14 BRPM | HEART RATE: 74 BPM | BODY MASS INDEX: 31.15 KG/M2 | HEIGHT: 61 IN | DIASTOLIC BLOOD PRESSURE: 72 MMHG

## 2025-02-17 DIAGNOSIS — S67.196A CRUSHING INJURY OF RIGHT LITTLE FINGER, INITIAL ENCOUNTER: ICD-10-CM

## 2025-02-17 PROCEDURE — 3078F DIAST BP <80 MM HG: CPT | Performed by: FAMILY MEDICINE

## 2025-02-17 PROCEDURE — 3074F SYST BP LT 130 MM HG: CPT | Performed by: FAMILY MEDICINE

## 2025-02-17 PROCEDURE — 73140 X-RAY EXAM OF FINGER(S): CPT | Mod: TC,RT | Performed by: RADIOLOGY

## 2025-02-17 PROCEDURE — 99213 OFFICE O/P EST LOW 20 MIN: CPT | Performed by: FAMILY MEDICINE

## 2025-02-17 ASSESSMENT — FIBROSIS 4 INDEX: FIB4 SCORE: 0.5

## 2025-02-17 ASSESSMENT — ENCOUNTER SYMPTOMS: SENSORY CHANGE: 0

## 2025-02-17 NOTE — PROGRESS NOTES
Subjective:     Deisy Gilbert is a 34 y.o. female who presents for Finger Injury (Possible fracture right hand pinky finger , dropped a dumbbell 35 lbs  )    HPI  Pt presents for evaluation of an acute problem  Patient with an injury to the right fifth finger earlier this morning  Dropped a 35 pound weight onto the distal fingertip injuring the finger at the area of the fingernail  Has some bleeding from the area, but fingernail did not break and no large lacerations  Range of motion is limited by pain  Concerned that she may have broken her finger    Review of Systems   Skin:  Negative for rash.   Neurological:  Negative for sensory change.       PMH:  has a past medical history of Abdominal pain, Back pain, Back pain, Blood in urine, Daytime sleepiness, Depression, Diarrhea, Fatigue, Frequent headaches, Gasping for breath, Graves disease (08/18/2023), Heartburn, History of kidney stones, Hyperlipidemia, Hypertension, Hyperthyroidism, Insomnia, Irregular periods, Morning headache, Nasal drainage, Nausea, Palpitations, Sleep apnea, Sweat, sweating, excessive, Weakness, Wears glasses, and Weight loss.  MEDS:   Current Outpatient Medications:     methimazole (TAPAZOLE) 5 MG Tab, Take 1 Tablet by mouth 3 times a day., Disp: 90 Tablet, Rfl: 5    LOW-OGESTREL 0.3-30 MG-MCG Tab, TAKE 1 TABLET BY MOUTH EVERY DAY, Disp: 84 Tablet, Rfl: 4    cyclobenzaprine (FLEXERIL) 5 mg tablet, TAKE 1 TABLET BY MOUTH 2 TIMES A DAY AS NEEDED FOR MUSCLE SPASMS OR MODERATE PAIN., Disp: 30 Tablet, Rfl: 0    Cyanocobalamin (VITAMIN B 12 PO), , Disp: , Rfl:     propranolol (INDERAL) 10 MG Tab, Take 1 Tablet by mouth 3 times a day., Disp: , Rfl:     SUMAtriptan (IMITREX) 50 MG Tab, Take 1 Tablet by mouth one time as needed for Migraine for up to 1 dose., Disp: 10 Tablet, Rfl: 3    Magnesium 200 MG Tab, Take 200 mg by mouth every day., Disp: , Rfl:     diclofenac sodium (VOLTAREN) 1 % Gel, Apply 2 g topically 4 times a day as needed (back  "pain)., Disp: 50 g, Rfl: 0    ciclopirox (PENLAC) 8 % solution, Apply evenly over entire nail plate nightly to all affected nails., Disp: 6 mL, Rfl: 3    Vitamin D, Cholecalciferol, (CHOLECALCIFEROL) 25 MCG (1000 UT) Tab, Take 1,000 Units by mouth every day., Disp: , Rfl:     Potassium Citrate 15 MEQ (1620 MG) Tab CR, , Disp: , Rfl:   ALLERGIES:   Allergies   Allergen Reactions    Lactose      Other reaction(s): GI Intolerance     SURGHX:   Past Surgical History:   Procedure Laterality Date    STENT PLACEMENT  10/2023    CYSTOSCOPY STENT PLACEMENT  2017     SOCHX:  reports that she quit smoking about 12 years ago. Her smoking use included cigarettes. She started smoking about 16 years ago. She has never used smokeless tobacco. She reports that she does not currently use alcohol. She reports that she does not use drugs.     Objective:   /72 (BP Location: Left arm, Patient Position: Sitting, BP Cuff Size: Adult)   Pulse 74   Temp (!) 35.6 °C (96.1 °F) (Temporal)   Resp 14   Ht 1.549 m (5' 1\")   Wt 74.8 kg (165 lb)   SpO2 96%   BMI 31.18 kg/m²     Physical Exam  Constitutional:       General: She is not in acute distress.     Appearance: She is well-developed. She is not diaphoretic.   Pulmonary:      Effort: Pulmonary effort is normal.   Neurological:      Mental Status: She is alert.     Right fifth finger with mild swelling, subungual hematoma, mild bleeding from the medial and lateral edges of fingernail, fingernail feels very slightly loose but firmly intact, no full-thickness lacerations, moderate tenderness throughout the distal finger, range of motion limited by pain, neurovascularly intact at the distal fingertip    Assessment/Plan:   Assessment    1. Crushing injury of right little finger, initial encounter  - DX-FINGER(S) 2+ RIGHT; Future    Patient with injury to the right little finger.  Has a questionable distal phalanx fracture on x-ray.  Recommended staying in finger splint for the next " week until follow-up at sports medicine office or PCP office.  Reviewed wound care management.  The fingernail is only slightly loose and did not recommend removal in office today.  Did review what to do if the fingernail does become more loose or if it falls off on its own over the next week.  All questions answered and will follow-up in sports medicine office or with PCP in about a week.

## 2025-02-17 NOTE — LETTER
February 17, 2025    To Whom It May Concern:         This is confirmation that Deisy Gilbert attended her scheduled appointment with Charlie Grullon M.D. on 2/17/25.  She is recovering from acute injury to her right hand.  She will have some difficulties using her right hand temporarily for the next week.  She may need extra time to complete tasks involving a computer or any writing/drawing.  Thank you for making accommodations as she recovers.         If you have any questions please do not hesitate to call me at the phone number listed below.    Sincerely,          Charlie Grullon M.D.  [phone]

## 2025-02-18 ENCOUNTER — OFFICE VISIT (OUTPATIENT)
Dept: MEDICAL GROUP | Facility: MEDICAL CENTER | Age: 35
End: 2025-02-18
Payer: COMMERCIAL

## 2025-02-18 VITALS
OXYGEN SATURATION: 96 % | SYSTOLIC BLOOD PRESSURE: 108 MMHG | HEART RATE: 78 BPM | WEIGHT: 165 LBS | DIASTOLIC BLOOD PRESSURE: 68 MMHG | TEMPERATURE: 97.3 F | BODY MASS INDEX: 31.15 KG/M2 | HEIGHT: 61 IN

## 2025-02-18 DIAGNOSIS — S67.10XA CRUSHING INJURY OF FINGER OF RIGHT HAND: ICD-10-CM

## 2025-02-18 PROCEDURE — 99214 OFFICE O/P EST MOD 30 MIN: CPT | Performed by: BEHAVIOR ANALYST

## 2025-02-18 PROCEDURE — 3074F SYST BP LT 130 MM HG: CPT | Performed by: BEHAVIOR ANALYST

## 2025-02-18 PROCEDURE — 3078F DIAST BP <80 MM HG: CPT | Performed by: BEHAVIOR ANALYST

## 2025-02-18 RX ORDER — IBUPROFEN 600 MG/1
600 TABLET, FILM COATED ORAL EVERY 6 HOURS PRN
Qty: 30 TABLET | Refills: 0 | Status: SHIPPED | OUTPATIENT
Start: 2025-02-18

## 2025-02-18 ASSESSMENT — FIBROSIS 4 INDEX: FIB4 SCORE: 0.5

## 2025-02-18 NOTE — Clinical Note
REFERRAL APPROVAL NOTICE         Sent on February 18, 2025                   Deisy Gilbert  [address]  [place] [address]                   Dear Ms. Gilbert,    After a careful review of the medical information and benefit coverage, [facility] has processed your referral. See below for additional details.    If applicable, you must be actively enrolled with your insurance for coverage of the authorized service. If you have any questions regarding your coverage, please contact your insurance directly.    REFERRAL INFORMATION   Referral #:  [id number]  Referred-To Department    Referred-By Provider:  Sports Medicine    Charlie Grullon M.D.   [facility] [address]      [address]  [place] [address]  [phone] [address]  [place] [address]  [phone]    Referral Start Date:  02/17/2025  Referral End Date:   02/17/2026             SCHEDULING  If you do not already have an appointment, please call [phone] to make an appointment.     MORE INFORMATION  If you do not already have a [employer] account, sign up at: "[email or web address]  You can access your medical information, make appointments, see lab results, billing information, and more.  If you have questions regarding this referral, please contact  the [facility] Referrals department at:             [phone]. Monday - Friday 8:00AM - 5:00PM.     Sincerely,    [facility]

## 2025-02-19 ENCOUNTER — PHYSICAL THERAPY (OUTPATIENT)
Dept: PHYSICAL THERAPY | Facility: REHABILITATION | Age: 35
End: 2025-02-19
Attending: STUDENT IN AN ORGANIZED HEALTH CARE EDUCATION/TRAINING PROGRAM
Payer: COMMERCIAL

## 2025-02-19 DIAGNOSIS — G89.29 CHRONIC BILATERAL LOW BACK PAIN WITHOUT SCIATICA: ICD-10-CM

## 2025-02-19 DIAGNOSIS — M54.50 CHRONIC BILATERAL LOW BACK PAIN WITHOUT SCIATICA: ICD-10-CM

## 2025-02-19 PROCEDURE — 97110 THERAPEUTIC EXERCISES: CPT | Performed by: PHYSICAL THERAPIST

## 2025-02-19 NOTE — OP THERAPY DAILY TREATMENT
"  Outpatient Physical Therapy  DAILY TREATMENT     [facility] Physical [address].  [address]  [place] [address]  Phone:  [phone]  Fax:  [phone]    Date: 02/19/2025    Patient: Deisy Gilbert  YOB: 1990  MRN: [id number]     Time Calculation    Start time: 1035  Stop time: 1128 Time Calculation (min): 53 minutes     Chief Complaint: Back Problem    Visit #: 11    SUBJECTIVE:  Deisy reports that she accidentally broke her R pinky finger yesterday because she dropped a heavy weight on it. She went to urgent care and was diagnosed with a fracture. Today her finger is in a splint but she can't really use it due to pain and tissue healing.     OBJECTIVE:  Current objective measures: NT          Therapeutic Exercises (CPT 66679):     1. NuStep w/u, 6' level 5    2. BB rocking FWD/BWD, emphasis on core and postural stability    3. BB rocking SS, emphasis on core and postural stability    4. lateral step down 8\" step, 2x to fatigue ea, emphasis on core and postural stability    5. Shuttle squat DL, 7 cords 2x to fatigue    6. Shuttle squat SL, 5 cords 2x to fatigue ea    7. Isometric lat pulldown c TrA activation, to fatigue +1\" holds      Therapeutic Exercise Summary: HEP: DKTC, LTR, SL open book, TrA activation, supine march, bridge    UPDATED HEP (12/17/2024): SL open book, SL hip abd --> band, stagger stance sit to stand, figure 4 bridge, hip flexor stretch, MB squat lift    Added to HEP (2/7/2024): lumbar stretch      Time-based treatments/modalities:    Physical Therapy Timed Treatment Charges  Therapeutic exercise minutes (CPT 10666): 53 minutes    ASSESSMENT:   Response to treatment: Modified today's POC based on Deisy's report of distal 5th phalanx fracture. No use of RUE during today's session. We focused on LE strengthening and balance instead and spent time discussing changes to her gym routine during fracture healing. We will continue to monitor Deisy's " benefit from therapy during this time. We will potentially hold therapy as needed.     PLAN/RECOMMENDATIONS:   Plan for treatment: therapy treatment to continue next visit.  Planned interventions for next visit: continue with current treatment.

## 2025-02-23 NOTE — PROGRESS NOTES
Subjective:   Verbal consent was acquired by the patient to use [employer] ambient listening note generation during this visit Yes    CC:    Chief Complaint   Patient presents with    Finger Injury     Yesterday,           HISTORY OF THE PRESENT ILLNESS:   Deisy presents today   History of Present Illness  43-year-old female presents for follow-up after a finger injury.    On 2/17 she experienced arm weakness during weightlifting, causing a 35lb dumbbell to drop on her right pinky finger and nail, resulting in significant bleeding. Urgent care completed x-rays with no discernible fracture but suspected there still might be a underlying fracture and advised a splint for 3 weeks.     She reports intermittent throbbing pain, burning sensation, and discomfort with nail pressure.  She performed a dressing change today and concerned that the nail appears embedded in the skin, and she observed clear fluid drainage so decided to come in today. She has been using Tylenol for pain and was advised to take ibuprofen 600 mg but did not receive a prescription. She seeks clarification on follow-up with a sports medicine specialist.        MEDICATIONS  Current: Tylenol      Current Outpatient Medications   Medication Sig    ibuprofen (MOTRIN) 600 MG Tab Take 1 Tablet by mouth every 6 hours as needed for Moderate Pain or Inflammation. With full glass of water and food.    methimazole (TAPAZOLE) 5 MG Tab Take 1 Tablet by mouth 3 times a day.    LOW-OGESTREL 0.3-30 MG-MCG Tab TAKE 1 TABLET BY MOUTH EVERY DAY    cyclobenzaprine (FLEXERIL) 5 mg tablet TAKE 1 TABLET BY MOUTH 2 TIMES A DAY AS NEEDED FOR MUSCLE SPASMS OR MODERATE PAIN.    Cyanocobalamin (VITAMIN B 12 PO)     propranolol (INDERAL) 10 MG Tab Take 1 Tablet by mouth 3 times a day.    SUMAtriptan (IMITREX) 50 MG Tab Take 1 Tablet by mouth one time as needed for Migraine for up to 1 dose.    Magnesium 200 MG Tab Take 200 mg by mouth every day.    diclofenac sodium (VOLTAREN)  "1 % Gel Apply 2 g topically 4 times a day as needed (back pain).    ciclopirox (PENLAC) 8 % solution Apply evenly over entire nail plate nightly to all affected nails.    Vitamin D, Cholecalciferol, (CHOLECALCIFEROL) 25 MCG (1000 UT) Tab Take 1,000 Units by mouth every day.    Potassium Citrate 15 MEQ (1620 MG) Tab CR           ROS: See HPI  ROS      Objective:     Exam: /68 (BP Location: Right arm, Patient Position: Sitting, BP Cuff Size: Adult)   Pulse 78   Temp 36.3 °C (97.3 °F) (Temporal)   Ht 1.549 m (5' 1\")   Wt 74.8 kg (165 lb)   SpO2 96%   BMI 31.18 kg/m²   Body mass index is 31.18 kg/m².    Physical Exam  Constitutional:       Appearance: Normal appearance.   HENT:      Head: Normocephalic and atraumatic.   Cardiovascular:      Pulses: Normal pulses.   Pulmonary:      Effort: Pulmonary effort is normal.   Musculoskeletal:      Cervical back: Normal range of motion.   Skin:     Comments: Right fifth finger with minimal swelling, subungual hematoma, no significant drainage,  fingernail tender to palpation but firmly intact, no full-thickness lacerations, moderate tenderness throughout the distal finger, range of motion limited by pain, neurovascularly intact at the distal fingertip   Neurological:      General: No focal deficit present.      Mental Status: She is alert.           Assessment & Plan:     34 y.o. female with the following -     1. Crushing injury of finger of right hand  ibuprofen (MOTRIN) 600 MG Tab          Assessment & Plan    - Nail is intact but expected to detach during healing process as new, undamaged nail grows in.  No sign of obvious paronychia  - Macerated tissue noted, likely from covering tightly and bleeding  - No evidence of infection  - Prescribed ibuprofen 600 mg TID for 4-5 days along with 1000 mg acetaminophen up to 4 times daily, with food and water  - Cleaned wound with saline, apply Neosporin, and use nonstick gauze  - Change bandage daily, leave wound exposed " to air for 20-60 minutes before dressing change  - Use splint for 3 weeks  - Return if signs of infection (increased swelling, warmth, redness, purulent drainage) occur    Follow-up as needed    I spent a total of 32 minutes with record review, exam, communication with the patient, communication with other providers, and documentation of this encounter.          Please note that this dictation was created using voice recognition software. I have made every reasonable attempt to correct obvious errors, but I expect that there are errors of grammar and possibly content that I did not discover before finalizing the note.

## 2025-03-03 ENCOUNTER — HOSPITAL ENCOUNTER (OUTPATIENT)
Dept: LAB | Facility: MEDICAL CENTER | Age: 35
End: 2025-03-03
Attending: STUDENT IN AN ORGANIZED HEALTH CARE EDUCATION/TRAINING PROGRAM
Payer: COMMERCIAL

## 2025-03-03 DIAGNOSIS — E05.00 GRAVES DISEASE: ICD-10-CM

## 2025-03-03 LAB
T3 SERPL-MCNC: 136 NG/DL (ref 60–181)
T4 FREE SERPL-MCNC: 1.16 NG/DL (ref 0.93–1.7)
TSH SERPL-ACNC: 2.07 UIU/ML (ref 0.35–5.5)

## 2025-03-03 PROCEDURE — 84443 ASSAY THYROID STIM HORMONE: CPT

## 2025-03-03 PROCEDURE — 84439 ASSAY OF FREE THYROXINE: CPT

## 2025-03-03 PROCEDURE — 84480 ASSAY TRIIODOTHYRONINE (T3): CPT

## 2025-03-03 PROCEDURE — 36415 COLL VENOUS BLD VENIPUNCTURE: CPT

## 2025-03-04 ENCOUNTER — APPOINTMENT (OUTPATIENT)
Dept: SPORTS MEDICINE | Facility: OTHER | Age: 35
End: 2025-03-04
Attending: FAMILY MEDICINE
Payer: COMMERCIAL

## 2025-03-04 ENCOUNTER — APPOINTMENT (OUTPATIENT)
Dept: PHYSICAL THERAPY | Facility: REHABILITATION | Age: 35
End: 2025-03-04
Attending: STUDENT IN AN ORGANIZED HEALTH CARE EDUCATION/TRAINING PROGRAM
Payer: COMMERCIAL

## 2025-03-04 VITALS
DIASTOLIC BLOOD PRESSURE: 76 MMHG | HEART RATE: 74 BPM | BODY MASS INDEX: 31.15 KG/M2 | HEIGHT: 61 IN | SYSTOLIC BLOOD PRESSURE: 116 MMHG | RESPIRATION RATE: 16 BRPM | WEIGHT: 165 LBS | TEMPERATURE: 98.2 F | OXYGEN SATURATION: 97 %

## 2025-03-04 DIAGNOSIS — S60.151D: ICD-10-CM

## 2025-03-04 PROCEDURE — 3078F DIAST BP <80 MM HG: CPT | Performed by: FAMILY MEDICINE

## 2025-03-04 PROCEDURE — 99213 OFFICE O/P EST LOW 20 MIN: CPT | Performed by: FAMILY MEDICINE

## 2025-03-04 PROCEDURE — 3074F SYST BP LT 130 MM HG: CPT | Performed by: FAMILY MEDICINE

## 2025-03-04 ASSESSMENT — ENCOUNTER SYMPTOMS
DIZZINESS: 0
VOMITING: 0
NAUSEA: 1
FEVER: 0
CHILLS: 0
SHORTNESS OF BREATH: 0

## 2025-03-04 ASSESSMENT — FIBROSIS 4 INDEX: FIB4 SCORE: 0.5

## 2025-03-04 NOTE — PROGRESS NOTES
Chief Complaint   Patient presents with    Finger Pain     R little finger injury      Subjective     Patient is here for RIGHT little finger injury  Date of injury, February 17, 2025  Mechanism, lifting weights with dumbbells and having difficulty with her last rep  As she came up to redirect the weight she smashed her RIGHT little finger  Started noticing the nail coming up and bleeding of the distal RIGHT little finger  Not really having too much pain with immobilization  Mostly pressure sensation under the nail  She does have pain with applying any pressure to the area  She was seen at urgent care and fitted with finger splint  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   Respiratory:  Negative for shortness of breath.    Cardiovascular:  Negative for chest pain.   Gastrointestinal:  Positive for nausea. Negative for vomiting.   Neurological:  Negative for dizziness.   She did have some nausea immediately after the injury which resolved after a few minutes    PMH:  has a past medical history of Abdominal pain, Back pain, Back pain, Blood in urine, Daytime sleepiness, Depression, Diarrhea, Fatigue, Frequent headaches, Gasping for breath, Graves disease (08/18/2023), Heartburn, History of kidney stones, Hyperlipidemia, Hypertension, Hyperthyroidism, Insomnia, Irregular periods, Morning headache, Nasal drainage, Nausea, Palpitations, Sleep apnea, Sweat, sweating, excessive, Weakness, Wears glasses, and Weight loss.  MEDS:   Current Outpatient Medications:     ibuprofen (MOTRIN) 600 MG Tab, Take 1 Tablet by mouth every 6 hours as needed for Moderate Pain or Inflammation. With full glass of water and food., Disp: 30 Tablet, Rfl: 0    methimazole (TAPAZOLE) 5 MG Tab, Take 1 Tablet by mouth 3 times a day., Disp: 90 Tablet, Rfl: 5     "LOW-OGESTREL 0.3-30 MG-MCG Tab, TAKE 1 TABLET BY MOUTH EVERY DAY, Disp: 84 Tablet, Rfl: 4    cyclobenzaprine (FLEXERIL) 5 mg tablet, TAKE 1 TABLET BY MOUTH 2 TIMES A DAY AS NEEDED FOR MUSCLE SPASMS OR MODERATE PAIN., Disp: 30 Tablet, Rfl: 0    Cyanocobalamin (VITAMIN B 12 PO), , Disp: , Rfl:     propranolol (INDERAL) 10 MG Tab, Take 1 Tablet by mouth 3 times a day., Disp: , Rfl:     SUMAtriptan (IMITREX) 50 MG Tab, Take 1 Tablet by mouth one time as needed for Migraine for up to 1 dose., Disp: 10 Tablet, Rfl: 3    Magnesium 200 MG Tab, Take 200 mg by mouth every day., Disp: , Rfl:     diclofenac sodium (VOLTAREN) 1 % Gel, Apply 2 g topically 4 times a day as needed (back pain)., Disp: 50 g, Rfl: 0    ciclopirox (PENLAC) 8 % solution, Apply evenly over entire nail plate nightly to all affected nails., Disp: 6 mL, Rfl: 3    Vitamin D, Cholecalciferol, (CHOLECALCIFEROL) 25 MCG (1000 UT) Tab, Take 1,000 Units by mouth every day., Disp: , Rfl:     Potassium Citrate 15 MEQ (1620 MG) Tab CR, , Disp: , Rfl:   ALLERGIES:   Allergies   Allergen Reactions    Lactose      Other reaction(s): GI Intolerance     SURGHX:   Past Surgical History:   Procedure Laterality Date    STENT PLACEMENT  10/2023    CYSTOSCOPY STENT PLACEMENT  2017     SOCHX:  reports that she quit smoking about 12 years ago. Her smoking use included cigarettes. She started smoking about 16 years ago. She has never used smokeless tobacco. She reports that she does not currently use alcohol. She reports that she does not use drugs.  FH: Family history was reviewed, no pertinent findings to report    Objective   /76 (BP Location: Left arm, Patient Position: Sitting, BP Cuff Size: Adult)   Pulse 74   Temp 36.8 °C (98.2 °F) (Temporal)   Resp 16   Ht 1.549 m (5' 1\")   Wt 74.8 kg (165 lb)   SpO2 97%   BMI 31.18 kg/m²     Hand exam    NAD  Alert and oriented    BILATERAL hand exam  Mild swelling of the RIGHT distal little finger with ecchymosis at the " nailbed and along the nail  NO deformity  Range of motion of all MCP, DIP and PIP joints NORMAL  Pinky opposition NORMAL  Grind test is NEGATIVE  Collateral ligament testing is NORMAL    1. Contusion of right little finger with damage to nail, subsequent encounter          Date of injury, February 17, 2025  Mechanism, lifting weights with dumbbells and having difficulty with her last rep    She is actually doing fairly well  Within the majority of her symptoms are coming from her nail hematoma and not necessarily from fracture    We refitted her splint to avoid immobilization of the PIP joint    Follow-up as needed        2/17/2025 9:04 AM     HISTORY/REASON FOR EXAM:  Pain/Deformity Following Trauma; Pain in the distal finger in region of distal phalanx        TECHNIQUE/EXAM DESCRIPTION AND NUMBER OF VIEWS:  3 views of the RIGHT fingers.     COMPARISON: None     FINDINGS:     Mild cortical irregularity at the tip of the fifth distal phalanx     No joint osteoarthritis.     IMPRESSION:           Mild cortical irregularity at the tip of the fifth distal phalanx may relate to nondisplaced fracture.        Exam Ended: 02/17/25  9:19 AM Last Resulted: 02/17/25  9:27 AM     Interpreted in the office today with the patient

## 2025-03-11 ENCOUNTER — APPOINTMENT (OUTPATIENT)
Dept: PHYSICAL THERAPY | Facility: REHABILITATION | Age: 35
End: 2025-03-11
Attending: STUDENT IN AN ORGANIZED HEALTH CARE EDUCATION/TRAINING PROGRAM
Payer: COMMERCIAL

## 2025-03-16 NOTE — PROGRESS NOTES
"Follow up Endocrinology Visit  Initial consult/last visit on: 9/7/23  Last visit on: 12/18/24  Referred by:  Nanci Lucero M.D.    Chief complaint:  Deisy Gilbert, is a very pleasant 34 y.o.female, who is here for follow up for Graves disease    Interval history:   - overall doing well  - lost a lot of weight with physical activities  - had couple episodes of palpitations, was evaluated by cardiology, no pathology was found  - reviewed recent labs  Prior notes:  - since last visit feeling much better: tremor stopped, muscle weakness resolved, able to squat without any problem, currently using cane intermittently with back pains, which she associates with kidney stones  - diarrhea improved, but not resolved, patient asked for referral to GI specialist from PCP  - reports new right eye periorbital edema, gritty sensation, light sensitivity, ptosis, intermittent blurry vision, scheduled with neuro-ophthalmologist on 10/17/2023  - plans for surgical kidney stones removal on 10/18/2023  - reviewed with the patient new labs and thyroid US  11/09/23  - overall doing much better, however still feels very tired,  despite sleeping well  - gained couple lb since last visit  - still has diarrhea, even it much improved since initiation Rx with MMI; plans for GI evaluation  - had surgery for kidney stone removal, unfortunately, kidney stone was no evaluated for composition  - still has R eye ptosis and tearing, myasthenia gravis was ruled out  - followed by neuro-ophthalmologist, for now recommended active surveillance  - recent labs were reviewed  2/09/24  - patient developed disturbing knee pains, noted elevated MEGHAN, was seen by rheumatologist, there is a concern of possible MMI-induced SLE  - patient continues to feel fatigue  - eye \"are getting better\", she still feels gritty  sensation, uses artifical tears, denies blurry/double vision   4/12/24  - patient felt better after increasing dose of MMI, but later on feeling more " "fatigued, reports \"brain fog\"  - continues to have some L knee pain, follows rheumatologist who does not believe patient has MMI induced SLE  - noted blurry vision  6/24/24  - overall doing well  - eye symptoms improved but still sometimes has intermittent swelling; joint pain improved  - she also reports fatigue, not able to do hikes she was doing prior Dx of Graves disease  - she recently had 2 ED visits - 1st with palpitations, SBP up to 220 mmHg, second with migraine and elevated BP, she admits having panic attacks 15 years ago but she does not believe it was it  - she was measuring her BP at home, and it was < 140/85 mmHg  - her PCP is on vacation, she was able to schedule follow up appointment only in 9/2024  - reviewed recent labs  8/21/24  - did a sleep study 2 mo ago => started on CPAP 1 mo ago -> causing worsening of eye dryness  - with diet and exercise was able to lose weight  - reports morning periorbital edema  - reviewed recent labs  12/18/24  - overall doing well  - started heavy lifting to lose weight, noted menstrual cycle irregularities  - still has some itchiness and watering of her L eye, otherwise eyes are not bothering her  - reviewed recent labs    Hyperthyroidism HPI:  - was feeling tired for many months  - she lost 11 lb but associated with recent loss of her friend  - since beginning of 8/2023 patient noted nausea, diarrhea (having up to 4 BMS/day, watery stools), later on she noted palpitations  - she also noted worsening of her anxiety, reports  insomnia, sweating/hot flashes, heaviness on her chest, hand tremors, muscle weakness to the point that she needs to use cane to walk - persistent  - on labs from 8/23/23 - biochemical evidence of hyperthyroidism + elevated TrAbs  - was started on MMI 30 mg/day + propranolol 10 mg TID - no improvement of symptoms so far  - nonsmoker  - Fhx - thyroid disease in her aunt, not sure about the diagnosis  ALEJANDRO symptoms:  Dry, gritty eye  sensation - in " "R eye  Light sensitivity - in R eye  Tearing - no  Red eye - no  Proptosis -  R eye  Pain or pressure behind the eye - pressure behind R eye  Retroorbital pain - no  Pain with eye movement  - no   Eyelid retraction -  no  Eyelid swelling - R eye  Discomfort or pain in or behind the eye with looking L/R or up/down - no  Double vision - no  Blurry vision - intermittently  Color vision loss - no  Vision loss - no    Medications:  Current Outpatient Medications:     ibuprofen (MOTRIN) 600 MG Tab, Take 1 Tablet by mouth every 6 hours as needed for Moderate Pain or Inflammation. With full glass of water and food., Disp: 30 Tablet, Rfl: 0    methimazole (TAPAZOLE) 5 MG Tab, Take 1 Tablet by mouth 3 times a day., Disp: 90 Tablet, Rfl: 5    LOW-OGESTREL 0.3-30 MG-MCG Tab, TAKE 1 TABLET BY MOUTH EVERY DAY, Disp: 84 Tablet, Rfl: 4    cyclobenzaprine (FLEXERIL) 5 mg tablet, TAKE 1 TABLET BY MOUTH 2 TIMES A DAY AS NEEDED FOR MUSCLE SPASMS OR MODERATE PAIN., Disp: 30 Tablet, Rfl: 0    Cyanocobalamin (VITAMIN B 12 PO), , Disp: , Rfl:     propranolol (INDERAL) 10 MG Tab, Take 1 Tablet by mouth 3 times a day., Disp: , Rfl:     SUMAtriptan (IMITREX) 50 MG Tab, Take 1 Tablet by mouth one time as needed for Migraine for up to 1 dose., Disp: 10 Tablet, Rfl: 3    Magnesium 200 MG Tab, Take 200 mg by mouth every day., Disp: , Rfl:     diclofenac sodium (VOLTAREN) 1 % Gel, Apply 2 g topically 4 times a day as needed (back pain)., Disp: 50 g, Rfl: 0    ciclopirox (PENLAC) 8 % solution, Apply evenly over entire nail plate nightly to all affected nails., Disp: 6 mL, Rfl: 3    Vitamin D, Cholecalciferol, (CHOLECALCIFEROL) 25 MCG (1000 UT) Tab, Take 1,000 Units by mouth every day., Disp: , Rfl:     Potassium Citrate 15 MEQ (1620 MG) Tab CR, , Disp: , Rfl:     Physical Examination:   Vital signs: /74   Pulse 97   Ht 1.549 m (5' 1\") Comment: pt stated  Wt 73.7 kg (162 lb 8 oz)   SpO2 98%   BMI 30.70 kg/m²   General: No distress, " cooperative, well dressed and well nourished.   Resp: Normal effort.  CVS: Regular rate and rhythm.  Extremities: No edema bilateral extremities  Neuro: Alert and oriented.   Skin: No rash, No Ulcers  Psych: Normal mood and affect    Labs:   Reference Range  08/22/23  08/23/23  09/29/23  11/06/23  02/05/24  02/07/24  04/06/24  04/26/24 06/04/24  06/17/24  8/9/24 12/09/24 3/3/25    TSH 0.380 - 5.330  <0.005 (L) <0.005 (L) <0.005 (L) <0.005 (L) <0.005 (L)  3.180 3.250 3.760 3.090 4.180 2.790 2.070    fT4 0.93 - 1.70 ng/dL 5.20 (H) 5.87 (H) 0.98 1.56 1.22  0.90 (L) 0.94  1.11 1.11 1.07 1.16    T3 60.0 - 181.0 ng/dL   165.0    149.0     125.0 136.0    fT3 2.00 - 4.40 pg/mL  19.90 (H)               TSI <=0.54 IU/L             0.36     TrAbs <=1.75 IU/L  9.72 (H)          < 1.10     TPO- Abs 0.0 - 9.0 IU/mL      246.0 (H)           Tg- Abs  0.0 - 4.0 IU/mL      1.0           MMI mg  30 10 10 10 15 15 10 10 10 10 10 5 5     Pheochromocytoma work up:   Reference Range  08/09/24   Metanephrine Plasma 0.00 - 0.49 nmol/L <0.10   Normetanephrine Plasma 0.00 - 0.89 nmol/L 0.27     Primary hyperaldosteronism work up:   Reference Range 08/09/24   Potassium 3.6 - 5.5 mmol/L 4.2   Aldos Serum ng/dL 18.2   Renin Activity ng/mL/hr 2.1     Adrenal insufficiency work up:   Reference Range 08/09/24    Cortisol 0.0 - 23.0 ug/dL 20.3       Imaging:  - reviewed  Thyroid US on 10/2/2023:  HISTORY/REASON FOR EXAM:  Hyperthyroidism/Graves disease, thyroid gland enlargement.  TECHNIQUE/EXAM DESCRIPTION:  Ultrasound of the soft tissues of the head and neck.  COMPARISON:  None  FINDINGS:  The thyroid gland is heterogeneous.  Vascularity is increased.  The right lobe of the thyroid gland measures 2.63 cm x 4.56 cm x 2.41 cm. The contour and echogenicity are normal. No focal mass lesions are identified.  The left lobe of the thyroid gland measures 2.08 cm x 4.21 cm x 1.73 cm. The contour and echogenicity are normal. No focal mass lesions are  identified.  The isthmus measures 0.54 cm.  IMPRESSION:  1.  Mildly enlarged heterogeneous thyroid gland with hyperemia. Findings are consistent with Graves' disease.    Assessment and Plan:  #  Graves disease, on MMI since 8/2023      ALEJANDRO (R eye)      Thyroid myopathy, resolved  - euthyroid on MMI 5 mg  - likely in remission given normalized levels of TSI/TrAb  - on MMI - 1 year + 7 mo  - patient is hesitant to stop MMI therapy until she done with her abroad trip in 5/2025 as she does not want to deal with relapse during her trip  - continue MMI to 5 mg, repeat TFTs/TSI/TrAbs in 3 mo, if euthyroid and negative Graves disease markers -> stop therapy  Prior notes:  - clinical and biochemical evidence of hyperthyroidism + elevated TrAbs  - no clinical signs of ALEJANDRO but patient reports eye soreness and double vision - 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 - periodic hypokalemic paralysis and thyrotoxic periodic  paralysis are unlikely given persistent nature of muscle weakness vs myopathic attacks, already on propranolol - treatment for thyrotoxic periodic paralysis  - appropriately started on MMI and nonselective beta-blocker  - we discussed cause and natural history of the Graves disease, treatment approaches  - I explained that it takes up to 3-4 weeks to reach euthyroidism  - clinical and biochemical improvement of hyperthyroidism with normalization of free T4, TSH is still suppressed but likely is lagging in response  - we will decrease MMI to maintenance dose of 10 mg a day  - patient continues to have diarrhea, unlikely related to hyperthyroidism, celiac disease work-up was negative, agree with PCP with GI referral  - new symptoms consistent with right thyroid eye disease, referred to neuro-ophthalmologist  2/09/23  - on maintenance dose of MMI, fT4 wnl, TSH is still suppressed - likely indicates that patient needs higher dose of  "MMI  - concerns of MMI- induced SLE, follows rheumatologist  - had extensive discussion of possible approaches, there is a high risk of having SLE even with transition to PTU - will discuss with rheumatologist if that is reasonable, if neither of thionamides could be used -> consider surgical therapy, as GIRON might exacerbate ALEJANDRO  - discussed consequences of thyroidectomy and principles of replacement therapy   - patient will continue evaluation by rheumatology, will update me about plans, if transition to PTU is reasonable, if continues to take MMI -> consider increase the dose to 15 mg/day.   - ALEJANDRO improved  4/12/24  - improvement of symptoms after increasing dose of MMI  during the last visit, but then feels more fatigued, \"brain fogged\"  - on labs iatrogenic hypothyroidism  - will taper down MMI 15 -> 10 mg  - repeat fT4 in 2 weeks, TSH in 2 mo  - patient noted worsening of ALEJANDRO symptoms, eye exam unremarkable but patient reports blurry vision with down gaze, plans to see neuroophthalmologist  6/24/24  - euthyroid on MMI 10 mg/day  - recent palpitations, BP elevation could not be explained by thyroid issues  8/21/24  - already 1 year on MMI, euthyroid on MMI 10 mg/day  - continue current management, repeat TFTs and check TSI/TrAbs in 4 mo  12/18/24  - euthyroid on MMI 10 mg  - likely in remission given normalized levels of TSI/TrAb  - on MMI - 1 year + 4 mo  - patient is hesitant to stop MMI therapy until she done with her abroad trip in 5/2025 as she does not want to deal with relapse during her trip  - will decrease dose of MMI to 5 mg, continue to monitor TFTs  Plan:  Decrease MMI 10 -> 5 mg/day  Repeat TFTs in 3 mo.   Follow neuro-ophthalmologist recommendations.  Meanwhile, use sunglasses, artificial teardrops, avoid primary and secondary smoking    - methimazole (TAPAZOLE) 5 MG Tab; Take 1 Tablet by mouth every day.  Dispense: 90 Tablet; Refill: 4  - FREE THYROXINE; Future  - TSH; Future  - TSI; Future  - " THYROTROPIN RECEP AB; Future    # LANDY, started on CPAP  Prior notes:  8/21/24  - new diagnosis  - could be contributory to the fatigue  - continue management as per sleep medicine    # Irregular periods  - obtain prolactin level  12/18/24  - new problem  - can not be explained by thyroid pathology as she is currently euthyroid  - could be related to intensive physical activity  Plan:  Consider decrease intensity of physical activity  Evaluation by ObGYN  Obtain prolactin level.     - PROLACTIN; Future    RTC: 3 mo  Total time (face-to-face and non-face-to face time):  30 min  Plan reviewed with the patient and agreed with plan.  All questions answered to patient's satisfaction.  Thank you kindly for allowing me to participate in the care plan for this patient.    Kamilah Danielson MD    CC:   Nanci Lucero M.D.

## 2025-03-19 ENCOUNTER — OFFICE VISIT (OUTPATIENT)
Dept: ENDOCRINOLOGY | Facility: MEDICAL CENTER | Age: 35
End: 2025-03-19
Attending: STUDENT IN AN ORGANIZED HEALTH CARE EDUCATION/TRAINING PROGRAM
Payer: COMMERCIAL

## 2025-03-19 VITALS
BODY MASS INDEX: 30.68 KG/M2 | OXYGEN SATURATION: 98 % | SYSTOLIC BLOOD PRESSURE: 118 MMHG | HEART RATE: 97 BPM | DIASTOLIC BLOOD PRESSURE: 74 MMHG | WEIGHT: 162.5 LBS | HEIGHT: 61 IN

## 2025-03-19 DIAGNOSIS — N92.6 IRREGULAR PERIODS: ICD-10-CM

## 2025-03-19 DIAGNOSIS — E05.00 GRAVES DISEASE: ICD-10-CM

## 2025-03-19 PROCEDURE — 3078F DIAST BP <80 MM HG: CPT | Performed by: STUDENT IN AN ORGANIZED HEALTH CARE EDUCATION/TRAINING PROGRAM

## 2025-03-19 PROCEDURE — 99214 OFFICE O/P EST MOD 30 MIN: CPT | Performed by: STUDENT IN AN ORGANIZED HEALTH CARE EDUCATION/TRAINING PROGRAM

## 2025-03-19 PROCEDURE — 3074F SYST BP LT 130 MM HG: CPT | Performed by: STUDENT IN AN ORGANIZED HEALTH CARE EDUCATION/TRAINING PROGRAM

## 2025-03-19 PROCEDURE — 99211 OFF/OP EST MAY X REQ PHY/QHP: CPT | Performed by: STUDENT IN AN ORGANIZED HEALTH CARE EDUCATION/TRAINING PROGRAM

## 2025-03-19 RX ORDER — METHIMAZOLE 5 MG/1
5 TABLET ORAL DAILY
Qty: 90 TABLET | Refills: 4 | Status: SHIPPED | OUTPATIENT
Start: 2025-03-19 | End: 2025-03-23 | Stop reason: SDUPTHER

## 2025-03-19 ASSESSMENT — FIBROSIS 4 INDEX: FIB4 SCORE: 0.5

## 2025-03-23 DIAGNOSIS — E05.00 GRAVES DISEASE: ICD-10-CM

## 2025-03-24 RX ORDER — METHIMAZOLE 5 MG/1
5 TABLET ORAL DAILY
Qty: 90 TABLET | Refills: 4 | Status: SHIPPED | OUTPATIENT
Start: 2025-03-24

## 2025-03-26 ENCOUNTER — TELEMEDICINE (OUTPATIENT)
Dept: SLEEP MEDICINE | Facility: MEDICAL CENTER | Age: 35
End: 2025-03-26
Attending: NURSE PRACTITIONER
Payer: COMMERCIAL

## 2025-03-26 VITALS — HEIGHT: 61 IN | WEIGHT: 162 LBS | BODY MASS INDEX: 30.58 KG/M2

## 2025-03-26 DIAGNOSIS — G47.33 OSA ON CPAP: ICD-10-CM

## 2025-03-26 ASSESSMENT — FIBROSIS 4 INDEX: FIB4 SCORE: 0.5

## 2025-03-26 ASSESSMENT — PATIENT HEALTH QUESTIONNAIRE - PHQ9: CLINICAL INTERPRETATION OF PHQ2 SCORE: 0

## 2025-03-26 NOTE — PROGRESS NOTES
Virtual Visit: Established Patient   This visit was conducted via Teams using secure and encrypted videoconferencing technology.   The patient was in their home in the [facility].    The patient's identity was confirmed and verbal consent was obtained for this virtual visit.     Subjective:   CC:   Chief Complaint   Patient presents with    Follow-Up     SLEEP - ESTABLISHED PT CHART PREP COMPLETED ON 03/18/2025  Setup date: 07/19/2024  Last Office Visit 12/26/2024 with Eriberto Haider APRN     1st Compliance: No     DME: isleep    PAP/O2/OAT: auto CPAP to 8.6 to 12 cm/H2O.    Wireless Data? YES nick Gilbert is a 34 y.o. female presenting for evaluation and management of:    LANDY follow-up.  Last seen 12/26/2024.Previous home sleep study done on 5/31/2024 showed evidence of mild to moderate LANDY with an AHI of 14 with an RDI of 19.8. O2 karen was 86% with 0.3 minutes less than or equal to 88% SpO2.  Previously noncompliant, presents for compliance recheck.  Previously having mask problems.  Current mask is AirFit N20 and does like to feel the mask and the fit.  Denies any difficulty with pressures.  Denies aerophagia or any other problems.  Does have occasional dry mouth.  Is now better able to tolerate CPAP and does notice improvement in sleep quality.    30-day compliance shows 77% use with an average time of 6 hours and 47 minutes and a residual AHI Of 0.6 with no evidence of mask leak.    ROS   As per HPI    Current medicines (including changes today)  Current Outpatient Medications   Medication Sig Dispense Refill    methimazole (TAPAZOLE) 5 MG Tab Take 1 Tablet by mouth every day. 90 Tablet 4    LOW-OGESTREL 0.3-30 MG-MCG Tab TAKE 1 TABLET BY MOUTH EVERY DAY 84 Tablet 4    cyclobenzaprine (FLEXERIL) 5 mg tablet TAKE 1 TABLET BY MOUTH 2 TIMES A DAY AS NEEDED FOR MUSCLE SPASMS OR MODERATE PAIN. 30 Tablet 0    Cyanocobalamin (VITAMIN B 12 PO)       propranolol (INDERAL) 10 MG Tab Take 1  "Tablet by mouth 3 times a day.      SUMAtriptan (IMITREX) 50 MG Tab Take 1 Tablet by mouth one time as needed for Migraine for up to 1 dose. 10 Tablet 3    Magnesium 200 MG Tab Take 200 mg by mouth every day.      diclofenac sodium (VOLTAREN) 1 % Gel Apply 2 g topically 4 times a day as needed (back pain). 50 g 0    ciclopirox (PENLAC) 8 % solution Apply evenly over entire nail plate nightly to all affected nails. 6 mL 3    Vitamin D, Cholecalciferol, (CHOLECALCIFEROL) 25 MCG (1000 UT) Tab Take 1,000 Units by mouth every day.      Potassium Citrate 15 MEQ (1620 MG) Tab CR       ibuprofen (MOTRIN) 600 MG Tab Take 1 Tablet by mouth every 6 hours as needed for Moderate Pain or Inflammation. With full glass of water and food. (Patient not taking: Reported on 3/26/2025) 30 Tablet 0     No current facility-administered medications for this visit.       Patient Active Problem List    Diagnosis Date Noted    Obstructive sleep apnea syndrome 11/05/2024    Chronic bilateral low back pain without sciatica 09/04/2024    Family history of breast cancer 09/04/2024    Hordeolum internum of left lower eyelid 04/30/2024    New onset headache 04/17/2024    Serologic autoimmunity (positive MEGHAN 1:320 homogenous) 02/07/2024    Arthralgia 02/07/2024    Thyroid orbitopathy 10/17/2023    Palpitations 08/30/2023    Graves disease 08/30/2023    Hyperthyroidism 08/30/2023    Diarrhea 08/23/2023    Anxiety 08/23/2023    Recurrent kidney stones 08/22/2023    H/O: suicide attempt 08/22/2023    Moderate episode of recurrent major depressive disorder (HCC) 08/23/2022    Onychomycosis 08/22/2017    Generalized anxiety disorder 06/27/2017    Acquired complex renal cyst 10/03/2016    Dysmenorrhea 02/02/2016        Objective:   Ht 1.549 m (5' 1\")   Wt 73.5 kg (162 lb)   BMI 30.61 kg/m²     Physical Exam:  Constitutional: Alert, no distress, well-groomed.  Skin: No rashes in visible areas.  Eye: Round. Conjunctiva clear, lids normal. No icterus. "   ENMT: Lips pink without lesions, good dentition, moist mucous membranes. Phonation normal.  Neck: No masses, no thyromegaly. Moves freely without pain.  Respiratory: Unlabored respiratory effort, no cough or audible wheeze  Psych: Alert and oriented x3, normal affect and mood.     Assessment and Plan:   The following treatment plan was discussed:     1. LANDY on CPAP  - DME Mask and Supplies  Using and benefiting from CPAP therapy.  Now compliant and benefiting from CPAP.  Compliance report shows adequate use and control of LANDY.  Order placed for mask and supplies and patient advised to follow-up in 1 year, but can be seen sooner if needed.    Follow-up: Return in about 1 year (around 3/26/2026) for LANDY.

## 2025-04-04 ENCOUNTER — PHYSICAL THERAPY (OUTPATIENT)
Dept: PHYSICAL THERAPY | Facility: REHABILITATION | Age: 35
End: 2025-04-04
Attending: STUDENT IN AN ORGANIZED HEALTH CARE EDUCATION/TRAINING PROGRAM
Payer: COMMERCIAL

## 2025-04-04 DIAGNOSIS — G89.29 CHRONIC BILATERAL LOW BACK PAIN WITHOUT SCIATICA: ICD-10-CM

## 2025-04-04 DIAGNOSIS — M54.50 CHRONIC BILATERAL LOW BACK PAIN WITHOUT SCIATICA: ICD-10-CM

## 2025-04-04 PROCEDURE — 97110 THERAPEUTIC EXERCISES: CPT | Performed by: PHYSICAL THERAPIST

## 2025-04-04 NOTE — OP THERAPY PROGRESS SUMMARY
Outpatient Physical Therapy  PROGRESS SUMMARY NOTE      [facility] Physical Therapy [address].  [address]  [place] [address]  Phone:  [phone]  Fax:  [phone]    Date of Visit: 04/04/2025    Patient: Deisy Gilbert  YOB: 1990  MRN: [id number]     Referring Provider: Nanci Lucero M.D.  [address] [facility]  [address]  [place] [address]   Referring Diagnosis Low back pain, unspecified [M54.50];Other chronic pain [G89.29]     Visit Diagnoses     ICD-10-CM   1. Chronic bilateral low back pain without sciatica  M54.50    G89.29       Rehab Potential: good    Progress Report Period: 2/7/2025-4/4/2025    Functional Assessment Used          Objective Findings and Assessment:   Patient progression towards goals: Deisy reports that she she was unable to come in for physical therapy over the past few weeks because she broke her finger by dropping a weight on it. It is now healed. In that time, overall she has been feeling fairly good in physical therapy. She did have one instance of sore back when she was doing leg press at the gym. She thinks this is because the machine is not made for shorter people her height.     Deisy feels like she is doing well but wants to make sure that she is fine going back to heavy lifting like squats and deadlift, which is what originally caused her symptoms. She feels that she would benefit from another couple of visits in physical therapy prior to discharge.     Objective findings and assessment details: ASSESSMENT  Deisy shows excellent overall improvement in physical therapy. She has gained notable strength and presents with good postural awareness and control when performing a variety of exercises. She has not been in PT for a few weeks because she broke her finger by dropping a weight on it at the gym. When this happened, we were just beginning to progress into heavier weight with deadlift and squats. To ensure safe independent performance of  this at the gym and prevent future injury, I recommend that Deisy come for another 2-4 visits over the next month prior to anticipated discharge.    OBJECTIVE     Strength:       Abdominals   Left: 4  Right: 4     Left Hip   Planes of Motion   Flexion: 4+  Extension: 4  Abduction: 4  Adduction: 4+  External rotation: 4  Internal rotation: 4+     Right Hip   Planes of Motion   Flexion: 4  Extension: 4-  Abduction: 4-  Adduction: 4  External rotation: 4  Internal rotation: 4+     Left Knee   Flexion: 5  Extension: 5     Right Knee   Flexion: 5  Extension: 5      Goals:   Short Term Goals:   1. Patient will demonstrate independent regular performance of tailored home exercise program in order to facilitate recovery and promote self treatment and patient ownership of recovery  -- MET --  2. Patient will demo normal flexibility to bilateral hip flexor musculature to reduce pull into anterior pelvic tilt and reduce lumbar hyperlordosis during squat lift and other full body transitional movements  -- MET --  3. Patient will demo core strength >/= 4/5 (3+/5 at eval) in order to indicate strength needed to control and mitigate symptoms with performance of domestic duties and community participation.  -- MET --    Short term goal time span:  2-4 weeks      Long Term Goals:    1. Patient will demo bilateral hip abduction strength >/= 4-/5 (3/5 at eval) to improve sagittal plane stability during squat as well as single leg phase of gait to reduce QL compensation  -- IN PROGRESS --  2. Patient will demo bilateral hip extension strength >/= 4/5 (3+/5 at eval) to improve transfer and lift mechanics and reduce lumbar strain  -- IN PROGRESS, 4-/5 --  3. Patient will self report </= 12% disability via Oswestry functional assessment questionnaire (26% at eval) -- MET, 10% --    Long term goal time span:  2-4 months    Plan:   Planned therapy interventions:  Neuromuscular Re-education (CPT 81097) and Therapeutic Exercise (CPT  85118)  Frequency:  2x week  Duration in weeks:  4  Duration in visits:  4      Referring provider co-signature:  I have reviewed this plan of care and my co-signature certifies the need for services.     Certification Period: 04/04/2025 to 05/02/25    Physician Signature: ________________________________ Date: ______________

## 2025-04-04 NOTE — OP THERAPY DAILY TREATMENT
"  Outpatient Physical Therapy  DAILY TREATMENT     [address].  [address]  [place] [address]  Phone:  [phone]  Fax:  [phone]    Date: 04/04/2025    Patient: Deisy Gilbert  YOB: 1990  MRN: [id number]     Time Calculation    Start time: 1400  Stop time: 1445 Time Calculation (min): 45 minutes     Chief Complaint: Back Problem    Visit #: 12    SUBJECTIVE:  Please see GA    OBJECTIVE:  Current objective measures: Please see GA          Therapeutic Exercises (CPT 66867):     1. NuStep w/u, 6' level 5    2. BB rocking FWD/BWD, emphasis on core and postural stability    3. BB rocking SS, emphasis on core and postural stability    4. lateral step down 8\" step, 2x to fatigue ea, emphasis on core and postural stability    5. Shuttle squat DL, 7 cords 2x to fatigue    6. Shuttle squat SL, 5 cords 2x to fatigue ea    7. Isometric lat pulldown c TrA activation, to fatigue +1\" holds      Therapeutic Exercise Summary: HEP: DKTC, LTR, SL open book, TrA activation, supine march, bridge    UPDATED HEP (12/17/2024): SL open book, SL hip abd --> band, stagger stance sit to stand, figure 4 bridge, hip flexor stretch, MB squat lift    Access Code: [email or web address]  URL: [email or web address]  Date: 04/04/2025  Prepared by: Deangelo Holley    Exercises  - Forward T  - 1 x daily - 3-5 x weekly - 2-3 sets  - Kettlebell Deadlift  - 1 x daily - 3-5 x weekly - 2-3 sets  - Goblet Squat with Kettlebell  - 1 x daily - 3-5 x weekly - 2-3 sets  - Plank with Hip Extension  - 1 x daily - 3-5 x weekly - 2-3 sets  - Modified Side Plank with Hip Abduction  - 1 x daily - 3-5 x weekly - 2-3 sets      Time-based treatments/modalities:    Physical Therapy Timed Treatment Charges  Therapeutic exercise minutes (CPT 84391): 45 minutes    ASSESSMENT:   Response to treatment: Please see GA    PLAN/RECOMMENDATIONS:   Plan for treatment: therapy treatment to continue next visit.  Planned " interventions for next visit: continue with current treatment.

## 2025-04-11 ENCOUNTER — APPOINTMENT (OUTPATIENT)
Dept: PHYSICAL THERAPY | Facility: REHABILITATION | Age: 35
End: 2025-04-11
Attending: STUDENT IN AN ORGANIZED HEALTH CARE EDUCATION/TRAINING PROGRAM
Payer: COMMERCIAL

## 2025-04-18 ENCOUNTER — PHYSICAL THERAPY (OUTPATIENT)
Dept: PHYSICAL THERAPY | Facility: REHABILITATION | Age: 35
End: 2025-04-18
Attending: STUDENT IN AN ORGANIZED HEALTH CARE EDUCATION/TRAINING PROGRAM
Payer: COMMERCIAL

## 2025-04-18 DIAGNOSIS — M54.50 CHRONIC BILATERAL LOW BACK PAIN WITHOUT SCIATICA: ICD-10-CM

## 2025-04-18 DIAGNOSIS — G89.29 CHRONIC BILATERAL LOW BACK PAIN WITHOUT SCIATICA: ICD-10-CM

## 2025-04-18 PROCEDURE — 97110 THERAPEUTIC EXERCISES: CPT | Performed by: PHYSICAL THERAPIST

## 2025-04-18 NOTE — OP THERAPY DAILY TREATMENT
Outpatient Physical Therapy  DAILY TREATMENT     [facility] Physical [address].  [address]  [place] [address]  Phone:  [phone]  Fax:  [phone]    Date: 04/18/2025    Patient: Deisy Gilbert  YOB: 1990  MRN: [id number]     Time Calculation    Start time: 0901  Stop time: 0945 Time Calculation (min): 44 minutes     Chief Complaint: Back Problem    Visit #: 13    SUBJECTIVE:  Deisy reports that she is continuing to feel good improvement over time. She has returned to the gym and has been able to do everything without much pain. She occasionally gets a sensation of pulling in her lower back but questions if that's normal. She also reports that she feels like her wrists and her L pinky finger have been bothering her a little bit more over the last few weeks since she has been back in the gym.    OBJECTIVE:  Current objective measures:   Negative findings for all special testing for wrist OA, TFCC, fracture or dislocation. Normal radiocarpal JM, Grossly 4/5 strength in wrist and . Mildly tender to palpation and x-fraction to distal wrist tendons, especially extension and thumb abduction/extension          Therapeutic Exercises (CPT 26646):     1. NuStep w/u, 6' level 6    2. Deadlift, 65# x1, 95# x2 to fatigue    3. Squat lift, 15# 2x to fatigue    4. wrist ABCs, 2# 2x to fatigue    5. wrist curls, 2# 2x to fatigue    6. body blade, IR/ER, push/pull      Therapeutic Exercise Summary: Access Code: [email or web address]  URL: [email or web address]  Date: 04/04/2025  Prepared by: Deangelo Holley    Exercises  - Forward T  - 1 x daily - 3-5 x weekly - 2-3 sets  - Kettlebell Deadlift  - 1 x daily - 3-5 x weekly - 2-3 sets  - Goblet Squat with Kettlebell  - 1 x daily - 3-5 x weekly - 2-3 sets  - Plank with Hip Extension  - 1 x daily - 3-5 x weekly - 2-3 sets  - Modified Side Plank with Hip Abduction  - 1 x daily - 3-5 x weekly - 2-3 sets        Time-based  treatments/modalities:    Physical Therapy Timed Treatment Charges  Therapeutic exercise minutes (CPT 69531): 44 minutes    ASSESSMENT:   Response to treatment: Deisy presents with mild abductor pollicis, extensor pollicis, and extensor digitorum tendinopathy. I discussed this with her and showed her some exercises for it along with providing theraputty. Despite this, I assess that Deisy is approaching readiness for discharge, potentially at next visit. We will assess at that time.    PLAN/RECOMMENDATIONS:   Plan for treatment: therapy treatment to continue next visit.  Planned interventions for next visit: continue with current treatment.

## 2025-04-25 ENCOUNTER — PHYSICAL THERAPY (OUTPATIENT)
Dept: PHYSICAL THERAPY | Facility: REHABILITATION | Age: 35
End: 2025-04-25
Attending: STUDENT IN AN ORGANIZED HEALTH CARE EDUCATION/TRAINING PROGRAM
Payer: COMMERCIAL

## 2025-04-25 DIAGNOSIS — G89.29 CHRONIC BILATERAL LOW BACK PAIN WITHOUT SCIATICA: ICD-10-CM

## 2025-04-25 DIAGNOSIS — M54.50 CHRONIC BILATERAL LOW BACK PAIN WITHOUT SCIATICA: ICD-10-CM

## 2025-04-25 PROCEDURE — 97110 THERAPEUTIC EXERCISES: CPT | Performed by: PHYSICAL THERAPIST

## 2025-04-25 NOTE — OP THERAPY DAILY TREATMENT
Outpatient Physical Therapy  DAILY TREATMENT     [facility] Physical [address].  [address]  [place] [address]  Phone:  [phone]  Fax:  [phone]    Date: 04/25/2025    Patient: Deisy Gilbert  YOB: 1990  MRN: [id number]     Time Calculation    Start time: 0945  Stop time: 1030 Time Calculation (min): 45 minutes     Chief Complaint: Back Problem    Visit #: 14    SUBJECTIVE:  Deisy reports continued improvement in physical therapy. She does have some occasional discomfort in her fingers, especially the pink that she broke, but it is improving over time.    OBJECTIVE:  Current objective measures: Mild discomfort to R pinky with compression and distraction to IP joints          Therapeutic Exercises (CPT 50091):     1. NuStep w/u, 6' level 6    2. Deadlift, 65# x1, 95# x2 to fatigue    3. Squat lift, 15# 2x to fatigue    4. wrist ABCs, 2# 2x to fatigue    5. wrist curls, 2# 2x to fatigue, NT    7. PNF lift, 30# 2x to fatigue ea    8. PNF chop, 30# 2x to fatigue ea    9. triceps pulldown + TrA, 40# 2x to fatigue      Therapeutic Exercise Summary: Access Code: [email or web address]  URL: [email or web address]  Date: 04/04/2025  Prepared by: Deangelo Holley    Exercises  - Forward T  - 1 x daily - 3-5 x weekly - 2-3 sets  - Kettlebell Deadlift  - 1 x daily - 3-5 x weekly - 2-3 sets  - Goblet Squat with Kettlebell  - 1 x daily - 3-5 x weekly - 2-3 sets  - Plank with Hip Extension  - 1 x daily - 3-5 x weekly - 2-3 sets  - Modified Side Plank with Hip Abduction  - 1 x daily - 3-5 x weekly - 2-3 sets        Time-based treatments/modalities:    Physical Therapy Timed Treatment Charges  Therapeutic exercise minutes (CPT 79299): 45 minutes    ASSESSMENT:   Response to treatment: Patient still presents with mild to moderate pain [facility] for how to self treat and improve this over time. We also discussed natural progression of  strength as her limiting factor  and how to factor this into progression of functional strengthening exercises like dead lifts and squat lifts.    PLAN/RECOMMENDATIONS:   Plan for treatment: therapy treatment to continue next visit.  Planned interventions for next visit: continue with current treatment.

## 2025-06-06 ENCOUNTER — PHYSICAL THERAPY (OUTPATIENT)
Dept: PHYSICAL THERAPY | Facility: REHABILITATION | Age: 35
End: 2025-06-06
Attending: STUDENT IN AN ORGANIZED HEALTH CARE EDUCATION/TRAINING PROGRAM
Payer: COMMERCIAL

## 2025-06-06 DIAGNOSIS — M54.50 CHRONIC BILATERAL LOW BACK PAIN WITHOUT SCIATICA: Primary | ICD-10-CM

## 2025-06-06 DIAGNOSIS — G89.29 CHRONIC BILATERAL LOW BACK PAIN WITHOUT SCIATICA: Primary | ICD-10-CM

## 2025-06-06 PROCEDURE — 97140 MANUAL THERAPY 1/> REGIONS: CPT | Performed by: PHYSICAL THERAPIST

## 2025-06-06 PROCEDURE — 97110 THERAPEUTIC EXERCISES: CPT | Performed by: PHYSICAL THERAPIST
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
## 2025-06-06 NOTE — OP THERAPY DAILY TREATMENT
Outpatient Physical Therapy  DAILY TREATMENT     [address]  [address]  [place] [address]  Phone:  [phone]  Fax:  [phone]    Date: 06/06/2025    Patient: Deisy Gilbert  YOB: 1990  MRN: [id number]     Time Calculation    Start time: 0900  Stop time: 0944 Time Calculation (min): 44 minutes         Chief Complaint: Back Problem    Visit #: 15    SUBJECTIVE:  Please see DC    OBJECTIVE:  Current objective measures: Please see DC          Therapeutic Exercises (CPT 76160):     1. SL quad stretch    2. knee extension strengthening    3. hamstring curl strengthening    4. SL heel raise    5. Forward T    6. KB squat    7. KB DL    8. plank with extension    9. side plank with hip abduction      Therapeutic Exercise Summary: Access Code: [email or web address]  URL: [email or web address]  Date: 06/06/2025  Prepared by: Deangelo Holley    Exercises  - Sidelying Quadriceps Stretch  - 1-2 min hold  - Knee Extension with Weight Machine   - Full Leg Press   - Hamstring Curl with Weight Machine   - Standing Single Leg Heel Raise   - Forward T   - Kettlebell Deadlift   - Goblet Squat with Kettlebell   - Plank with Hip Extension   - Side Plank on Elbow with Hip Abduction     Therapeutic Treatments and Modalities:     1. Manual Therapy (CPT 13689), patellofemoral JMs gr 2-3, tibiofemoral JMs gr 2-3, ligament and mensicus testing    Time-based treatments/modalities:    Physical Therapy Timed Treatment Charges  Manual therapy minutes (CPT 54613): 10 minutes  Therapeutic exercise minutes (CPT 32439): 34 minutes    ASSESSMENT:   Response to treatment: Please see DC    PLAN/RECOMMENDATIONS:   Plan for treatment: DC.  Planned interventions for next visit: EDUARDO.

## 2025-06-13 ENCOUNTER — APPOINTMENT (OUTPATIENT)
Dept: PHYSICAL THERAPY | Facility: REHABILITATION | Age: 35
End: 2025-06-13
Attending: STUDENT IN AN ORGANIZED HEALTH CARE EDUCATION/TRAINING PROGRAM
Payer: COMMERCIAL

## 2025-06-20 ENCOUNTER — APPOINTMENT (OUTPATIENT)
Dept: PHYSICAL THERAPY | Facility: REHABILITATION | Age: 35
End: 2025-06-20
Attending: STUDENT IN AN ORGANIZED HEALTH CARE EDUCATION/TRAINING PROGRAM
Payer: COMMERCIAL

## 2025-06-20 ENCOUNTER — HOSPITAL ENCOUNTER (OUTPATIENT)
Dept: LAB | Facility: MEDICAL CENTER | Age: 35
End: 2025-06-20
Attending: STUDENT IN AN ORGANIZED HEALTH CARE EDUCATION/TRAINING PROGRAM
Payer: COMMERCIAL

## 2025-06-20 DIAGNOSIS — E05.00 GRAVES DISEASE: ICD-10-CM

## 2025-06-20 DIAGNOSIS — N92.6 IRREGULAR PERIODS: ICD-10-CM

## 2025-06-20 LAB
PROLACTIN SERPL-MCNC: 9.58 NG/ML (ref 2.8–26)
T4 FREE SERPL-MCNC: 1.21 NG/DL (ref 0.93–1.7)
TSH SERPL-ACNC: 0.81 UIU/ML (ref 0.38–5.33)

## 2025-06-20 PROCEDURE — 36415 COLL VENOUS BLD VENIPUNCTURE: CPT

## 2025-06-20 PROCEDURE — 84439 ASSAY OF FREE THYROXINE: CPT

## 2025-06-20 PROCEDURE — 83520 IMMUNOASSAY QUANT NOS NONAB: CPT

## 2025-06-20 PROCEDURE — 84146 ASSAY OF PROLACTIN: CPT

## 2025-06-20 PROCEDURE — 84445 ASSAY OF TSI GLOBULIN: CPT

## 2025-06-20 PROCEDURE — 84443 ASSAY THYROID STIM HORMONE: CPT

## 2025-06-22 LAB — TSI SER-ACNC: 0.24 IU/L

## 2025-06-23 LAB — TSH RECEP AB SER-ACNC: <1.1 IU/L

## 2025-06-26 NOTE — OP THERAPY DAILY TREATMENT
Outpatient Physical Therapy  DAILY TREATMENT     [facility] Physical [address].  [address]  [place] [address]  Phone:  [phone]  Fax:  [phone]    Date: 02/12/2025    Patient: Deisy Gilbert  YOB: 1990  MRN: [id number]     Time Calculation    Start time: 1615  Stop time: 1658 Time Calculation (min): 43 minutes     Chief Complaint: Back Problem    Visit #: 10    SUBJECTIVE:  Deisy reports that works has been a little bit more stressful than usual and she is on her period, but despite that, she hasn't been feeling back pain and is really happy about that.    OBJECTIVE:  Current objective measures: NT          Therapeutic Exercises (CPT 49569):     1. NuStep w/u, level 5, 6'    3. QP hip airplane plus IR/ER, 2x to fatigue ea    5. squat lift, 12# MB 2x to fatigue, NT    7. prone plank + alt hip extension, 2x to fatigue, mild L LB discomfort after    8. bird dog, x20 ea, NT    9. side lying plank, 2x to fatigue ea    10. dead lift, 45# 1x to fatigue, 55# 2x to fatigue, NT    13. PB bridge, NT    14. lumbar stretch, 2' ea    16. D2 flexion, green TB x1, pink TB x1 to fatigue    18. stagger stance sit down, NT    19. figure 4 bridge      Therapeutic Exercise Summary: HEP: DKTC, LTR, SL open book, TrA activation, supine march, bridge    UPDATED HEP (12/17/2024): SL open book, SL hip abd --> band, stagger stance sit to stand, figure 4 bridge, hip flexor stretch, MB squat lift    Added to HEP (2/7/2024): lumbar stretch    Therapeutic Treatments and Modalities:     1. Manual Therapy (CPT 33742), Tx paraspinal MFR, lat MFR, T4-T8 gr 2 PA JM, NT    Time-based treatments/modalities:    Physical Therapy Timed Treatment Charges  Therapeutic exercise minutes (CPT 75783): 43 minutes    ASSESSMENT:   Response to treatment: Excellent continued improvement from Deisy. She was able to perform deadlifts today to 55# without exacerbation of pain. We will continue pushing her in  functional strengthening with goal of return to sport (gym) and potential DC at next DC.    PLAN/RECOMMENDATIONS:   Plan for treatment: therapy treatment to continue next visit.  Planned interventions for next visit: continue with current treatment.          Home

## 2025-06-27 ENCOUNTER — APPOINTMENT (OUTPATIENT)
Dept: PHYSICAL THERAPY | Facility: REHABILITATION | Age: 35
End: 2025-06-27
Attending: STUDENT IN AN ORGANIZED HEALTH CARE EDUCATION/TRAINING PROGRAM
Payer: COMMERCIAL

## 2025-06-29 NOTE — PROGRESS NOTES
"Follow up Endocrinology Visit  Initial consult/last visit on: 9/7/23  Last visit on: 3/19/25  Referred by:  Nanci Lucero M.D.    Chief complaint:  Deisy Gilbert, is a very pleasant 34 y.o.female, who is here for follow up for Graves disease    Interval history:   - doing well off MMI (stopped on 5/24/25)  - reports new R breast pain x couple weeks  - reports some fatigue but associate it with her recent activities - hiking, campling  - reviewed recent labs  Prior notes:  - since last visit feeling much better: tremor stopped, muscle weakness resolved, able to squat without any problem, currently using cane intermittently with back pains, which she associates with kidney stones  - diarrhea improved, but not resolved, patient asked for referral to GI specialist from PCP  - reports new right eye periorbital edema, gritty sensation, light sensitivity, ptosis, intermittent blurry vision, scheduled with neuro-ophthalmologist on 10/17/2023  - plans for surgical kidney stones removal on 10/18/2023  - reviewed with the patient new labs and thyroid US  11/09/23  - overall doing much better, however still feels very tired,  despite sleeping well  - gained couple lb since last visit  - still has diarrhea, even it much improved since initiation Rx with MMI; plans for GI evaluation  - had surgery for kidney stone removal, unfortunately, kidney stone was no evaluated for composition  - still has R eye ptosis and tearing, myasthenia gravis was ruled out  - followed by neuro-ophthalmologist, for now recommended active surveillance  - recent labs were reviewed  2/09/24  - patient developed disturbing knee pains, noted elevated MEGHAN, was seen by rheumatologist, there is a concern of possible MMI-induced SLE  - patient continues to feel fatigue  - eye \"are getting better\", she still feels gritty  sensation, uses artifical tears, denies blurry/double vision   4/12/24  - patient felt better after increasing dose of MMI, but later on " "feeling more fatigued, reports \"brain fog\"  - continues to have some L knee pain, follows rheumatologist who does not believe patient has MMI induced SLE  - noted blurry vision  6/24/24  - overall doing well  - eye symptoms improved but still sometimes has intermittent swelling; joint pain improved  - she also reports fatigue, not able to do hikes she was doing prior Dx of Graves disease  - she recently had 2 ED visits - 1st with palpitations, SBP up to 220 mmHg, second with migraine and elevated BP, she admits having panic attacks 15 years ago but she does not believe it was it  - she was measuring her BP at home, and it was < 140/85 mmHg  - her PCP is on vacation, she was able to schedule follow up appointment only in 9/2024  - reviewed recent labs  8/21/24  - did a sleep study 2 mo ago => started on CPAP 1 mo ago -> causing worsening of eye dryness  - with diet and exercise was able to lose weight  - reports morning periorbital edema  - reviewed recent labs  12/18/24  - overall doing well  - started heavy lifting to lose weight, noted menstrual cycle irregularities  - still has some itchiness and watering of her L eye, otherwise eyes are not bothering her  - reviewed recent labs  3/19/25  - overall doing well  - lost a lot of weight with physical activities  - had couple episodes of palpitations, was evaluated by cardiology, no pathology was found  - reviewed recent labs    Hyperthyroidism HPI:  - was feeling tired for many months  - she lost 11 lb but associated with recent loss of her friend  - since beginning of 8/2023 patient noted nausea, diarrhea (having up to 4 BMS/day, watery stools), later on she noted palpitations  - she also noted worsening of her anxiety, reports  insomnia, sweating/hot flashes, heaviness on her chest, hand tremors, muscle weakness to the point that she needs to use cane to walk - persistent  - on labs from 8/23/23 - biochemical evidence of hyperthyroidism + elevated TrAbs  - was " started on MMI 30 mg/day + propranolol 10 mg TID - no improvement of symptoms so far  - nonsmoker  - Fhx - thyroid disease in her aunt, not sure about the diagnosis  ALEJANDRO symptoms:  Dry, gritty eye  sensation - in R eye  Light sensitivity - in R eye  Tearing - no  Red eye - no  Proptosis -  R eye  Pain or pressure behind the eye - pressure behind R eye  Retroorbital pain - no  Pain with eye movement  - no   Eyelid retraction -  no  Eyelid swelling - R eye  Discomfort or pain in or behind the eye with looking L/R or up/down - no  Double vision - no  Blurry vision - intermittently  Color vision loss - no  Vision loss - no    Medications:  Current Outpatient Medications:     methimazole (TAPAZOLE) 5 MG Tab, Take 1 Tablet by mouth every day., Disp: 90 Tablet, Rfl: 4    ibuprofen (MOTRIN) 600 MG Tab, Take 1 Tablet by mouth every 6 hours as needed for Moderate Pain or Inflammation. With full glass of water and food. (Patient not taking: Reported on 3/26/2025), Disp: 30 Tablet, Rfl: 0    LOW-OGESTREL 0.3-30 MG-MCG Tab, TAKE 1 TABLET BY MOUTH EVERY DAY, Disp: 84 Tablet, Rfl: 4    cyclobenzaprine (FLEXERIL) 5 mg tablet, TAKE 1 TABLET BY MOUTH 2 TIMES A DAY AS NEEDED FOR MUSCLE SPASMS OR MODERATE PAIN., Disp: 30 Tablet, Rfl: 0    Cyanocobalamin (VITAMIN B 12 PO), , Disp: , Rfl:     propranolol (INDERAL) 10 MG Tab, Take 1 Tablet by mouth 3 times a day., Disp: , Rfl:     SUMAtriptan (IMITREX) 50 MG Tab, Take 1 Tablet by mouth one time as needed for Migraine for up to 1 dose., Disp: 10 Tablet, Rfl: 3    Magnesium 200 MG Tab, Take 200 mg by mouth every day., Disp: , Rfl:     diclofenac sodium (VOLTAREN) 1 % Gel, Apply 2 g topically 4 times a day as needed (back pain)., Disp: 50 g, Rfl: 0    ciclopirox (PENLAC) 8 % solution, Apply evenly over entire nail plate nightly to all affected nails., Disp: 6 mL, Rfl: 3    Vitamin D, Cholecalciferol, (CHOLECALCIFEROL) 25 MCG (1000 UT) Tab, Take 1,000 Units by mouth every day., Disp: , Rfl:  "    Potassium Citrate 15 MEQ (1620 MG) Tab CR, , Disp: , Rfl:     Physical Examination:   Vital signs: /78   Pulse 78   Ht 1.549 m (5' 1\") Comment: pt stated  Wt 74.7 kg (164 lb 11.2 oz)   SpO2 97%   BMI 31.12 kg/m²   General: No distress, cooperative, well dressed and well nourished.   Resp: Normal effort.  CVS: Regular rate and rhythm.  Extremities: No edema bilateral extremities  Neuro: Alert and oriented.   Skin: No rash, No Ulcers  Psych: Normal mood and affect    Labs:   Reference Range  08/22/23  08/23/23  09/29/23  11/06/23  02/05/24  02/07/24  04/06/24  04/26/24 06/04/24  06/17/24  8/9/24 12/09/24 3/3/25 6/20/25    TSH 0.380 - 5.330  <0.005 (L) <0.005 (L) <0.005 (L) <0.005 (L) <0.005 (L)  3.180 3.250 3.760 3.090 4.180 2.790 2.070 0.812    fT4 0.93 - 1.70 ng/dL 5.20 (H) 5.87 (H) 0.98 1.56 1.22  0.90 (L) 0.94  1.11 1.11 1.07 1.16 1.21    T3 60.0 - 181.0 ng/dL   165.0    149.0     125.0 136.0     fT3 2.00 - 4.40 pg/mL  19.90 (H)                TSI <=0.54 IU/L             0.36  0.24    TrAbs <=1.75 IU/L  9.72 (H)          < 1.10  < 1.10    TPO- Abs 0.0 - 9.0 IU/mL      246.0 (H)            Tg- Abs  0.0 - 4.0 IU/mL      1.0            MMI mg  30 10 10 10 15 15 10 10 10 10 10 5 off      Irregular periods work up:   Reference Rang 06/20/25   Prolactin 2.80 - 26.00 ng/mL 9.58     Pheochromocytoma work up:   Reference Range  08/09/24   Metanephrine Plasma 0.00 - 0.49 nmol/L <0.10   Normetanephrine Plasma 0.00 - 0.89 nmol/L 0.27     Primary hyperaldosteronism work up:   Reference Range 08/09/24   Potassium 3.6 - 5.5 mmol/L 4.2   Aldos Serum ng/dL 18.2   Renin Activity ng/mL/hr 2.1     Adrenal insufficiency work up:   Reference Range 08/09/24    Cortisol 0.0 - 23.0 ug/dL 20.3     Imaging:  - reviewed  Thyroid US on 10/2/2023:  HISTORY/REASON FOR EXAM:  Hyperthyroidism/Graves disease, thyroid gland enlargement.  TECHNIQUE/EXAM DESCRIPTION:  Ultrasound of the soft tissues of the head and neck.  COMPARISON:  " None  FINDINGS:  The thyroid gland is heterogeneous.  Vascularity is increased.  The right lobe of the thyroid gland measures 2.63 cm x 4.56 cm x 2.41 cm. The contour and echogenicity are normal. No focal mass lesions are identified.  The left lobe of the thyroid gland measures 2.08 cm x 4.21 cm x 1.73 cm. The contour and echogenicity are normal. No focal mass lesions are identified.  The isthmus measures 0.54 cm.  IMPRESSION:  1.  Mildly enlarged heterogeneous thyroid gland with hyperemia. Findings are consistent with Graves' disease.    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 - periodic hypokalemic paralysis and thyrotoxic periodic  paralysis are unlikely given persistent nature of muscle weakness vs myopathic attacks, already on propranolol - treatment for thyrotoxic periodic paralysis  - appropriately started on MMI and nonselective beta-blocker  - we discussed cause and natural history of the Graves disease, treatment approaches  - I explained that it takes up to 3-4 weeks to reach euthyroidism  - clinical and biochemical improvement of hyperthyroidism with normalization of free T4, TSH is still suppressed but likely is lagging in response  - we will decrease MMI to maintenance dose of 10 mg a day  - patient continues to have diarrhea, unlikely related to hyperthyroidism, celiac disease work-up was negative, agree with PCP with GI referral  - new symptoms consistent with right thyroid eye disease, referred to  "neuro-ophthalmologist  2/09/23  - on maintenance dose of MMI, fT4 wnl, TSH is still suppressed - likely indicates that patient needs higher dose of MMI  - concerns of MMI- induced SLE, follows rheumatologist  - had extensive discussion of possible approaches, there is a high risk of having SLE even with transition to PTU - will discuss with rheumatologist if that is reasonable, if neither of thionamides could be used -> consider surgical therapy, as GIRON might exacerbate ALEJANDRO  - discussed consequences of thyroidectomy and principles of replacement therapy   - patient will continue evaluation by rheumatology, will update me about plans, if transition to PTU is reasonable, if continues to take MMI -> consider increase the dose to 15 mg/day.   - ALEJANDRO improved  4/12/24  - improvement of symptoms after increasing dose of MMI  during the last visit, but then feels more fatigued, \"brain fogged\"  - on labs iatrogenic hypothyroidism  - will taper down MMI 15 -> 10 mg  - repeat fT4 in 2 weeks, TSH in 2 mo  - patient noted worsening of ALEJANDRO symptoms, eye exam unremarkable but patient reports blurry vision with down gaze, plans to see neuroophthalmologist  6/24/24  - euthyroid on MMI 10 mg/day  - recent palpitations, BP elevation could not be explained by thyroid issues  8/21/24  - already 1 year on MMI, euthyroid on MMI 10 mg/day  - continue current management, repeat TFTs and check TSI/TrAbs in 4 mo  12/18/24  - euthyroid on MMI 10 mg  - likely in remission given normalized levels of TSI/TrAb  - on MMI - 1 year + 4 mo  - patient is hesitant to stop MMI therapy until she done with her abroad trip in 5/2025 as she does not want to deal with relapse during her trip  - will decrease dose of MMI to 5 mg, continue to monitor TFTs  3/19/25  - euthyroid on MMI 5 mg  - likely in remission given normalized levels of TSI/TrAb  - on MMI - 1 year + 7 mo  - patient is hesitant to stop MMI therapy until she done with her abroad trip in 5/2025 " as she does not want to deal with relapse during her trip  - continue MMI to 5 mg, repeat TFTs/TSI/TrAbs in 3 mo, if euthyroid and negative Graves disease markers -> stop therapy  Plan:  Continue to hold MMI  Repeat TFTs in 6 weeks and 3 mo.   Follow neuro-ophthalmologist recommendations.  Meanwhile, use sunglasses, artificial teardrops, avoid primary and secondary smoking  - FREE THYROXINE; Future  - TSH; Future  - TRIIDOTHYRONINE; Future  - FREE THYROXINE; Future  - TSH; Future  - TRIIDOTHYRONINE; Future    # LANDY, started on CPAP  Prior notes:  8/21/24  - new diagnosis  - could be contributory to the fatigue  - continue management as per sleep medicine    # Irregular periods  - prolactin level is wnl  12/18/24  - new problem  - can not be explained by thyroid pathology as she is currently euthyroid  - could be related to intensive physical activity  3/19/25  - obtain prolactin level  Plan:  Consider decrease intensity of physical activity  Evaluation by ObGYN    # New R breast pain  - discuss with PCP or ObGYn  - no hyperprolactinemia    RTC: 3 mo  Plan reviewed with the patient and agreed with plan.  All questions answered to patient's satisfaction.  Thank you kindly for allowing me to participate in the care plan for this patient.  Kamilah Danielson MD    CC:   Nanci Lucero M.D.

## 2025-07-01 ENCOUNTER — OFFICE VISIT (OUTPATIENT)
Dept: ENDOCRINOLOGY | Facility: MEDICAL CENTER | Age: 35
End: 2025-07-01
Attending: STUDENT IN AN ORGANIZED HEALTH CARE EDUCATION/TRAINING PROGRAM
Payer: COMMERCIAL

## 2025-07-01 VITALS
OXYGEN SATURATION: 97 % | HEIGHT: 61 IN | DIASTOLIC BLOOD PRESSURE: 78 MMHG | WEIGHT: 164.7 LBS | HEART RATE: 78 BPM | BODY MASS INDEX: 31.1 KG/M2 | SYSTOLIC BLOOD PRESSURE: 120 MMHG

## 2025-07-01 DIAGNOSIS — E05.00 GRAVES DISEASE: Primary | ICD-10-CM

## 2025-07-01 PROCEDURE — 99214 OFFICE O/P EST MOD 30 MIN: CPT | Performed by: STUDENT IN AN ORGANIZED HEALTH CARE EDUCATION/TRAINING PROGRAM

## 2025-07-01 PROCEDURE — 99213 OFFICE O/P EST LOW 20 MIN: CPT | Performed by: STUDENT IN AN ORGANIZED HEALTH CARE EDUCATION/TRAINING PROGRAM

## 2025-07-01 PROCEDURE — 3078F DIAST BP <80 MM HG: CPT | Performed by: STUDENT IN AN ORGANIZED HEALTH CARE EDUCATION/TRAINING PROGRAM

## 2025-07-01 PROCEDURE — 3074F SYST BP LT 130 MM HG: CPT | Performed by: STUDENT IN AN ORGANIZED HEALTH CARE EDUCATION/TRAINING PROGRAM

## 2025-07-01 ASSESSMENT — FIBROSIS 4 INDEX: FIB4 SCORE: 0.5

## 2025-07-16 ENCOUNTER — OFFICE VISIT (OUTPATIENT)
Dept: URGENT CARE | Facility: CLINIC | Age: 35
End: 2025-07-16
Payer: COMMERCIAL

## 2025-07-16 ENCOUNTER — APPOINTMENT (OUTPATIENT)
Dept: RADIOLOGY | Facility: IMAGING CENTER | Age: 35
End: 2025-07-16
Attending: STUDENT IN AN ORGANIZED HEALTH CARE EDUCATION/TRAINING PROGRAM
Payer: COMMERCIAL

## 2025-07-16 VITALS
HEIGHT: 61 IN | OXYGEN SATURATION: 99 % | TEMPERATURE: 97.6 F | BODY MASS INDEX: 30.02 KG/M2 | RESPIRATION RATE: 14 BRPM | SYSTOLIC BLOOD PRESSURE: 116 MMHG | WEIGHT: 159 LBS | DIASTOLIC BLOOD PRESSURE: 68 MMHG | HEART RATE: 71 BPM

## 2025-07-16 DIAGNOSIS — S99.921A INJURY OF TOE ON RIGHT FOOT, INITIAL ENCOUNTER: ICD-10-CM

## 2025-07-16 DIAGNOSIS — S92.531A CLOSED DISPLACED FRACTURE OF DISTAL PHALANX OF LESSER TOE OF RIGHT FOOT, INITIAL ENCOUNTER: Primary | ICD-10-CM

## 2025-07-16 PROCEDURE — 3074F SYST BP LT 130 MM HG: CPT | Performed by: STUDENT IN AN ORGANIZED HEALTH CARE EDUCATION/TRAINING PROGRAM

## 2025-07-16 PROCEDURE — 73660 X-RAY EXAM OF TOE(S): CPT | Mod: TC,RT | Performed by: STUDENT IN AN ORGANIZED HEALTH CARE EDUCATION/TRAINING PROGRAM

## 2025-07-16 PROCEDURE — 99213 OFFICE O/P EST LOW 20 MIN: CPT | Performed by: STUDENT IN AN ORGANIZED HEALTH CARE EDUCATION/TRAINING PROGRAM

## 2025-07-16 PROCEDURE — 3078F DIAST BP <80 MM HG: CPT | Performed by: STUDENT IN AN ORGANIZED HEALTH CARE EDUCATION/TRAINING PROGRAM

## 2025-07-16 ASSESSMENT — FIBROSIS 4 INDEX: FIB4 SCORE: 0.5

## 2025-07-16 NOTE — PROGRESS NOTES
"Subjective:   Deisy Gilbert is a 34 y.o. female who presents for Toe Injury (RIGHT FOOT STUBBED  BABY TOE )      HPI:  34-year-old female presents to the urgent care for an injury to her right fifth toe.  This occurred within the past couple of days.  Accidentally hit her toe against her bed frame.  Had pain at the time of the injury.  Was on her way to the gym today and put her shoe on and felt a good amount of pain and noticed bruising to the area.  Has not noticed any obvious deformity and is still able to bear weight.  No loss of sensation.    Medications:    ciclopirox  cyclobenzaprine  diclofenac sodium Gel  ibuprofen Tabs  Low-Ogestrel Tabs  Magnesium Tabs  methimazole Tabs  Potassium Citrate Tbcr  propranolol Tabs  SUMAtriptan Tabs  VITAMIN B 12 PO  Vitamin D (Cholecalciferol) Tabs    Allergies: Lactose    Problem List: Deisy Gilbert does not have any pertinent problems on file.    Surgical History:  Past Surgical History:   Procedure Laterality Date    STENT PLACEMENT  10/2023    CYSTOSCOPY STENT PLACEMENT  2017       Past Social Hx: Deisy Gilbert  reports that she quit smoking about 12 years ago. Her smoking use included cigarettes. She started smoking about 17 years ago. She has never used smokeless tobacco. She reports that she does not currently use alcohol. She reports that she does not use drugs.     Past Family Hx:  Deisy Gilbert family history includes Breast Cancer in her mother, paternal aunt, and paternal aunt; Diabetes in her paternal uncle; Eczema in her father; Hypertension in her maternal grandmother and mother; No Known Problems in her brother.     Problem list, medications, and allergies reviewed by myself today in Epic.     Objective:     /68 (BP Location: Left arm, Patient Position: Sitting, BP Cuff Size: Adult)   Pulse 71   Temp 36.4 °C (97.6 °F) (Temporal)   Resp 14   Ht 1.549 m (5' 1\")   Wt 72.1 kg (159 lb)   SpO2 99%   BMI 30.04 kg/m²     Physical Exam  Feet: "      Comments: Right fifth toe: Mild amount of swelling and ecchymosis present.  TTP present.  No obvious deformity.  Good range of motion but pain with doing so.  Able to bear weight.  Cap refill less than 2 seconds.         RADIOLOGY RESULTS   DX-TOE(S) 2+ RIGHT  Result Date: 7/16/2025 7/16/2025 8:16 AM HISTORY/REASON FOR EXAM:  Pain/Deformity Following Trauma. TECHNIQUE/EXAM DESCRIPTION AND NUMBER OF VIEWS:  3 views of the  LEFT small toe. COMPARISON: None FINDINGS: MINERALIZATION: Mineralization is unremarkable for age. INJURY: Mildly displaced, intra-articular fracture of the dorsal aspect of the small toe distal phalangeal base. JOINTS: No erosive arthropathy is evident.     Mildly displaced, intra-articular fracture of the dorsal aspect of the small toe distal phalangeal base.           Assessment/Plan:     Diagnosis and associated orders:     1. Closed displaced fracture of distal phalanx of lesser toe of right foot, initial encounter  DX-TOE(S) 2+ RIGHT         Comments/MDM:     X-ray shows mildly displaced intra-articular fracture of the dorsal aspect of the small toe.  This is related to the distal phalangeal base.  No deformity on exam.  Discussed typical timeframe for healing.  Discussed david taping for a minimum of 2 to 4 weeks.  Discussed alternate exercises in the gym to reduce pressure on the toe and reduce chance of delayed healing.         Differential diagnosis, natural history, supportive care, and indications for immediate follow-up discussed.    Advised the patient to follow-up with the primary care physician for recheck, reevaluation, and consideration of further management.    Please note that this dictation was created using voice recognition software. I have made a reasonable attempt to correct obvious errors, but I expect that there are errors of grammar and possibly content that I did not discover before finalizing the note.    Electronically signed by Barry Ocampo PA-C.

## 2025-07-31 ENCOUNTER — HOSPITAL ENCOUNTER (EMERGENCY)
Facility: MEDICAL CENTER | Age: 35
End: 2025-07-31
Attending: STUDENT IN AN ORGANIZED HEALTH CARE EDUCATION/TRAINING PROGRAM
Payer: COMMERCIAL

## 2025-07-31 ENCOUNTER — APPOINTMENT (OUTPATIENT)
Dept: RADIOLOGY | Facility: MEDICAL CENTER | Age: 35
End: 2025-07-31
Attending: STUDENT IN AN ORGANIZED HEALTH CARE EDUCATION/TRAINING PROGRAM
Payer: COMMERCIAL

## 2025-07-31 VITALS
HEIGHT: 61 IN | OXYGEN SATURATION: 95 % | RESPIRATION RATE: 16 BRPM | BODY MASS INDEX: 31.18 KG/M2 | WEIGHT: 165.12 LBS | DIASTOLIC BLOOD PRESSURE: 80 MMHG | TEMPERATURE: 97.6 F | HEART RATE: 68 BPM | SYSTOLIC BLOOD PRESSURE: 135 MMHG

## 2025-07-31 DIAGNOSIS — R00.2 PALPITATIONS: Primary | ICD-10-CM

## 2025-07-31 DIAGNOSIS — I10 ACCELERATED HYPERTENSION: ICD-10-CM

## 2025-07-31 DIAGNOSIS — R73.9 HYPERGLYCEMIA: ICD-10-CM

## 2025-07-31 LAB
ALBUMIN SERPL BCP-MCNC: 4 G/DL (ref 3.2–4.9)
ALBUMIN/GLOB SERPL: 1.3 G/DL
ALP SERPL-CCNC: 86 U/L (ref 30–99)
ALT SERPL-CCNC: 33 U/L (ref 2–50)
ANION GAP SERPL CALC-SCNC: 12 MMOL/L (ref 7–16)
AST SERPL-CCNC: 25 U/L (ref 12–45)
BASOPHILS # BLD AUTO: 0.7 % (ref 0–1.8)
BASOPHILS # BLD: 0.07 K/UL (ref 0–0.12)
BILIRUB SERPL-MCNC: 0.4 MG/DL (ref 0.1–1.5)
BUN SERPL-MCNC: 17 MG/DL (ref 8–22)
CALCIUM ALBUM COR SERPL-MCNC: 9.1 MG/DL (ref 8.5–10.5)
CALCIUM SERPL-MCNC: 9.1 MG/DL (ref 8.5–10.5)
CHLORIDE SERPL-SCNC: 105 MMOL/L (ref 96–112)
CK SERPL-CCNC: 73 U/L (ref 0–154)
CO2 SERPL-SCNC: 19 MMOL/L (ref 20–33)
CREAT SERPL-MCNC: 0.75 MG/DL (ref 0.5–1.4)
EKG IMPRESSION: NORMAL
EOSINOPHIL # BLD AUTO: 0.08 K/UL (ref 0–0.51)
EOSINOPHIL NFR BLD: 0.8 % (ref 0–6.9)
ERYTHROCYTE [DISTWIDTH] IN BLOOD BY AUTOMATED COUNT: 42.5 FL (ref 35.9–50)
GFR SERPLBLD CREATININE-BSD FMLA CKD-EPI: 107 ML/MIN/1.73 M 2
GLOBULIN SER CALC-MCNC: 3.1 G/DL (ref 1.9–3.5)
GLUCOSE SERPL-MCNC: 136 MG/DL (ref 65–99)
HCG SERPL QL: NEGATIVE
HCT VFR BLD AUTO: 45.1 % (ref 37–47)
HGB BLD-MCNC: 15.1 G/DL (ref 12–16)
IMM GRANULOCYTES # BLD AUTO: 0.03 K/UL (ref 0–0.11)
IMM GRANULOCYTES NFR BLD AUTO: 0.3 % (ref 0–0.9)
LYMPHOCYTES # BLD AUTO: 2.18 K/UL (ref 1–4.8)
LYMPHOCYTES NFR BLD: 23 % (ref 22–41)
MCH RBC QN AUTO: 29.7 PG (ref 27–33)
MCHC RBC AUTO-ENTMCNC: 33.5 G/DL (ref 32.2–35.5)
MCV RBC AUTO: 88.8 FL (ref 81.4–97.8)
MONOCYTES # BLD AUTO: 0.55 K/UL (ref 0–0.85)
MONOCYTES NFR BLD AUTO: 5.8 % (ref 0–13.4)
NEUTROPHILS # BLD AUTO: 6.55 K/UL (ref 1.82–7.42)
NEUTROPHILS NFR BLD: 69.4 % (ref 44–72)
NRBC # BLD AUTO: 0 K/UL
NRBC BLD-RTO: 0 /100 WBC (ref 0–0.2)
PLATELET # BLD AUTO: 289 K/UL (ref 164–446)
PMV BLD AUTO: 9.4 FL (ref 9–12.9)
POTASSIUM SERPL-SCNC: 4 MMOL/L (ref 3.6–5.5)
PROT SERPL-MCNC: 7.1 G/DL (ref 6–8.2)
RBC # BLD AUTO: 5.08 M/UL (ref 4.2–5.4)
SODIUM SERPL-SCNC: 136 MMOL/L (ref 135–145)
TSH SERPL DL<=0.005 MIU/L-ACNC: 0.91 UIU/ML (ref 0.38–5.33)
WBC # BLD AUTO: 9.5 K/UL (ref 4.8–10.8)

## 2025-07-31 PROCEDURE — 36415 COLL VENOUS BLD VENIPUNCTURE: CPT

## 2025-07-31 PROCEDURE — 80053 COMPREHEN METABOLIC PANEL: CPT

## 2025-07-31 PROCEDURE — 82550 ASSAY OF CK (CPK): CPT

## 2025-07-31 PROCEDURE — 93005 ELECTROCARDIOGRAM TRACING: CPT | Mod: TC | Performed by: STUDENT IN AN ORGANIZED HEALTH CARE EDUCATION/TRAINING PROGRAM

## 2025-07-31 PROCEDURE — 84703 CHORIONIC GONADOTROPIN ASSAY: CPT

## 2025-07-31 PROCEDURE — 84443 ASSAY THYROID STIM HORMONE: CPT

## 2025-07-31 PROCEDURE — 85025 COMPLETE CBC W/AUTO DIFF WBC: CPT

## 2025-07-31 PROCEDURE — 93005 ELECTROCARDIOGRAM TRACING: CPT | Mod: TC

## 2025-07-31 PROCEDURE — 71045 X-RAY EXAM CHEST 1 VIEW: CPT

## 2025-07-31 PROCEDURE — 99283 EMERGENCY DEPT VISIT LOW MDM: CPT

## 2025-07-31 ASSESSMENT — FIBROSIS 4 INDEX: FIB4 SCORE: 0.5

## 2025-08-16 ENCOUNTER — HOSPITAL ENCOUNTER (OUTPATIENT)
Dept: LAB | Facility: MEDICAL CENTER | Age: 35
End: 2025-08-16
Attending: STUDENT IN AN ORGANIZED HEALTH CARE EDUCATION/TRAINING PROGRAM
Payer: COMMERCIAL

## 2025-08-16 DIAGNOSIS — E05.00 GRAVES DISEASE: ICD-10-CM

## 2025-08-16 LAB
T3 SERPL-MCNC: 144 NG/DL (ref 60–181)
T4 FREE SERPL-MCNC: 1.12 NG/DL (ref 0.93–1.7)
TSH SERPL-ACNC: 1.27 UIU/ML (ref 0.38–5.33)

## 2025-08-16 PROCEDURE — 84480 ASSAY TRIIODOTHYRONINE (T3): CPT

## 2025-08-16 PROCEDURE — 84439 ASSAY OF FREE THYROXINE: CPT

## 2025-08-16 PROCEDURE — 36415 COLL VENOUS BLD VENIPUNCTURE: CPT

## 2025-08-16 PROCEDURE — 84443 ASSAY THYROID STIM HORMONE: CPT
